# Patient Record
Sex: FEMALE | Race: WHITE | NOT HISPANIC OR LATINO | Employment: FULL TIME | URBAN - METROPOLITAN AREA
[De-identification: names, ages, dates, MRNs, and addresses within clinical notes are randomized per-mention and may not be internally consistent; named-entity substitution may affect disease eponyms.]

---

## 2017-02-15 ENCOUNTER — TELEPHONE (OUTPATIENT)
Dept: FAMILY MEDICINE | Facility: CLINIC | Age: 37
End: 2017-02-15

## 2017-02-15 NOTE — TELEPHONE ENCOUNTER
Spoke with pt, she has had increasing sinus/ear pressure/pain over the last week. Her nasal drainage is green to brown, has had lots of dripping down the throat, dry non productive cough, denies temp. Denies sore throat    Home care instruction given, try saline nasal drops, increase fluids. Use humidity    appt was made for pt to be seen    Pt had no other questions/concerns    Maia Suarez RN

## 2017-02-15 NOTE — TELEPHONE ENCOUNTER
Reason for call:  Patient reporting a symptom    Symptom or request: Lindsay called to say that the last week, she has had sinus pressure, with ear pain and headaches. She is wondering when she should be coming in for this or is it a viral.    Duration (how long have symptoms been present): for the last week    Have you been treated for this before?     Additional comments:     Phone Number patient can be reached at:  [phone]    Best Time:  anytime    Can we leave a detailed message on this number:      Call taken on 2/15/2017 at 10:54 AM by Deepika Kline

## 2017-02-21 ENCOUNTER — OFFICE VISIT (OUTPATIENT)
Dept: FAMILY MEDICINE | Facility: CLINIC | Age: 37
End: 2017-02-21
Payer: COMMERCIAL

## 2017-02-21 VITALS
DIASTOLIC BLOOD PRESSURE: 70 MMHG | BODY MASS INDEX: 26.2 KG/M2 | OXYGEN SATURATION: 98 % | TEMPERATURE: 97.4 F | HEIGHT: 66 IN | HEART RATE: 109 BPM | SYSTOLIC BLOOD PRESSURE: 98 MMHG | WEIGHT: 163 LBS

## 2017-02-21 DIAGNOSIS — J01.90 ACUTE SINUSITIS WITH SYMPTOMS > 10 DAYS: Primary | ICD-10-CM

## 2017-02-21 DIAGNOSIS — B07.8 COMMON WART: ICD-10-CM

## 2017-02-21 DIAGNOSIS — B37.31 YEAST INFECTION OF THE VAGINA: ICD-10-CM

## 2017-02-21 DIAGNOSIS — R06.02 SOB (SHORTNESS OF BREATH): ICD-10-CM

## 2017-02-21 PROCEDURE — 17110 DESTRUCTION B9 LES UP TO 14: CPT | Performed by: PHYSICIAN ASSISTANT

## 2017-02-21 PROCEDURE — 94640 AIRWAY INHALATION TREATMENT: CPT | Performed by: PHYSICIAN ASSISTANT

## 2017-02-21 PROCEDURE — 99214 OFFICE O/P EST MOD 30 MIN: CPT | Mod: 25 | Performed by: PHYSICIAN ASSISTANT

## 2017-02-21 RX ORDER — PREDNISONE 20 MG/1
TABLET ORAL
Qty: 20 TABLET | Refills: 0 | Status: SHIPPED | OUTPATIENT
Start: 2017-02-21 | End: 2017-05-12

## 2017-02-21 RX ORDER — ALBUTEROL SULFATE 90 UG/1
2 AEROSOL, METERED RESPIRATORY (INHALATION) EVERY 6 HOURS PRN
Qty: 1 INHALER | Refills: 0 | Status: SHIPPED | OUTPATIENT
Start: 2017-02-21 | End: 2018-07-26

## 2017-02-21 RX ORDER — FLUCONAZOLE 150 MG/1
150 TABLET ORAL ONCE
Qty: 1 TABLET | Refills: 0 | Status: SHIPPED | OUTPATIENT
Start: 2017-02-21 | End: 2017-03-10

## 2017-02-21 NOTE — MR AVS SNAPSHOT
After Visit Summary   2/21/2017    Lindsay Payne    MRN: [id number]           Patient Information     Date Of Birth          1980        Visit Information        Provider Department      2/21/2017 8:40 AM Kimmy Baltazar PA-C [facility]        Today's Diagnoses     Acute sinusitis with symptoms > 10 days    -  1    SOB (shortness of breath)        Yeast infection of the vagina          Care Instructions    Stop nose spray  Start mediations as directed  Use inhaler as needed  Return to clinic for any new or worsening symptoms or go to ER Urgent care in off hours      Acute Bacterial Rhinosinusitis (ABRS)  Acute bacterial rhinosinusitis (ABRS) is an infection of your nasal cavity and sinuses. It s caused by bacteria. Acute means that you ve had symptoms for less than 12 weeks.  Understanding your sinuses  The nasal cavity is the large air-filled space behind your nose. The sinuses are a group of spaces formed by the bones of your face. They connect with your nasal cavity. ABRS causes the tissue lining these spaces to become inflamed. Mucus may not drain normally. This leads to facial pain and other symptoms.  What causes ABRS?  ABRS most often follows an upper respiratory infection caused by a virus. Bacteria then infect the lining of your nasal cavity and sinuses. But you can also get ABRS if you have:    Nasal allergies    Long-term nasal swelling and congestion not caused by allergies    Blockage in the nose  Symptoms of ABRS  The symptoms of ABRS may be different for each person, and can include:    Nasal congestion    Runny nose    Fluid draining from the nose down the throat (postnasal drip)    Headache    Cough    Pain in the sinuses    Thick, colored fluid from the nose (mucus)    Fever  Diagnosing ABRS  ABRS may be diagnosed if you ve had an upper respiratory infection like a cold and cough for longer than 10 to 14 days. Your health care provider will ask  about your symptoms and your medical history. The provider will check your vital signs, including your temperature. You ll have a physical exam. The health care provider will check your ears, nose, and throat. You likely won t need any tests. If ABRS comes back, you may have a culture or other tests.  Treatment for ABRS  Treatment may include:    Antibiotic medicine. This is for symptoms that last for at least 10 to 14 days.    Nasal corticosteroid medicine. Drops or spray used in the nose can lessen swelling and congestion.    Over-the-counter pain medicine. This is to lessen sinus pain and pressure.    Nasal decongestant medicine. Spray or drops may help to lessen congestion. Do not use them for more than a few days.    Salt wash (saline irrigation). This can help to loosen mucus.  Possible complications of ABRS  ABRS may come back or become long-term (chronic).  In rare cases, ABRS may cause complications such as:     Inflamed tissue around the brain and spinal cord (meningitis)    Inflamed tissue around the eyes (orbital cellulitis)    Inflamed bones around the sinuses (osteitis)  These problems may need to be treated in a hospital with intravenous (IV) antibiotic medicine or surgery.  When to call the health care provider  Call your health care provider if you have any of the following:    Symptoms that don t get better, or get worse    Symptoms that don t get better after 3 to 5 days on antibiotics    Trouble seeing    Swelling around your eyes    Confusion or trouble staying awake        4394-4342 [employer]. [address] [place] [address], [place] [address]. All rights reserved. This information is not intended as a substitute for professional medical care. Always follow your healthcare professional's instructions.              Follow-ups after your visit        Who to contact     If you have questions or need follow up information about today's clinic visit or your schedule please contact [facility] directly at [phone].  Normal or non-critical lab and imaging results will be communicated to you by [employer], letter or phone within 4 business days after the clinic has received the results. If you do not hear from us within 7 days, please contact the clinic through [facility] or phone. If you have a critical or abnormal lab result, we will notify you by phone as soon as possible.  Submit refill requests through [employer] or call your pharmacy and they will forward the refill request to us. Please allow 3 business days for your refill to be completed.          Additional Information About Your Visit        Mango Information     [employer] gives you secure access to your electronic health record. If you see a primary care provider, you can also send messages to your care team and make appointments. If you have questions, please call your primary care clinic.  If you do not have a primary care provider, please call [phone] and they will assist you.        Care EveryWhere ID     This is your Care EveryWhere ID. This could be used by other organizations to access your [facility] medical records  [id number]        Your Vitals Were     Pulse Temperature Height Pulse Oximetry BMI (Body Mass Index)       109 97.4  F (36.3  C) (Oral) 5' 6\" (1.676 m) 98% 26.31 kg/m2        Blood Pressure from Last 3 Encounters:   02/21/17 98/70   05/03/16 100/75   03/18/16 120/70    Weight from Last 3 Encounters:   02/21/17 163 lb (73.9 kg)   05/03/16 168 lb (76.2 kg)   03/18/16 171 lb (77.6 kg)              We Performed the Following     ALBUTEROL UNIT DOSE, 1 MG     INHALATION/NEBULIZER TREATMENT, INITIAL          Today's Medication Changes          These changes are accurate as of: 2/21/17  9:19 AM.  If you have any questions, ask your nurse or doctor.               Start taking these medicines.        Dose/Directions    albuterol 108 (90 BASE) MCG/ACT Inhaler   Commonly known as:  PROAIR HFA/PROVENTIL HFA/VENTOLIN " HFA   Used for:  SOB (shortness of breath)   Started by:  Kimmy Baltazar PA-C        Dose:  2 puff   Inhale 2 puffs into the lungs every 6 hours as needed for shortness of breath / dyspnea or wheezing   Quantity:  1 Inhaler   Refills:  0       amoxicillin-clavulanate 875-125 MG per tablet   Commonly known as:  AUGMENTIN   Used for:  Acute sinusitis with symptoms > 10 days   Started by:  Kimmy Baltazar PA-C        Dose:  1 tablet   Take 1 tablet by mouth 2 times daily for 10 days   Quantity:  20 tablet   Refills:  0       fluconazole 150 MG tablet   Commonly known as:  DIFLUCAN   Used for:  Yeast infection of the vagina   Started by:  Kimmy Baltazar PA-C        Dose:  150 mg   Take 1 tablet (150 mg) by mouth once for 1 dose   Quantity:  1 tablet   Refills:  0       predniSONE 20 MG tablet   Commonly known as:  DELTASONE   Used for:  Acute sinusitis with symptoms > 10 days   Started by:  Kimmy Baltazar PA-C        Take 3 tabs (60 mg) by mouth daily x 3 days, 2 tabs (40 mg) daily x 3 days, 1 tab (20 mg) daily x 3 days, then 1/2 tab (10 mg) x 3 days.   Quantity:  20 tablet   Refills:  0            Where to get your medicines      These medications were sent to [facility] [address]  [address], [facility] [address]     Phone:  [phone]     albuterol 108 (90 BASE) MCG/ACT Inhaler    amoxicillin-clavulanate 875-125 MG per tablet    fluconazole 150 MG tablet    predniSONE 20 MG tablet                Primary Care Provider Office Phone # Fax #    González Ko [phone]       [facility] [address]  [facility] [address]        Thank you!     Thank you for choosing [facility]  for your care. Our goal is always to provide you with excellent care. Hearing back from our patients is one way we can continue to improve our services. Please take a few  minutes to complete the written survey that you may receive in the mail after your visit with us. Thank you!             Your Updated Medication List - Protect others around you: Learn how to safely use, store and throw away your medicines at www.disposemymeds.org.          This list is accurate as of: 2/21/17  9:19 AM.  Always use your most recent med list.                   Brand Name Dispense Instructions for use    albuterol 108 (90 BASE) MCG/ACT Inhaler    PROAIR HFA/PROVENTIL HFA/VENTOLIN HFA    1 Inhaler    Inhale 2 puffs into the lungs every 6 hours as needed for shortness of breath / dyspnea or wheezing       amoxicillin-clavulanate 875-125 MG per tablet    AUGMENTIN    20 tablet    Take 1 tablet by mouth 2 times daily for 10 days       calcium carbonate 500 MG chewable tablet    TUMS     Take 1 chew tab by mouth daily       FENUGREEK PO      Reported on 2/21/2017       fluconazole 150 MG tablet    DIFLUCAN    1 tablet    Take 1 tablet (150 mg) by mouth once for 1 dose       MIRALAX PO      Reported on 2/21/2017       norethindrone 0.35 MG per tablet    MICRONOR    112 tablet    Take 1 tablet (0.35 mg) by mouth daily       penicillin V potassium 500 MG tablet    VEETID    20 tablet    Take 1 tablet (500 mg) by mouth 2 times daily       predniSONE 20 MG tablet    DELTASONE    20 tablet    Take 3 tabs (60 mg) by mouth daily x 3 days, 2 tabs (40 mg) daily x 3 days, 1 tab (20 mg) daily x 3 days, then 1/2 tab (10 mg) x 3 days.       senna-docusate 8.6-50 MG per tablet    SENOKOT-S;PERICOLACE    60 tablet    Take 1-2 tablets by mouth daily as needed for constipation       UNABLE TO FIND      Reported on 2/21/2017

## 2017-02-21 NOTE — PATIENT INSTRUCTIONS
Stop nose spray  Start mediations as directed  Use inhaler as needed  Return to clinic for any new or worsening symptoms or go to ER Urgent care in off hours      Acute Bacterial Rhinosinusitis (ABRS)  Acute bacterial rhinosinusitis (ABRS) is an infection of your nasal cavity and sinuses. It s caused by bacteria. Acute means that you ve had symptoms for less than 12 weeks.  Understanding your sinuses  The nasal cavity is the large air-filled space behind your nose. The sinuses are a group of spaces formed by the bones of your face. They connect with your nasal cavity. ABRS causes the tissue lining these spaces to become inflamed. Mucus may not drain normally. This leads to facial pain and other symptoms.  What causes ABRS?  ABRS most often follows an upper respiratory infection caused by a virus. Bacteria then infect the lining of your nasal cavity and sinuses. But you can also get ABRS if you have:    Nasal allergies    Long-term nasal swelling and congestion not caused by allergies    Blockage in the nose  Symptoms of ABRS  The symptoms of ABRS may be different for each person, and can include:    Nasal congestion    Runny nose    Fluid draining from the nose down the throat (postnasal drip)    Headache    Cough    Pain in the sinuses    Thick, colored fluid from the nose (mucus)    Fever  Diagnosing ABRS  ABRS may be diagnosed if you ve had an upper respiratory infection like a cold and cough for longer than 10 to 14 days. Your health care provider will ask about your symptoms and your medical history. The provider will check your vital signs, including your temperature. You ll have a physical exam. The health care provider will check your ears, nose, and throat. You likely won t need any tests. If ABRS comes back, you may have a culture or other tests.  Treatment for ABRS  Treatment may include:    Antibiotic medicine. This is for symptoms that last for at least 10 to 14 days.    Nasal corticosteroid medicine.  Drops or spray used in the nose can lessen swelling and congestion.    Over-the-counter pain medicine. This is to lessen sinus pain and pressure.    Nasal decongestant medicine. Spray or drops may help to lessen congestion. Do not use them for more than a few days.    Salt wash (saline irrigation). This can help to loosen mucus.  Possible complications of ABRS  ABRS may come back or become long-term (chronic).  In rare cases, ABRS may cause complications such as:     Inflamed tissue around the brain and spinal cord (meningitis)    Inflamed tissue around the eyes (orbital cellulitis)    Inflamed bones around the sinuses (osteitis)  These problems may need to be treated in a hospital with intravenous (IV) antibiotic medicine or surgery.  When to call the health care provider  Call your health care provider if you have any of the following:    Symptoms that don t get better, or get worse    Symptoms that don t get better after 3 to 5 days on antibiotics    Trouble seeing    Swelling around your eyes    Confusion or trouble staying awake        9886-6060 [employer]. [address] [place] [address], [place] [address]. All rights reserved. This information is not intended as a substitute for professional medical care. Always follow your healthcare professional's instructions.

## 2017-02-21 NOTE — NURSING NOTE
"Chief Complaint   Patient presents with     URI       Initial BP 98/70  Pulse 109  Temp 97.4  F (36.3  C) (Oral)  Ht 5' 6\" (1.676 m)  Wt 163 lb (73.9 kg)  SpO2 98%  BMI 26.31 kg/m2 Estimated body mass index is 26.31 kg/(m^2) as calculated from the following:    Height as of this encounter: 5' 6\" (1.676 m).    Weight as of this encounter: 163 lb (73.9 kg).  Medication Reconciliation: complete   Leia Grimes MA      "

## 2017-02-21 NOTE — PROGRESS NOTES
SUBJECTIVE:                                                    Lindsay Payne is a 36 year old female who presents to clinic today for the following health issues:    Acute Illness   Acute illness concerns: Sinus infection  Onset: 1.5 weeks ago    Fever: no    Chills/Sweats: YES- chills    Headache (location?): YES    Sinus Pressure:YES    Conjunctivitis:  no    Ear Pain: YES- on and off    Rhinorrhea: YES    Congestion: YES    Sore Throat: no      Cough: YES    Wheeze: no     Decreased Appetite: no    Nausea: no    Vomiting: no    Diarrhea:  no    Dysuria/Freq.: no    Fatigue/Achiness: YES- both    Sick/Strep Exposure: no      Therapies Tried and outcome: Sudafed, advil, nasal sprays- all with no relief    Also report having a hard time taking a deep breath. This happens when she gets sick. Denies wheezing  Cough is productive    Lately has a wart on her left thumb. Present for 2 months. Has tried over the counter freezing. Didn't work    Problem list and histories reviewed & adjusted, as indicated.  Additional history: as documented    ROS:    CV: NEGATIVE for chest pain, palpitations or peripheral edema  GI: NEGATIVE for nausea, abdominal pain, heartburn, or change in bowel habits    Patient Active Problem List   Diagnosis     CARDIOVASCULAR SCREENING; LDL GOAL LESS THAN 160     Need for Tdap vaccination     Paraguard intrauterine device     Past Surgical History   Procedure Laterality Date     Appendectomy  1994       Social History   Substance Use Topics     Smoking status: Never Smoker     Smokeless tobacco: Never Used     Alcohol use No     Family History   Problem Relation Age of Onset     CANCER Mother      malignant melanoma     Arthritis Father      Bekhterev's disease-spinal suffication           Problem list, Medication list, Allergies, and Medical/Social/Surgical histories reviewed in [facility] and updated as appropriate.    OBJECTIVE:                                                    BP 98/70  Pulse  "109  Temp 97.4  F (36.3  C) (Oral)  Ht 5' 6\" (1.676 m)  Wt 163 lb (73.9 kg)  SpO2 98%  BMI 26.31 kg/m2 Body mass index is 26.31 kg/(m^2).   GENERAL APPEARANCE:alert and no distress, appears ill  EYES: Eyes grossly normal to inspection, PERRL and conjunctivae and sclerae normal  HENT: Ear canals and TM's normal, nose and mouth without ulcers or lesion, nose with purulent discharge, Moderate edematous mucus membranes. Mild sinus tenderness bilateral maxillary . Oropharynx without exudates or edema. With mild erythema. oral mucous membranes moist  NECK: normal, supple and small, benign anterior cervical nodes bilaterally  RESP: lungs clear to auscultation - no rales, rhonchi or wheezes  CV: regular rates and rhythm, normal S1 S2, no S3 or S4 and no murmur, click or rub  SKIN: common wart of left thumb  MS: no edema. Full ROM       ASSESSMENT/PLAN:                                                        ICD-10-CM    1. Acute sinusitis with symptoms > 10 days J01.90 amoxicillin-clavulanate (AUGMENTIN) 875-125 MG per tablet     predniSONE (DELTASONE) 20 MG tablet   2. SOB (shortness of breath) R06.02 INHALATION/NEBULIZER TREATMENT, INITIAL     ALBUTEROL UNIT DOSE, 1 MG     albuterol (PROAIR HFA/PROVENTIL HFA/VENTOLIN HFA) 108 (90 BASE) MCG/ACT Inhaler   3. Yeast infection of the vagina B37.3 fluconazole (DIFLUCAN) 150 MG tablet   4. Common wart B07.8 DESTRUCT BENIGN LESION, UP TO 14     Albuterol nebulizer helped symptoms. She'll use inhaler at home as needed.     After discussing the risks, benefits and alternatives to cryotherapy the patient elected to proceed with this procedure.  The warts were pared with a flexible blade to reveal thrombosed capillaries.  Each individual wart was then frozen with liquid nitrogen x 3.  The patient tolerated this procedure well and there were no immediate adverse effects.  Aftercare was reviewed with the patient in detail. Return at 2 week intervals until resolved    Patient " Instructions     Stop nose spray  Start mediations as directed  Use inhaler as needed  Return to clinic for any new or worsening symptoms or go to ER Urgent care in off hours      Acute Bacterial Rhinosinusitis (ABRS)  Acute bacterial rhinosinusitis (ABRS) is an infection of your nasal cavity and sinuses. It s caused by bacteria. Acute means that you ve had symptoms for less than 12 weeks.  Understanding your sinuses  The nasal cavity is the large air-filled space behind your nose. The sinuses are a group of spaces formed by the bones of your face. They connect with your nasal cavity. ABRS causes the tissue lining these spaces to become inflamed. Mucus may not drain normally. This leads to facial pain and other symptoms.  What causes ABRS?  ABRS most often follows an upper respiratory infection caused by a virus. Bacteria then infect the lining of your nasal cavity and sinuses. But you can also get ABRS if you have:    Nasal allergies    Long-term nasal swelling and congestion not caused by allergies    Blockage in the nose  Symptoms of ABRS  The symptoms of ABRS may be different for each person, and can include:    Nasal congestion    Runny nose    Fluid draining from the nose down the throat (postnasal drip)    Headache    Cough    Pain in the sinuses    Thick, colored fluid from the nose (mucus)    Fever  Diagnosing ABRS  ABRS may be diagnosed if you ve had an upper respiratory infection like a cold and cough for longer than 10 to 14 days. Your health care provider will ask about your symptoms and your medical history. The provider will check your vital signs, including your temperature. You ll have a physical exam. The health care provider will check your ears, nose, and throat. You likely won t need any tests. If ABRS comes back, you may have a culture or other tests.  Treatment for ABRS  Treatment may include:    Antibiotic medicine. This is for symptoms that last for at least 10 to 14 days.    Nasal  "corticosteroid medicine. Drops or spray used in the nose can lessen swelling and congestion.    Over-the-counter pain medicine. This is to lessen sinus pain and pressure.    Nasal decongestant medicine. Spray or drops may help to lessen congestion. Do not use them for more than a few days.    Salt wash (saline irrigation). This can help to loosen mucus.  Possible complications of ABRS  ABRS may come back or become long-term (chronic).  In rare cases, ABRS may cause complications such as:     Inflamed tissue around the brain and spinal cord (meningitis)    Inflamed tissue around the eyes (orbital cellulitis)    Inflamed bones around the sinuses (osteitis)  These problems may need to be treated in a hospital with intravenous (IV) antibiotic medicine or surgery.  When to call the health care provider  Call your health care provider if you have any of the following:    Symptoms that don t get better, or get worse    Symptoms that don t get better after 3 to 5 days on antibiotics    Trouble seeing    Swelling around your eyes    Confusion or trouble staying awake        4036-4413 [employer]. [address] [place] [address]. All rights reserved. This information is not intended as a substitute for professional medical care. Always follow your healthcare professional's instructions.              Estimated body mass index is 26.31 kg/(m^2) as calculated from the following:    Height as of this encounter: 5' 6\" (1.676 m).    Weight as of this encounter: 163 lb (73.9 kg).   Weight management plan: See PCP    Kimmy Grimaldo [facility]  [facility]      The following nebulizer treatment was given:     MEDICATION: Albuterol Sulfate 2.5 mg  : [employer]  LOT #: [id number]  EXPIRATION DATE:  05/18  NDC # 2031-8352-52     "

## 2017-03-01 NOTE — PROGRESS NOTES
"  SUBJECTIVE:                                                    Lindsay Payne is a 36 year old female who presents to clinic today for the following health issues:    WART(S)     Onset: Months    Description:   Location: left thumb  Number of warts: 1  Painful: no    Accompanying Signs & Symptoms:  Signs of infection: no   History:   History of trauma: no  Prior warts: no         Therapies Tried and outcome: Freezing them at home        Problem list and histories reviewed & adjusted, as indicated.  Additional history: as documented    ROS:  C: NEGATIVE for fever, chills, change in weight  E/M: NEGATIVE for ear, mouth and throat problems  R: NEGATIVE for significant cough or SOB  CV: NEGATIVE for chest pain, palpitations or peripheral edema    Patient Active Problem List   Diagnosis     CARDIOVASCULAR SCREENING; LDL GOAL LESS THAN 160     Need for Tdap vaccination     Paraguard intrauterine device     Past Surgical History   Procedure Laterality Date     Appendectomy  1994       Social History   Substance Use Topics     Smoking status: Never Smoker     Smokeless tobacco: Never Used     Alcohol use No     Family History   Problem Relation Age of Onset     CANCER Mother      malignant melanoma     Arthritis Father      Bekhterev's disease-spinal suffication           Problem list, Medication list, Allergies, and Medical/Social/Surgical histories reviewed in [facility] and updated as appropriate.    OBJECTIVE:                                                    /71  Pulse 91  Temp 97.9  F (36.6  C) (Oral)  Ht 5' 6\" (1.676 m)  Wt 158 lb 14.4 oz (72.1 kg)  SpO2 99%  BMI 25.65 kg/m2 Body mass index is 25.65 kg/(m^2).   GENERAL: Alert and oriented x 3. No acute distress. WD WN   HEAD: Normocephalic.Atramautic   EYES: PERRLA. EOMs are full.   NOSE: patent   EXTREMITIES: Warm, well perfused with good ROM   SKIN: Warm and dry. Typical wart of left thumb   NEUROLOGIC: CN 2-12 grossly intact       ASSESSMENT/PLAN:              " "                                          ICD-10-CM    1. Common wart B07.8 DESTRUCT BENIGN LESION, UP TO 14       After discussing the risks, benefits and alternatives to cryotherapy the patient elected to proceed with this procedure. The warts were pared with a flexible blade to reveal thrombosed capillaries. Each individual wart was then frozen with liquid nitrogen x 3. The patient tolerated this procedure well and there were no immediate adverse effects. Aftercare was reviewed with the patient in detail. Return at 2 week intervals until resolved        Estimated body mass index is 25.65 kg/(m^2) as calculated from the following:    Height as of this encounter: 5' 6\" (1.676 m).    Weight as of this encounter: 158 lb 14.4 oz (72.1 kg).       Kimmy Grimaldo [facility] [place]  [facility]      "

## 2017-03-02 ENCOUNTER — MYC MEDICAL ADVICE (OUTPATIENT)
Dept: FAMILY MEDICINE | Facility: CLINIC | Age: 37
End: 2017-03-02

## 2017-03-02 NOTE — TELEPHONE ENCOUNTER
Most sinusitis is viral and does not improve with abx, if symptoms worse we could se her tomorrow for recheck other keep doing what maximilian greer is doing and recheck Monday

## 2017-03-02 NOTE — TELEPHONE ENCOUNTER
Please see [employer] message below and advise.     Routing to PCP. [place] is out of office until 3/6. Thanks.    Bina Morgan RN  [facility]

## 2017-03-03 NOTE — TELEPHONE ENCOUNTER
[employer] message returned to patient. See [email or web address] communication dated 3/2/17. Closing encounter.     Janet Arndt RN  [facility]

## 2017-03-06 ENCOUNTER — OFFICE VISIT (OUTPATIENT)
Dept: FAMILY MEDICINE | Facility: CLINIC | Age: 37
End: 2017-03-06
Payer: COMMERCIAL

## 2017-03-06 VITALS
TEMPERATURE: 97.9 F | SYSTOLIC BLOOD PRESSURE: 104 MMHG | HEART RATE: 91 BPM | DIASTOLIC BLOOD PRESSURE: 71 MMHG | OXYGEN SATURATION: 99 % | BODY MASS INDEX: 25.54 KG/M2 | WEIGHT: 158.9 LBS | HEIGHT: 66 IN

## 2017-03-06 DIAGNOSIS — B07.8 COMMON WART: Primary | ICD-10-CM

## 2017-03-06 PROCEDURE — 99207 ZZC CDG-PROCEDURE CHARGE ONLY: CPT | Performed by: PHYSICIAN ASSISTANT

## 2017-03-06 PROCEDURE — 17110 DESTRUCTION B9 LES UP TO 14: CPT | Performed by: PHYSICIAN ASSISTANT

## 2017-03-06 NOTE — MR AVS SNAPSHOT
"              After Visit Summary   3/6/2017    Lindsay Payne    MRN: [id number]           Patient Information     Date Of Birth          1980        Visit Information        Provider Department      3/6/2017 3:20 PM Kimmy Baltazar PA-C [facility]        Today's Diagnoses     Common wart    -  1       Follow-ups after your visit        Who to contact     If you have questions or need follow up information about today's clinic visit or your schedule please contact [facility] directly at [phone].  Normal or non-critical lab and imaging results will be communicated to you by [employer], letter or phone within 4 business days after the clinic has received the results. If you do not hear from us within 7 days, please contact the clinic through Pleit or phone. If you have a critical or abnormal lab result, we will notify you by phone as soon as possible.  Submit refill requests through Nolio or call your pharmacy and they will forward the refill request to us. Please allow 3 business days for your refill to be completed.          Additional Information About Your Visit        MyChart Information     Nolio gives you secure access to your electronic health record. If you see a primary care provider, you can also send messages to your care team and make appointments. If you have questions, please call your primary care clinic.  If you do not have a primary care provider, please call [phone] and they will assist you.        Care EveryWhere ID     This is your Care EveryWhere ID. This could be used by other organizations to access your [place] medical records  [id number]        Your Vitals Were     Pulse Temperature Height Pulse Oximetry BMI (Body Mass Index)       91 97.9  F (36.6  C) (Oral) 5' 6\" (1.676 m) 99% 25.65 kg/m2        Blood Pressure from Last 3 Encounters:   03/06/17 104/71   02/21/17 98/70   05/03/16 100/75    Weight from Last 3 Encounters: "   03/06/17 158 lb 14.4 oz (72.1 kg)   02/21/17 163 lb (73.9 kg)   05/03/16 168 lb (76.2 kg)              We Performed the Following     DESTRUCT BENIGN LESION, UP TO 14        Primary Care Provider Office Phone # Fax #    González Ko [phone]       [facility] [address] [facility] [address]  [facility] [address]        Thank you!     Thank you for choosing [facility]  for your care. Our goal is always to provide you with excellent care. Hearing back from our patients is one way we can continue to improve our services. Please take a few minutes to complete the written survey that you may receive in the mail after your visit with us. Thank you!             Your Updated Medication List - Protect others around you: Learn how to safely use, store and throw away your medicines at www.disposemymeds.org.          This list is accurate as of: 3/6/17  3:56 PM.  Always use your most recent med list.                   Brand Name Dispense Instructions for use    albuterol 108 (90 BASE) MCG/ACT Inhaler    PROAIR HFA/PROVENTIL HFA/VENTOLIN HFA    1 Inhaler    Inhale 2 puffs into the lungs every 6 hours as needed for shortness of breath / dyspnea or wheezing       calcium carbonate 500 MG chewable tablet    TUMS     Take 1 chew tab by mouth daily       FENUGREEK PO      Reported on 2/21/2017       MIRALAX PO      Reported on 2/21/2017       norethindrone 0.35 MG per tablet    MICRONOR    112 tablet    Take 1 tablet (0.35 mg) by mouth daily       penicillin V potassium 500 MG tablet    VEETID    20 tablet    Take 1 tablet (500 mg) by mouth 2 times daily       predniSONE 20 MG tablet    DELTASONE    20 tablet    Take 3 tabs (60 mg) by mouth daily x 3 days, 2 tabs (40 mg) daily x 3 days, 1 tab (20 mg) daily x 3 days, then 1/2 tab (10 mg) x 3 days.       senna-docusate 8.6-50 MG per tablet    SENOKOT-S;PERICOLACE    60 tablet    Take 1-2 tablets by mouth daily as needed for constipation        UNABLE TO FIND      Reported on 2/21/2017

## 2017-03-06 NOTE — NURSING NOTE
"Chief Complaint   Patient presents with     Derm Problem       Initial /71  Pulse 91  Temp 97.9  F (36.6  C) (Oral)  Ht 5' 6\" (1.676 m)  Wt 158 lb 14.4 oz (72.1 kg)  SpO2 99%  BMI 25.65 kg/m2 Estimated body mass index is 25.65 kg/(m^2) as calculated from the following:    Height as of this encounter: 5' 6\" (1.676 m).    Weight as of this encounter: 158 lb 14.4 oz (72.1 kg).  Medication Reconciliation: complete     Leia Grimes MA      "

## 2017-03-09 ENCOUNTER — MYC MEDICAL ADVICE (OUTPATIENT)
Dept: FAMILY MEDICINE | Facility: CLINIC | Age: 37
End: 2017-03-09

## 2017-03-09 DIAGNOSIS — B37.31 YEAST INFECTION OF THE VAGINA: ICD-10-CM

## 2017-03-09 NOTE — TELEPHONE ENCOUNTER
[employer] message sent to pt. With additional questions. Await response.    Bina Morgan RN  [facility]

## 2017-03-10 RX ORDER — FLUCONAZOLE 150 MG/1
150 TABLET ORAL ONCE
Qty: 1 TABLET | Refills: 0 | Status: SHIPPED | OUTPATIENT
Start: 2017-03-10 | End: 2017-03-10

## 2017-03-14 NOTE — PROGRESS NOTES
"  SUBJECTIVE:                                                    Lindsay Payne is a 36 year old female who presents to clinic today for the following health issues:    WART(S)     Onset: Months    Description:   Location: Left thumb  Number of warts: 1  Painful: no    Accompanying Signs & Symptoms:  Signs of infection: no   History:   History of trauma: no  Prior warts: no         Therapies Tried and outcome: Freezing it last time made it a little better, it is still there.         Problem list and histories reviewed & adjusted, as indicated.  Additional history: as documented    ROS:  C: NEGATIVE for fever, chills, change in weight  E/M: NEGATIVE for ear, mouth and throat problems  R: NEGATIVE for significant cough or SOB  CV: NEGATIVE for chest pain, palpitations or peripheral edema    Patient Active Problem List   Diagnosis     CARDIOVASCULAR SCREENING; LDL GOAL LESS THAN 160     Need for Tdap vaccination     Paraguard intrauterine device     Past Surgical History   Procedure Laterality Date     Appendectomy  1994       Social History   Substance Use Topics     Smoking status: Never Smoker     Smokeless tobacco: Never Used     Alcohol use No     Family History   Problem Relation Age of Onset     CANCER Mother      malignant melanoma     Arthritis Father      Bekhterev's disease-spinal suffication           Problem list, Medication list, Allergies, and Medical/Social/Surgical histories reviewed in [facility] and updated as appropriate.    OBJECTIVE:                                                    /71  Pulse 91  Temp 97.9  F (36.6  C) (Oral)  Ht 5' 6\" (1.676 m)  Wt 158 lb 14.4 oz (72.1 kg)  SpO2 99%  BMI 25.65 kg/m2 Body mass index is 25.65 kg/(m^2).   GENERAL: Alert and oriented x 3. No acute distress. WD WN   HEAD: Normocephalic.Atramautic   EYES: PERRLA. EOMs are full.   NOSE: patent   EXTREMITIES: Warm, well perfused with good ROM   SKIN: Warm and dry. Typical wart of left thumb, improved  NEUROLOGIC: CN " "2-12 grossly intact       ASSESSMENT/PLAN:                                                        ICD-10-CM    1. Common wart B07.8 DESTRUCT BENIGN LESION, UP TO 14       After discussing the risks, benefits and alternatives to cryotherapy the patient elected to proceed with this procedure. The warts were pared with a flexible blade to reveal thrombosed capillaries. Each individual wart was then frozen with liquid nitrogen x 3. The patient tolerated this procedure well and there were no immediate adverse effects. Aftercare was reviewed with the patient in detail. Return at 2 week intervals until resolved        Estimated body mass index is 25.65 kg/(m^2) as calculated from the following:    Height as of this encounter: 5' 6\" (1.676 m).    Weight as of this encounter: 158 lb 14.4 oz (72.1 kg).       Kimmy Grimaldo [facility]  [facility] [place]      "

## 2017-03-16 ENCOUNTER — OFFICE VISIT (OUTPATIENT)
Dept: FAMILY MEDICINE | Facility: CLINIC | Age: 37
End: 2017-03-16
Payer: COMMERCIAL

## 2017-03-16 VITALS
TEMPERATURE: 99.4 F | HEIGHT: 66 IN | OXYGEN SATURATION: 98 % | HEART RATE: 76 BPM | BODY MASS INDEX: 25.67 KG/M2 | WEIGHT: 159.7 LBS | DIASTOLIC BLOOD PRESSURE: 70 MMHG | SYSTOLIC BLOOD PRESSURE: 102 MMHG

## 2017-03-16 DIAGNOSIS — B07.8 COMMON WART: Primary | ICD-10-CM

## 2017-03-16 PROCEDURE — 99024 POSTOP FOLLOW-UP VISIT: CPT | Performed by: PHYSICIAN ASSISTANT

## 2017-03-16 NOTE — MR AVS SNAPSHOT
"              After Visit Summary   3/16/2017    Lindsay Payne    MRN: [id number]           Patient Information     Date Of Birth          1980        Visit Information        Provider Department      3/16/2017 9:20 AM Kimmy Baltazar PA-C [facility]        Today's Diagnoses     Common wart    -  1       Follow-ups after your visit        Who to contact     If you have questions or need follow up information about today's clinic visit or your schedule please contact [facility] directly at [phone].  Normal or non-critical lab and imaging results will be communicated to you by [employer], letter or phone within 4 business days after the clinic has received the results. If you do not hear from us within 7 days, please contact the clinic through Confidext or phone. If you have a critical or abnormal lab result, we will notify you by phone as soon as possible.  Submit refill requests through Language Systems or call your pharmacy and they will forward the refill request to us. Please allow 3 business days for your refill to be completed.          Additional Information About Your Visit        MyChart Information     Language Systems gives you secure access to your electronic health record. If you see a primary care provider, you can also send messages to your care team and make appointments. If you have questions, please call your primary care clinic.  If you do not have a primary care provider, please call [phone] and they will assist you.        Care EveryWhere ID     This is your Care EveryWhere ID. This could be used by other organizations to access your [place] medical records  [id number]        Your Vitals Were     Pulse Temperature Height Pulse Oximetry BMI (Body Mass Index)       76 99.4  F (37.4  C) (Oral) 5' 6\" (1.676 m) 98% 25.78 kg/m2        Blood Pressure from Last 3 Encounters:   03/16/17 102/70   03/06/17 104/71   02/21/17 98/70    Weight from Last 3 Encounters: "   03/16/17 159 lb 11.2 oz (72.4 kg)   03/06/17 158 lb 14.4 oz (72.1 kg)   02/21/17 163 lb (73.9 kg)              We Performed the Following     DESTRUCT BENIGN LESION, UP TO 14        Primary Care Provider Office Phone # Fax #    González Ko [phone]       [facility] [address] [facility] [address]  [facility] [address]        Thank you!     Thank you for choosing [facility] [place]  for your care. Our goal is always to provide you with excellent care. Hearing back from our patients is one way we can continue to improve our services. Please take a few minutes to complete the written survey that you may receive in the mail after your visit with us. Thank you!             Your Updated Medication List - Protect others around you: Learn how to safely use, store and throw away your medicines at www.disposemymeds.org.          This list is accurate as of: 3/16/17  9:41 AM.  Always use your most recent med list.                   Brand Name Dispense Instructions for use    albuterol 108 (90 BASE) MCG/ACT Inhaler    PROAIR HFA/PROVENTIL HFA/VENTOLIN HFA    1 Inhaler    Inhale 2 puffs into the lungs every 6 hours as needed for shortness of breath / dyspnea or wheezing       calcium carbonate 500 MG chewable tablet    TUMS     Take 1 chew tab by mouth daily       FENUGREEK PO      Reported on 2/21/2017       MIRALAX PO      Reported on 2/21/2017       norethindrone 0.35 MG per tablet    MICRONOR    112 tablet    Take 1 tablet (0.35 mg) by mouth daily       penicillin V potassium 500 MG tablet    VEETID    20 tablet    Take 1 tablet (500 mg) by mouth 2 times daily       predniSONE 20 MG tablet    DELTASONE    20 tablet    Take 3 tabs (60 mg) by mouth daily x 3 days, 2 tabs (40 mg) daily x 3 days, 1 tab (20 mg) daily x 3 days, then 1/2 tab (10 mg) x 3 days.       senna-docusate 8.6-50 MG per tablet    SENOKOT-S;PERICOLACE    60 tablet    Take 1-2 tablets by mouth daily as needed for  constipation       UNABLE TO FIND      Reported on 2/21/2017

## 2017-03-16 NOTE — NURSING NOTE
"Chief Complaint   Patient presents with     Wart       Initial /70  Pulse 76  Temp 99.4  F (37.4  C) (Oral)  Ht 5' 6\" (1.676 m)  Wt 159 lb 11.2 oz (72.4 kg)  SpO2 98%  BMI 25.78 kg/m2 Estimated body mass index is 25.78 kg/(m^2) as calculated from the following:    Height as of this encounter: 5' 6\" (1.676 m).    Weight as of this encounter: 159 lb 11.2 oz (72.4 kg).  Medication Reconciliation: complete     Leia Grimes MA      "

## 2017-04-18 ENCOUNTER — OFFICE VISIT (OUTPATIENT)
Dept: FAMILY MEDICINE | Facility: CLINIC | Age: 37
End: 2017-04-18
Payer: COMMERCIAL

## 2017-04-18 VITALS
SYSTOLIC BLOOD PRESSURE: 98 MMHG | BODY MASS INDEX: 25.91 KG/M2 | OXYGEN SATURATION: 100 % | HEART RATE: 77 BPM | TEMPERATURE: 98.5 F | WEIGHT: 160.5 LBS | DIASTOLIC BLOOD PRESSURE: 64 MMHG

## 2017-04-18 DIAGNOSIS — B07.8 COMMON WART: Primary | ICD-10-CM

## 2017-04-18 PROCEDURE — 99207 ZZC DROP WITH A PROCEDURE: CPT | Performed by: PHYSICIAN ASSISTANT

## 2017-04-18 PROCEDURE — 17110 DESTRUCTION B9 LES UP TO 14: CPT | Performed by: PHYSICIAN ASSISTANT

## 2017-04-18 RX ORDER — IMIQUIMOD 12.5 MG/.25G
CREAM TOPICAL
Qty: 12 PACKET | Refills: 3 | Status: SHIPPED | OUTPATIENT
Start: 2017-04-18 | End: 2018-04-24

## 2017-04-18 NOTE — PROGRESS NOTES
SUBJECTIVE:                                                    Lindsay Payne is a 36 year old female who presents to clinic today for the following health issues:    WART(S)     Onset: 3 months    Description:   Location: Left thumb  Number of warts: 1  Painful: no    Accompanying Signs & Symptoms:  Signs of infection: no   History:   History of trauma: no  Prior warts: no         Therapies Tried and outcome: liquid nitrogen, it is growing         Problem list and histories reviewed & adjusted, as indicated.  Additional history: as documented    ROS:  C: NEGATIVE for fever, chills, change in weight  E/M: NEGATIVE for ear, mouth and throat problems  R: NEGATIVE for significant cough or SOB  CV: NEGATIVE for chest pain, palpitations or peripheral edema    Patient Active Problem List   Diagnosis     CARDIOVASCULAR SCREENING; LDL GOAL LESS THAN 160     Need for Tdap vaccination     Paraguard intrauterine device     Past Surgical History:   Procedure Laterality Date     APPENDECTOMY  1994       Social History   Substance Use Topics     Smoking status: Never Smoker     Smokeless tobacco: Never Used     Alcohol use No     Family History   Problem Relation Age of Onset     CANCER Mother      malignant melanoma     Arthritis Father      Bekhterev's disease-spinal suffication           Problem list, Medication list, Allergies, and Medical/Social/Surgical histories reviewed in [facility] and updated as appropriate.    OBJECTIVE:                                                    BP 98/64  Pulse 77  Temp 98.5  F (36.9  C) (Oral)  Wt 160 lb 8 oz (72.8 kg)  SpO2 100%  BMI 25.91 kg/m2 Body mass index is 25.91 kg/(m^2).   GENERAL: Alert and oriented x 3. No acute distress. WD WN   HEAD: Normocephalic.Atramautic   EYES: PERRLA. EOMs are full.   NOSE: patent   EXTREMITIES: Warm, well perfused with good ROM   SKIN: Warm and dry. Typical wart of left thumb   NEUROLOGIC: CN 2-12 grossly intact         ASSESSMENT/PLAN:                  "                                       ICD-10-CM    1. Common wart B07.8 imiquimod (ALDARA) 5 % cream     DESTRUCT BENIGN LESION, UP TO 14       After informed consent was obtained, cryotherapy was applied to lesion for 3 cycles of 10- 15 seconds each. Patient tolerated without complications. Explained that lesion would blister over and to monitor for signs of infection. Otherwise return at 2 week intervals until resolved.     We'll add aldara since this wart is quite stubborn to get rid of.    Patient Instructions   Use aldara cream as directed  Continue follow up every 2 weeks for liquid nitrogen  Return to clinic for any new or worsening symptoms or go to ER Urgent care in off hours          Estimated body mass index is 25.91 kg/(m^2) as calculated from the following:    Height as of 3/16/17: 5' 6\" (1.676 m).    Weight as of this encounter: 160 lb 8 oz (72.8 kg).       Kimmy Trentkristine  [facility]      "

## 2017-04-18 NOTE — PATIENT INSTRUCTIONS
Use aldara cream as directed  Continue follow up every 2 weeks for liquid nitrogen  Return to clinic for any new or worsening symptoms or go to ER Urgent care in off hours

## 2017-04-18 NOTE — MR AVS SNAPSHOT
After Visit Summary   4/18/2017    Lindsay Payne    MRN: [id number]           Patient Information     Date Of Birth          1980        Visit Information        Provider Department      4/18/2017 9:20 AM Kimmy Baltazar PA-C [facility]        Today's Diagnoses     Common wart    -  1      Care Instructions    Use aldara cream as directed  Continue follow up every 2 weeks for liquid nitrogen  Return to clinic for any new or worsening symptoms or go to ER Urgent care in off hours          Follow-ups after your visit        Who to contact     If you have questions or need follow up information about today's clinic visit or your schedule please contact [facility] directly at [phone].  Normal or non-critical lab and imaging results will be communicated to you by SHERPA assistanthart, letter or phone within 4 business days after the clinic has received the results. If you do not hear from us within 7 days, please contact the clinic through SHERPA assistanthart or phone. If you have a critical or abnormal lab result, we will notify you by phone as soon as possible.  Submit refill requests through CmyCasa or call your pharmacy and they will forward the refill request to us. Please allow 3 business days for your refill to be completed.          Additional Information About Your Visit        MyChart Information     CmyCasa gives you secure access to your electronic health record. If you see a primary care provider, you can also send messages to your care team and make appointments. If you have questions, please call your primary care clinic.  If you do not have a primary care provider, please call [phone] and they will assist you.        Care EveryWhere ID     This is your Care EveryWhere ID. This could be used by other organizations to access your [place] medical records  [id number]        Your Vitals Were     Pulse Temperature Pulse Oximetry BMI (Body Mass Index)           77 98.5  F (36.9  C) (Oral) 100% 25.91 kg/m2         Blood Pressure from Last 3 Encounters:   04/18/17 98/64   03/16/17 102/70   03/06/17 104/71    Weight from Last 3 Encounters:   04/18/17 160 lb 8 oz (72.8 kg)   03/16/17 159 lb 11.2 oz (72.4 kg)   03/06/17 158 lb 14.4 oz (72.1 kg)              Today, you had the following     No orders found for display         Today's Medication Changes          These changes are accurate as of: 4/18/17  9:39 AM.  If you have any questions, ask your nurse or doctor.               Start taking these medicines.        Dose/Directions    imiquimod 5 % cream   Commonly known as:  ALDARA   Used for:  Common wart   Started by:  Kimmy Baltazar PA-C        Apply a small sized amount to warts or molluscum three times weekly at bedtime.   Wash off after 8 hours.   May use for up to 16 weeks.   Quantity:  12 packet   Refills:  3            Where to get your medicines      These medications were sent to [id number] - [place], [place] - [address], [place] [address]     Phone:  [phone]     imiquimod 5 % cream                Primary Care Provider Office Phone # Fax #    González Shorty Ko [phone]       [facility] [address]  [facility] [address]        Thank you!     Thank you for choosing [facility]  for your care. Our goal is always to provide you with excellent care. Hearing back from our patients is one way we can continue to improve our services. Please take a few minutes to complete the written survey that you may receive in the mail after your visit with us. Thank you!             Your Updated Medication List - Protect others around you: Learn how to safely use, store and throw away your medicines at www.disposemymeds.org.          This list is accurate as of: 4/18/17  9:39 AM.  Always use your most recent med list.                    Brand Name Dispense Instructions for use    albuterol 108 (90 BASE) MCG/ACT Inhaler    PROAIR HFA/PROVENTIL HFA/VENTOLIN HFA    1 Inhaler    Inhale 2 puffs into the lungs every 6 hours as needed for shortness of breath / dyspnea or wheezing       calcium carbonate 500 MG chewable tablet    TUMS     Take 1 chew tab by mouth daily       FENUGREEK PO      Reported on 2/21/2017       imiquimod 5 % cream    ALDARA    12 packet    Apply a small sized amount to warts or molluscum three times weekly at bedtime.   Wash off after 8 hours.   May use for up to 16 weeks.       MIRALAX PO      Reported on 2/21/2017       norethindrone 0.35 MG per tablet    MICRONOR    112 tablet    Take 1 tablet (0.35 mg) by mouth daily       penicillin V potassium 500 MG tablet    VEETID    20 tablet    Take 1 tablet (500 mg) by mouth 2 times daily       predniSONE 20 MG tablet    DELTASONE    20 tablet    Take 3 tabs (60 mg) by mouth daily x 3 days, 2 tabs (40 mg) daily x 3 days, 1 tab (20 mg) daily x 3 days, then 1/2 tab (10 mg) x 3 days.       senna-docusate 8.6-50 MG per tablet    SENOKOT-S;PERICOLACE    60 tablet    Take 1-2 tablets by mouth daily as needed for constipation       UNABLE TO FIND      Reported on 2/21/2017

## 2017-04-18 NOTE — NURSING NOTE
"Chief Complaint   Patient presents with     Derm Problem       Initial BP 98/64  Pulse 77  Temp 98.5  F (36.9  C) (Oral)  Wt 160 lb 8 oz (72.8 kg)  SpO2 100%  BMI 25.91 kg/m2 Estimated body mass index is 25.91 kg/(m^2) as calculated from the following:    Height as of 3/16/17: 5' 6\" (1.676 m).    Weight as of this encounter: 160 lb 8 oz (72.8 kg).  Medication Reconciliation: complete     Leia Grimes MA      "

## 2017-05-12 ENCOUNTER — OFFICE VISIT (OUTPATIENT)
Dept: FAMILY MEDICINE | Facility: CLINIC | Age: 37
End: 2017-05-12
Payer: COMMERCIAL

## 2017-05-12 VITALS
HEIGHT: 66 IN | SYSTOLIC BLOOD PRESSURE: 100 MMHG | BODY MASS INDEX: 25.67 KG/M2 | OXYGEN SATURATION: 97 % | WEIGHT: 159.7 LBS | HEART RATE: 89 BPM | DIASTOLIC BLOOD PRESSURE: 72 MMHG | TEMPERATURE: 97 F

## 2017-05-12 DIAGNOSIS — Z23 NEED FOR MMRV (MEASLES-MUMPS-RUBELLA-VARICELLA) VACCINE: ICD-10-CM

## 2017-05-12 DIAGNOSIS — K29.00 OTHER ACUTE GASTRITIS: Primary | ICD-10-CM

## 2017-05-12 LAB
ERYTHROCYTE [DISTWIDTH] IN BLOOD BY AUTOMATED COUNT: 14 % (ref 10–15)
HCT VFR BLD AUTO: 39 % (ref 35–47)
HGB BLD-MCNC: 12.5 G/DL (ref 11.7–15.7)
MCH RBC QN AUTO: 27.5 PG (ref 26.5–33)
MCHC RBC AUTO-ENTMCNC: 32.1 G/DL (ref 31.5–36.5)
MCV RBC AUTO: 86 FL (ref 78–100)
PLATELET # BLD AUTO: 273 10E9/L (ref 150–450)
RBC # BLD AUTO: 4.55 10E12/L (ref 3.8–5.2)
WBC # BLD AUTO: 5.9 10E9/L (ref 4–11)

## 2017-05-12 PROCEDURE — 90716 VAR VACCINE LIVE SUBQ: CPT | Performed by: FAMILY MEDICINE

## 2017-05-12 PROCEDURE — 36415 COLL VENOUS BLD VENIPUNCTURE: CPT | Performed by: FAMILY MEDICINE

## 2017-05-12 PROCEDURE — 90707 MMR VACCINE SC: CPT | Performed by: FAMILY MEDICINE

## 2017-05-12 PROCEDURE — 80053 COMPREHEN METABOLIC PANEL: CPT | Performed by: FAMILY MEDICINE

## 2017-05-12 PROCEDURE — 85027 COMPLETE CBC AUTOMATED: CPT | Performed by: FAMILY MEDICINE

## 2017-05-12 PROCEDURE — 90471 IMMUNIZATION ADMIN: CPT | Performed by: FAMILY MEDICINE

## 2017-05-12 PROCEDURE — 90472 IMMUNIZATION ADMIN EACH ADD: CPT | Performed by: FAMILY MEDICINE

## 2017-05-12 PROCEDURE — 99214 OFFICE O/P EST MOD 30 MIN: CPT | Mod: 25 | Performed by: FAMILY MEDICINE

## 2017-05-12 NOTE — PROGRESS NOTES
"Chief Complaint   Patient presents with     Abdominal Pain       Initial /72  Pulse 89  Temp 97  F (36.1  C) (Oral)  Ht 5' 6\" (1.676 m)  Wt 159 lb 11.2 oz (72.4 kg)  SpO2 97%  BMI 25.78 kg/m2 Estimated body mass index is 25.78 kg/(m^2) as calculated from the following:    Height as of this encounter: 5' 6\" (1.676 m).    Weight as of this encounter: 159 lb 11.2 oz (72.4 kg).  Medication Reconciliation: complete     Tangela Hewitt MA      "

## 2017-05-12 NOTE — PROGRESS NOTES
"  SUBJECTIVE:                                                    Lindsay Payne is a 36 year old female who presents to clinic today for the following health issues:      ABDOMINAL PAIN     Onset: 2-3 weeks    Description:   Character: Dull ache and Gnawing  Location: epigastric region  Radiation: None    Intensity: moderate    Progression of Symptoms:  worsening    Accompanying Signs & Symptoms:  Fever/Chills?: no   Gas/Bloating: no   Nausea: no   Vomitting: no   Diarrhea?: no   Constipation:no   Dysuria or Hematuria: no    History:   Trauma: no   Previous similar pain: no    Previous tests done:     Precipitating factors: none  Does the pain change with:     Food: YES- acids foots and sometimes just water triggers the pain      BM: no     Urination: no     Therapies Tried and outcome: none          has had this before  Cut back on coffee    Problem list and histories reviewed & adjusted, as indicated.  Additional history: as documented    Labs reviewed in EPIC    Reviewed and updated as needed this visit by clinical staff       Reviewed and updated as needed this visit by Provider         ROS:  Constitutional, HEENT, cardiovascular, pulmonary, gi and gu systems are negative, except as otherwise noted.    OBJECTIVE:                                                    /72  Pulse 89  Temp 97  F (36.1  C) (Oral)  Ht 5' 6\" (1.676 m)  Wt 159 lb 11.2 oz (72.4 kg)  SpO2 97%  BMI 25.78 kg/m2  Body mass index is 25.78 kg/(m^2).  GENERAL: healthy, alert and no distress  HENT: ear canals and TM's normal, nose and mouth without ulcers or lesions  NECK: no adenopathy, no asymmetry, masses, or scars and thyroid normal to palpation  RESP: lungs clear to auscultation - no rales, rhonchi or wheezes  CV: regular rate and rhythm, normal S1 S2, no S3 or S4, no murmur, click or rub, no peripheral edema and peripheral pulses strong  ABDOMEN: tenderness LUQ without rebund or quarding no organomegaly or masses and bowel sounds " normal  SKIN: no suspicious lesions or rashes  LYMPH: no cervical, supraclavicular, axillary, or inguinal adenopathy         ASSESSMENT/PLAN:                                                            1. Other acute gastritis  try  - omeprazole (PRILOSEC) 20 MG CR capsule; Take 1 capsule (20 mg) by mouth daily  Dispense: 30 capsule; Refill: 1  - Comprehensive metabolic panel (BMP + Alb, Alk Phos, ALT, AST, Total. Bili, TP)  - CBC with platelets  Follow up in 1 month.  If not resolving or recurs do h pylori stool tests  2. Need for MMRV (measles-mumps-rubella-varicella) vaccine  Need secong varicella and should update MMR  - MMR VIRUS IMMUNIZATION, SUBCUT  - CHICKEN POX VACCINE,LIVE,SUBCUT    See Patient Instructions    González Ko MD  [facility]

## 2017-05-12 NOTE — MR AVS SNAPSHOT
"              After Visit Summary   5/12/2017    Lindsay Payne    MRN: [id number]           Patient Information     Date Of Birth          1980        Visit Information        Provider Department      5/12/2017 10:30 AM González Ko MD [facility]        Today's Diagnoses     Other acute gastritis    -  1    Need for MMRV (measles-mumps-rubella-varicella) vaccine           Follow-ups after your visit        Who to contact     If you have questions or need follow up information about today's clinic visit or your schedule please contact [facility] directly at [phone].  Normal or non-critical lab and imaging results will be communicated to you by MyChart, letter or phone within 4 business days after the clinic has received the results. If you do not hear from us within 7 days, please contact the clinic through SweetPerkhart or phone. If you have a critical or abnormal lab result, we will notify you by phone as soon as possible.  Submit refill requests through Agribots or call your pharmacy and they will forward the refill request to us. Please allow 3 business days for your refill to be completed.          Additional Information About Your Visit        MyChart Information     Agribots gives you secure access to your electronic health record. If you see a primary care provider, you can also send messages to your care team and make appointments. If you have questions, please call your primary care clinic.  If you do not have a primary care provider, please call [phone] and they will assist you.        Care EveryWhere ID     This is your Care EveryWhere ID. This could be used by other organizations to access your [place] medical records  [id number]        Your Vitals Were     Pulse Temperature Height Pulse Oximetry BMI (Body Mass Index)       89 97  F (36.1  C) (Oral) 5' 6\" (1.676 m) 97% 25.78 kg/m2        Blood Pressure from Last 3 Encounters:   05/12/17 100/72 "   04/18/17 98/64   03/16/17 102/70    Weight from Last 3 Encounters:   05/12/17 159 lb 11.2 oz (72.4 kg)   04/18/17 160 lb 8 oz (72.8 kg)   03/16/17 159 lb 11.2 oz (72.4 kg)              We Performed the Following     CBC with platelets     CHICKEN POX VACCINE,LIVE,SUBCUT     Comprehensive metabolic panel (BMP + Alb, Alk Phos, ALT, AST, Total. Bili, TP)     MMR VIRUS IMMUNIZATION, SUBCUT          Today's Medication Changes          These changes are accurate as of: 5/12/17 11:41 AM.  If you have any questions, ask your nurse or doctor.               Start taking these medicines.        Dose/Directions    omeprazole 20 MG CR capsule   Commonly known as:  priLOSEC   Used for:  Other acute gastritis   Started by:  González Ko MD        Dose:  20 mg   Take 1 capsule (20 mg) by mouth daily   Quantity:  30 capsule   Refills:  1            Where to get your medicines      These medications were sent to [id number] - [place], [place] - [address], [place] [address]     Phone:  [phone]     omeprazole 20 MG CR capsule                Primary Care Provider Office Phone # Fax #    González Ko [phone]       [facility] [address] [facility] [address]  [facility] [address]        Thank you!     Thank you for choosing [facility]  for your care. Our goal is always to provide you with excellent care. Hearing back from our patients is one way we can continue to improve our services. Please take a few minutes to complete the written survey that you may receive in the mail after your visit with us. Thank you!             Your Updated Medication List - Protect others around you: Learn how to safely use, store and throw away your medicines at www.disposemymeds.org.          This list is accurate as of: 5/12/17 11:41 AM.  Always use your most recent med list.                   Brand Name Dispense  Instructions for use    albuterol 108 (90 BASE) MCG/ACT Inhaler    PROAIR HFA/PROVENTIL HFA/VENTOLIN HFA    1 Inhaler    Inhale 2 puffs into the lungs every 6 hours as needed for shortness of breath / dyspnea or wheezing       imiquimod 5 % cream    ALDARA    12 packet    Apply a small sized amount to warts or molluscum three times weekly at bedtime.   Wash off after 8 hours.   May use for up to 16 weeks.       omeprazole 20 MG CR capsule    priLOSEC    30 capsule    Take 1 capsule (20 mg) by mouth daily

## 2017-05-13 LAB
ALBUMIN SERPL-MCNC: 4.3 G/DL (ref 3.4–5)
ALP SERPL-CCNC: 87 U/L (ref 40–150)
ALT SERPL W P-5'-P-CCNC: 22 U/L (ref 0–50)
ANION GAP SERPL CALCULATED.3IONS-SCNC: 9 MMOL/L (ref 3–14)
AST SERPL W P-5'-P-CCNC: 14 U/L (ref 0–45)
BILIRUB SERPL-MCNC: 0.4 MG/DL (ref 0.2–1.3)
BUN SERPL-MCNC: 12 MG/DL (ref 7–30)
CALCIUM SERPL-MCNC: 9.3 MG/DL (ref 8.5–10.1)
CHLORIDE SERPL-SCNC: 105 MMOL/L (ref 94–109)
CO2 SERPL-SCNC: 25 MMOL/L (ref 20–32)
CREAT SERPL-MCNC: 0.66 MG/DL (ref 0.52–1.04)
GFR SERPL CREATININE-BSD FRML MDRD: NORMAL ML/MIN/1.7M2
GLUCOSE SERPL-MCNC: 87 MG/DL (ref 70–99)
POTASSIUM SERPL-SCNC: 4.2 MMOL/L (ref 3.4–5.3)
PROT SERPL-MCNC: 8 G/DL (ref 6.8–8.8)
SODIUM SERPL-SCNC: 139 MMOL/L (ref 133–144)

## 2017-06-08 PROCEDURE — 87338 HPYLORI STOOL AG IA: CPT | Performed by: FAMILY MEDICINE

## 2017-06-08 PROCEDURE — 87209 SMEAR COMPLEX STAIN: CPT | Performed by: FAMILY MEDICINE

## 2017-06-08 PROCEDURE — 87177 OVA AND PARASITES SMEARS: CPT | Performed by: FAMILY MEDICINE

## 2017-06-09 DIAGNOSIS — K21.9 GASTROESOPHAGEAL REFLUX DISEASE, ESOPHAGITIS PRESENCE NOT SPECIFIED: ICD-10-CM

## 2017-06-09 DIAGNOSIS — K29.00 OTHER ACUTE GASTRITIS: ICD-10-CM

## 2017-06-12 LAB
H PYLORI AG STL QL IA: NORMAL
MICRO REPORT STATUS: NORMAL
MICRO REPORT STATUS: NORMAL
O+P STL MICRO: NORMAL
SPECIMEN SOURCE: NORMAL
SPECIMEN SOURCE: NORMAL

## 2017-07-05 ENCOUNTER — OFFICE VISIT (OUTPATIENT)
Dept: OPHTHALMOLOGY | Facility: CLINIC | Age: 37
End: 2017-07-05

## 2017-07-05 DIAGNOSIS — H15.101 EPISCLERITIS OF RIGHT EYE: Primary | ICD-10-CM

## 2017-07-05 DIAGNOSIS — H10.13 ALLERGIC CONJUNCTIVITIS, BILATERAL: ICD-10-CM

## 2017-07-05 RX ORDER — OLOPATADINE HYDROCHLORIDE 2 MG/ML
1 SOLUTION/ DROPS OPHTHALMIC DAILY
Qty: 1 BOTTLE | Refills: 3 | Status: SHIPPED | OUTPATIENT
Start: 2017-07-05 | End: 2018-07-26

## 2017-07-05 RX ORDER — INDOMETHACIN 50 MG/1
50 CAPSULE ORAL
Qty: 42 CAPSULE | Refills: 0 | Status: SHIPPED | OUTPATIENT
Start: 2017-07-05 | End: 2018-12-05

## 2017-07-05 ASSESSMENT — TONOMETRY
OS_IOP_MMHG: 15
IOP_METHOD: ICARE
OD_IOP_MMHG: 13

## 2017-07-05 ASSESSMENT — CUP TO DISC RATIO
OD_RATIO: 0.35
OS_RATIO: 0.5

## 2017-07-05 ASSESSMENT — SLIT LAMP EXAM - LIDS
COMMENTS: NORMAL
COMMENTS: NORMAL

## 2017-07-05 ASSESSMENT — VISUAL ACUITY
OD_SC: 20/20
METHOD: SNELLEN - LINEAR
OS_SC: 20/25

## 2017-07-05 ASSESSMENT — CONF VISUAL FIELD: OS_NORMAL: 1

## 2017-07-05 ASSESSMENT — EXTERNAL EXAM - RIGHT EYE: OD_EXAM: NORMAL

## 2017-07-05 ASSESSMENT — EXTERNAL EXAM - LEFT EYE: OS_EXAM: NORMAL

## 2017-07-05 NOTE — MR AVS SNAPSHOT
After Visit Summary   7/5/2017    Lindsay Payne    MRN: [id number]           Patient Information     Date Of Birth          1980        Visit Information        Provider Department      7/5/2017 1:00 PM Chacorta Kern, [facility]QUE [facility]        Today's Diagnoses     Episcleritis of right eye    -  1    Allergic conjunctivitis, bilateral           Follow-ups after your visit        Follow-up notes from your care team     Return in about 3 weeks (around 7/26/2017) for Follow Up.      Your next 10 appointments already scheduled     Jul 06, 2017 10:30 AM CDT   Office Visit with Leslie Botello CNM   [facility]    [address] [place] [id number] [phone]           Bring a current list of meds and any records pertaining to this visit.  For Physicals, please bring immunization records and any forms needing to be filled out.  Please arrive 10 minutes early to complete paperwork.              Who to contact     Please call your clinic at [phone] to:    Ask questions about your health    Make or cancel appointments    Discuss your medicines    Learn about your test results    Speak to your doctor   If you have compliments or concerns about an experience at your clinic, or if you wish to file a complaint, please contact [facility] Patient Relations at [phone] or email us at [email or web address]         Additional Information About Your Visit        MyChart Information     Liquid Roboticst gives you secure access to your electronic health record. If you see a primary care provider, you can also send messages to your care team and make appointments. If you have questions, please call your primary care clinic.  If you do not have a primary care provider, please call [phone] and they will assist you.      SIVI is an electronic gateway that provides easy, online access to  your medical records. With CrystalCommerce, you can request a clinic appointment, read your test results, renew a prescription or communicate with your care team.     To access your existing account, please contact your [facility] or call [phone] for assistance.        Care EveryWhere ID     This is your Care EveryWhere ID. This could be used by other organizations to access your [place] medical records  [id number]         Blood Pressure from Last 3 Encounters:   05/12/17 100/72   04/18/17 98/64   03/16/17 102/70    Weight from Last 3 Encounters:   05/12/17 72.4 kg (159 lb 11.2 oz)   04/18/17 72.8 kg (160 lb 8 oz)   03/16/17 72.4 kg (159 lb 11.2 oz)              Today, you had the following     No orders found for display         Today's Medication Changes          These changes are accurate as of: 7/5/17  1:57 PM.  If you have any questions, ask your nurse or doctor.               Start taking these medicines.        Dose/Directions    indomethacin 50 MG capsule   Commonly known as:  INDOCIN   Used for:  Episcleritis of right eye   Started by:  Chacorta Kern, OD        Dose:  50 mg   Take 1 capsule (50 mg) by mouth 3 times daily (with meals) for 14 days   Quantity:  42 capsule   Refills:  0       olopatadine HCl 0.2 % Soln   Commonly known as:  PATADAY   Used for:  Allergic conjunctivitis, bilateral   Started by:  Chacorta Kern, OD        Dose:  1 drop   Place 1 drop into both eyes daily   Quantity:  1 Bottle   Refills:  3            Where to get your medicines      These medications were sent to [facility] [address] [facility]  [address] [facility] [address]     Phone:  [phone]     indomethacin 50 MG capsule    olopatadine HCl 0.2 % Soln                Primary Care Provider Office Phone # Fax #    González Ko [phone]       [facility] [address] [facility] [address]  [facility] [address]         Equal Access to Services     [facility] HospitalMARYAM : Hadii raymond dominguez sahil Sonalini, waaxda luqadaha, qaybta kaalmakatlin neal, gladys novak. So [facility] [phone].    ATENCIÓN: Si habla yaz, tiene a erickson disposición servicios gratuitos de asistencia lingüística. Kayliname al [phone].    We comply with applicable federal civil rights laws and [place] laws. We do not discriminate on the basis of race, color, national origin, age, disability sex, sexual orientation or gender identity.            Thank you!     Thank you for choosing [facility]  for your care. Our goal is always to provide you with excellent care. Hearing back from our patients is one way we can continue to improve our services. Please take a few minutes to complete the written survey that you may receive in the mail after your visit with us. Thank you!             Your Updated Medication List - Protect others around you: Learn how to safely use, store and throw away your medicines at www.disposemymeds.org.          This list is accurate as of: 7/5/17  1:57 PM.  Always use your most recent med list.                   Brand Name Dispense Instructions for use Diagnosis    albuterol 108 (90 BASE) MCG/ACT Inhaler    PROAIR HFA/PROVENTIL HFA/VENTOLIN HFA    1 Inhaler    Inhale 2 puffs into the lungs every 6 hours as needed for shortness of breath / dyspnea or wheezing    SOB (shortness of breath)       imiquimod 5 % cream    ALDARA    12 packet    Apply a small sized amount to warts or molluscum three times weekly at bedtime.   Wash off after 8 hours.   May use for up to 16 weeks.    Common wart       indomethacin 50 MG capsule    INDOCIN    42 capsule    Take 1 capsule (50 mg) by mouth 3 times daily (with meals) for 14 days    Episcleritis of right eye       olopatadine HCl 0.2 % Soln    PATADAY    1 Bottle    Place 1 drop into both eyes daily    Allergic conjunctivitis, bilateral       omeprazole 20 MG CR capsule     priLOSEC    30 capsule    Take 1 capsule (20 mg) by mouth daily    Other acute gastritis

## 2017-07-05 NOTE — PROGRESS NOTES
Assessment/Plan  (H15.101) Episcleritis of right eye  (primary encounter diagnosis)  Comment: First occurrence, unknown etiology  Plan: indomethacin (INDOCIN) 50 MG capsule   Educated patient on condition and clinical findings. Prescribed indomethacin 50mg po tid x 2 weeks. Return to clinic in 3 weeks for follow-up, or sooner if symptoms fail to improve.    (H10.13) Allergic conjunctivitis, bilateral  Comment: Symptomatic OU  Plan: olopatadine HCl (PATADAY) 0.2 % SOLN   Prescribed 0.2% olopatadine qd OU. Monitor at follow-up.    Dilation and refraction to be performed at follow-up.      Complete documentation of historical and exam elements from today's encounter can  be found in the full encounter summary report (not reduplicated in this progress  note). I personally obtained the chief complaint(s) and history of present illness. I  confirmed and edited as necessary the review of systems, past medical/surgical  history, family history, social history, and examination findings as documented by  others; and I examined the patient myself. I personally reviewed the relevant tests,  images, and reports as documented above. I formulated and edited as necessary the  assessment and plan and discussed the findings and management plan with the  patient and family.    Chacorta Kern OD, FAAO

## 2017-07-06 ENCOUNTER — OFFICE VISIT (OUTPATIENT)
Dept: MIDWIFE SERVICES | Facility: CLINIC | Age: 37
End: 2017-07-06
Payer: COMMERCIAL

## 2017-07-06 VITALS
SYSTOLIC BLOOD PRESSURE: 94 MMHG | TEMPERATURE: 98.1 F | HEART RATE: 68 BPM | WEIGHT: 162 LBS | DIASTOLIC BLOOD PRESSURE: 69 MMHG | BODY MASS INDEX: 25.43 KG/M2 | HEIGHT: 67 IN

## 2017-07-06 DIAGNOSIS — Z30.432 ENCOUNTER FOR IUD REMOVAL: Primary | ICD-10-CM

## 2017-07-06 DIAGNOSIS — Z11.51 SCREENING FOR HUMAN PAPILLOMAVIRUS: ICD-10-CM

## 2017-07-06 PROCEDURE — 87624 HPV HI-RISK TYP POOLED RSLT: CPT | Performed by: ADVANCED PRACTICE MIDWIFE

## 2017-07-06 PROCEDURE — 58300 INSERT INTRAUTERINE DEVICE: CPT | Mod: 52 | Performed by: ADVANCED PRACTICE MIDWIFE

## 2017-07-06 PROCEDURE — 58301 REMOVE INTRAUTERINE DEVICE: CPT | Performed by: ADVANCED PRACTICE MIDWIFE

## 2017-07-06 PROCEDURE — G0145 SCR C/V CYTO,THINLAYER,RESCR: HCPCS | Performed by: ADVANCED PRACTICE MIDWIFE

## 2017-07-06 NOTE — MR AVS SNAPSHOT
"              After Visit Summary   7/6/2017    Lindsay Payne    MRN: [id number]           Patient Information     Date Of Birth          1980        Visit Information        Provider Department      7/6/2017 10:30 AM Leslie Botello CNM [facility]        Today's Diagnoses     Encounter for IUD removal    -  1    Screening for human papillomavirus           Follow-ups after your visit        Who to contact     If you have questions or need follow up information about today's clinic visit or your schedule please contact [facility] directly at [phone].  Normal or non-critical lab and imaging results will be communicated to you by [employer], letter or phone within 4 business days after the clinic has received the results. If you do not hear from us within 7 days, please contact the clinic through [employer] or phone. If you have a critical or abnormal lab result, we will notify you by phone as soon as possible.  Submit refill requests through [employer] or call your pharmacy and they will forward the refill request to us. Please allow 3 business days for your refill to be completed.          Additional Information About Your Visit        MyChart Information     [employer] gives you secure access to your electronic health record. If you see a primary care provider, you can also send messages to your care team and make appointments. If you have questions, please call your primary care clinic.  If you do not have a primary care provider, please call [phone] and they will assist you.        Care EveryWhere ID     This is your Care EveryWhere ID. This could be used by other organizations to access your [facility] medical records  [id number]        Your Vitals Were     Pulse Temperature Height Last Period BMI (Body Mass Index)       68 98.1  F (36.7  C) (Oral) 5' 6.5\" (1.689 m) 06/22/2017 25.76 kg/m2        Blood Pressure from Last 3 Encounters:   07/06/17 94/69   05/12/17 100/72 "   04/18/17 98/64    Weight from Last 3 Encounters:   07/06/17 162 lb (73.5 kg)   05/12/17 159 lb 11.2 oz (72.4 kg)   04/18/17 160 lb 8 oz (72.8 kg)              We Performed the Following     Pap imaged thin layer screen with HPV - recommended age 30 - 65 years (select HPV order below)     REMOVE INTRAUTERINE DEVICE        Primary Care Provider Office Phone # Fax #    González Shorty Ko [phone]       [facility] [address] [facility] [address]  [facility] [address]        Equal Access to Services     [facility] : Hadii raymond dominguez hadasho Sonalini, waaxda luqadaha, qaybta kaalmada leanderyakatlin, gladys arnold . So [facility] [phone].    ATENCIÓN: Si habla español, tiene a erickson disposición servicios gratuitos de asistencia lingüística. [facility] [phone].    We comply with applicable federal civil rights laws and [place] laws. We do not discriminate on the basis of race, color, national origin, age, disability sex, sexual orientation or gender identity.            Thank you!     Thank you for choosing [facility]  [place] your [place]. Our goal is always to provide you with excellent care. Hearing back from our patients is one way we can continue to improve our services. Please take a few minutes to complete the written survey that you may receive in the mail after your visit with us. Thank you!             Your Updated Medication List - Protect others around you: Learn how to safely use, store and throw away your medicines at www.disposemymeds.org.          This list is accurate as of: 7/6/17 11:22 AM.  Always use your most recent med list.                   Brand Name Dispense Instructions for use Diagnosis    albuterol 108 (90 BASE) MCG/ACT Inhaler    PROAIR HFA/PROVENTIL HFA/VENTOLIN HFA    1 Inhaler    Inhale 2 puffs into the lungs every 6 hours as needed for shortness of breath / dyspnea or wheezing    SOB (shortness of breath)       imiquimod 5 % cream     ALDARA    12 packet    Apply a small sized amount to warts or molluscum three times weekly at bedtime.   Wash off after 8 hours.   May use for up to 16 weeks.    Common wart       indomethacin 50 MG capsule    INDOCIN    42 capsule    Take 1 capsule (50 mg) by mouth 3 times daily (with meals) for 14 days    Episcleritis of right eye       olopatadine HCl 0.2 % Soln    PATADAY    1 Bottle    Place 1 drop into both eyes daily    Allergic conjunctivitis, bilateral       omeprazole 20 MG CR capsule    priLOSEC    30 capsule    Take 1 capsule (20 mg) by mouth daily    Other acute gastritis

## 2017-07-06 NOTE — NURSING NOTE
"Chief Complaint   Patient presents with     Procedure     iud removal amd replacement        Initial BP 94/69  Pulse 68  Temp 98.1  F (36.7  C) (Oral)  Ht 5' 6.5\" (1.689 m)  Wt 162 lb (73.5 kg)  LMP 2017  BMI 25.76 kg/m2 Estimated body mass index is 25.76 kg/(m^2) as calculated from the following:    Height as of this encounter: 5' 6.5\" (1.689 m).    Weight as of this encounter: 162 lb (73.5 kg).  BP completed using cuff size: regular        The following HM Due: pap smear      The following patient reported/Care Every where data was sent to:  P ABSTRACT QUALITY INITIATIVES [[id number]]       N/a        Negin Chi MA               "

## 2017-07-06 NOTE — PROGRESS NOTES
INDICATIONS:                                                      Is a pregnancy test required: No.  Was a consent obtained?  Yes    Lindsay Payne is a 36 year old female,, Patient's last menstrual period was 2017. who presents today for IUD removal. Her current IUD was placed 2 ago. She has had problems including heavy bleeding and discomfort with the IUD. She requests removal of the IUD because of problems stated above  She would like a Mirena IUD.  She is also due for a pap and would like that done today.      Today's PHQ-2 Score:   PHQ-2 (  [employer]) 2017   Q1: Little interest or pleasure in doing things 0   Q2: Feeling down, depressed or hopeless -   PHQ-2 Score -       PROCEDURE:                                                      A speculum exam was performed and the cervix was visualized. The IUD string was visualized. Using ring forceps, the string  was grasped and the IUD removed intact.    Attempted to place Mirena IUD - unable to pass internal cervical os    Pap collected     POST PROCEDURE:                                                      The patient tolerated the procedure well. Patient was discharged in stable condition.    Call if bleeding, pain or fever occur., Birth control counseling given. and Reinsertion of IUD scheduled.- she will come back when she has her period.  She will use condoms in the meantime.      She verbalizes understanding and agreement with plan.      Leslie Botello CNM

## 2017-07-10 LAB
COPATH REPORT: NORMAL
PAP: NORMAL

## 2017-07-11 LAB
FINAL DIAGNOSIS: NORMAL
HPV HR 12 DNA CVX QL NAA+PROBE: NEGATIVE
HPV16 DNA SPEC QL NAA+PROBE: NEGATIVE
HPV18 DNA SPEC QL NAA+PROBE: NEGATIVE
SPECIMEN DESCRIPTION: NORMAL

## 2017-08-02 ENCOUNTER — OFFICE VISIT (OUTPATIENT)
Dept: OPHTHALMOLOGY | Facility: CLINIC | Age: 37
End: 2017-08-02

## 2017-08-02 DIAGNOSIS — H52.222 REGULAR ASTIGMATISM OF LEFT EYE: ICD-10-CM

## 2017-08-02 DIAGNOSIS — H40.003 GLAUCOMA SUSPECT OF BOTH EYES: Primary | ICD-10-CM

## 2017-08-02 DIAGNOSIS — H15.101 EPISCLERITIS OF RIGHT EYE: ICD-10-CM

## 2017-08-02 DIAGNOSIS — H10.13 ALLERGIC CONJUNCTIVITIS OF BOTH EYES: ICD-10-CM

## 2017-08-02 ASSESSMENT — SLIT LAMP EXAM - LIDS
COMMENTS: COLLARETTES
COMMENTS: COLLARETTES

## 2017-08-02 ASSESSMENT — REFRACTION_MANIFEST
OS_CYLINDER: +0.50
OD_CYLINDER: +0.75
OD_SPHERE: -1.25
OD_AXIS: 164
OS_SPHERE: -1.00
OS_AXIS: 110

## 2017-08-02 ASSESSMENT — REFRACTION_WEARINGRX
OD_AXIS: 030
OD_CYLINDER: +0.25
OS_SPHERE: -0.50
OS_CYLINDER: +0.75
OS_AXIS: 100
OD_SPHERE: -0.25

## 2017-08-02 ASSESSMENT — REFRACTION
OS_CYLINDER: +0.50
OD_CYLINDER: SPHERE
OS_SPHERE: -0.25
OD_SPHERE: +0.50
OS_AXIS: 096

## 2017-08-02 ASSESSMENT — TONOMETRY
OD_IOP_MMHG: 15
IOP_METHOD: TONOPEN
OS_IOP_MMHG: 15

## 2017-08-02 ASSESSMENT — CONF VISUAL FIELD
OD_NORMAL: 1
OS_NORMAL: 1
METHOD: COUNTING FINGERS

## 2017-08-02 ASSESSMENT — VISUAL ACUITY
CORRECTION_TYPE: GLASSES
METHOD: SNELLEN - LINEAR
OD_CC: 20/20
OS_CC+: -3
OS_CC: 20/20

## 2017-08-02 ASSESSMENT — CUP TO DISC RATIO
OD_RATIO: 0.35
OS_RATIO: 0.5

## 2017-08-02 ASSESSMENT — EXTERNAL EXAM - LEFT EYE: OS_EXAM: NORMAL

## 2017-08-02 ASSESSMENT — EXTERNAL EXAM - RIGHT EYE: OD_EXAM: NORMAL

## 2017-08-02 NOTE — MR AVS SNAPSHOT
After Visit Summary   8/2/2017    Lindsay Payne    MRN: [id number]           Patient Information     Date Of Birth          1980        Visit Information        Provider Department      8/2/2017 2:00 PM Chacorta Kern, OD [facility]        Today's Diagnoses     Glaucoma suspect of both eyes    -  1    Episcleritis of right eye        Allergic conjunctivitis of both eyes        Regular astigmatism of left eye           Follow-ups after your visit        Follow-up notes from your care team     Return in about 1 year (around 8/2/2018) for Annual Visit.      Who to contact     Please call your clinic at [phone] to:    Ask questions about your health    Make or cancel appointments    Discuss your medicines    Learn about your test results    Speak to your doctor   If you have compliments or concerns about an experience at your clinic, or if you wish to file a complaint, please contact [facility] Patient Relations at [phone] or email us at [email or web address]         Additional Information About Your Visit        MyChart Information     [employer] gives you secure access to your electronic health record. If you see a primary care provider, you can also send messages to your care team and make appointments. If you have questions, please call your primary care clinic.  If you do not have a primary care provider, please call [phone] and they will assist you.      [employer] is an electronic gateway that provides easy, online access to your medical records. With [employer], you can request a clinic appointment, read your test results, renew a prescription or communicate with your care team.     To access your existing account, please contact your [facility] Clinic or call [phone] for assistance.        Care EveryWhere ID     This is your Care EveryWhere ID. This could be used by other organizations to access your  [place] medical records  [id number]        Your Vitals Were     Last Period                   06/22/2017            Blood Pressure from Last 3 Encounters:   07/06/17 94/69   05/12/17 100/72   04/18/17 98/64    Weight from Last 3 Encounters:   07/06/17 73.5 kg (162 lb)   05/12/17 72.4 kg (159 lb 11.2 oz)   04/18/17 72.8 kg (160 lb 8 oz)              We Performed the Following     OCT Optic Nerve RNFL Spectralis OU (both eyes)     REFRACTION [[id number]]        Primary Care Provider Office Phone # Fax #    González Shorty Ko [phone]       [facility] [address] [facility] [address]  [facility] [address]        Equal Access to Services     [employer] M[employer] AH: Hadii raymond dominguez hadasho Soomaali, waaxda luqadaha, qaybta kaalmada adeegyada, waxay galo haymichael novak. So [facility] [phone].    ATENCIÓN: Si habla español, tiene a erickson disposición servicios gratuitos de asistencia lingüística. Llame al [phone].    We comply with applicable federal civil rights laws and [place] laws. We do not discriminate on the basis of race, color, national origin, age, disability sex, sexual orientation or gender identity.            Thank you!     Thank you for choosing [facility]  for your care. Our goal is always to provide you with excellent care. Hearing back from our patients is one way we can continue to improve our services. Please take a few minutes to complete the written survey that you may receive in the mail after your visit with us. Thank you!             Your Updated Medication List - Protect others around you: Learn how to safely use, store and throw away your medicines at www.disposemymeds.org.          This list is accurate as of: 8/2/17  3:30 PM.  Always use your most recent med list.                   Brand Name Dispense Instructions for use Diagnosis    albuterol 108 (90 BASE) MCG/ACT Inhaler    PROAIR HFA/PROVENTIL HFA/VENTOLIN HFA    1 Inhaler    Inhale 2 puffs into the  lungs every 6 hours as needed for shortness of breath / dyspnea or wheezing    SOB (shortness of breath)       imiquimod 5 % cream    ALDARA    12 packet    Apply a small sized amount to warts or molluscum three times weekly at bedtime.   Wash off after 8 hours.   May use for up to 16 weeks.    Common wart       indomethacin 50 MG capsule    INDOCIN    42 capsule    Take 1 capsule (50 mg) by mouth 3 times daily (with meals) for 14 days    Episcleritis of right eye       olopatadine HCl 0.2 % Soln    PATADAY    1 Bottle    Place 1 drop into both eyes daily    Allergic conjunctivitis, bilateral       omeprazole 20 MG CR capsule    priLOSEC    30 capsule    Take 1 capsule (20 mg) by mouth daily    Other acute gastritis

## 2017-08-02 NOTE — PROGRESS NOTES
Assessment/Plan:    (H15.101) Episcleritis of right eye  Comment: Resolved  Plan: Discontinue oral indomethacin.    (H10.13) Allergic conjunctivitis of both eyes  Comment: Improved with treatment  Plan: Continue 0.2% olopatadine qd OU.    (H52.222) Regular astigmatism of left eye  Comment: Hyperopia OD, Mixed astigmatism OS; unreliable refraction, fluctuating vision; excellent uncorrected vision.  Plan: Did not prescribe glasses at this time. Educated patient that glasses would likely not improve vision.    (H40.003) Glaucoma suspect of both eyes  (primary encounter diagnosis)  Comment: Suspect due to optic nerve asymmetry. Normal OCT.  Plan: OCT Optic Nerve RNFL Spectralis OU (both eyes)  Monitor annually.    Janet Cornell, Optometry Extern    Teaching Statement:    Complete documentation of historical and exam elements from today's encounter can  be found in the full encounter summary report (not reduplicated in this progress  note). I personally obtained the chief complaint(s) and history of present illness. I  confirmed and edited as necessary the review of systems, past medical/surgical  history, family history, social history, and examination findings as documented by  others; and I examined the patient myself. I personally reviewed the relevant tests,  images, and reports as documented above. I formulated and edited as necessary the  assessment and plan and discussed the findings and management plan with the  patient and family.    Chacorta Kern, OD, FAAO

## 2017-08-02 NOTE — NURSING NOTE
Chief Complaints and History of Present Illnesses   Patient presents with     Follow Up For     Episcleritis of right eye     COMPREHENSIVE EYE EXAM     HPI    Affected eye(s):  Both   Symptoms:     No blurred vision      Frequency:  Constant       Do you have eye pain now?:  No      Comments:  Pt states no problems with vision, eyes feel good. No longer using drops. Pt wears gls, last exam 1 year ago.    Nasreen Villalba COT 2:04 PM August 2, 2017

## 2017-09-05 ENCOUNTER — OFFICE VISIT (OUTPATIENT)
Dept: OBGYN | Facility: CLINIC | Age: 37
End: 2017-09-05
Payer: COMMERCIAL

## 2017-09-05 VITALS
TEMPERATURE: 97.7 F | DIASTOLIC BLOOD PRESSURE: 69 MMHG | SYSTOLIC BLOOD PRESSURE: 106 MMHG | WEIGHT: 163 LBS | HEART RATE: 70 BPM | BODY MASS INDEX: 25.91 KG/M2

## 2017-09-05 DIAGNOSIS — Z30.430 ENCOUNTER FOR IUD INSERTION: Primary | ICD-10-CM

## 2017-09-05 LAB — BETA HCG QUAL IFA URINE: NEGATIVE

## 2017-09-05 PROCEDURE — 84703 CHORIONIC GONADOTROPIN ASSAY: CPT | Performed by: OBSTETRICS & GYNECOLOGY

## 2017-09-05 PROCEDURE — 58300 INSERT INTRAUTERINE DEVICE: CPT | Performed by: OBSTETRICS & GYNECOLOGY

## 2017-09-05 NOTE — PATIENT INSTRUCTIONS

## 2017-09-05 NOTE — MR AVS SNAPSHOT
After Visit Summary   9/5/2017    Lindsay Payne    MRN: [id number]           Patient Information     Date Of Birth          1980        Visit Information        Provider Department      9/5/2017 3:15 PM Deepika Marion MD [facility]        Today's Diagnoses     Encounter for IUD insertion    -  1      Care Instructions    What Mirena Users May Expect    What to watch for right after Mirena is placed  Some women may experience uterine cramps, bleeding, and/or dizziness during and right after Mirena is placed. To help minimize the cramps, you may taken ibuprofen 600 mg with food prior to your appointment. These symptoms should improve over the next 24 hours.  Mild cramping may be present for a few days after your placement  As a follow up, you should check your strings on 4 weeks or visit your clinic once in the first 4 to 12 weeks after Mirena is placed to make sure it is in the right position. After that, Mirena can be checked once a year as part of your routine exam.    Please use a back-up method (abstinence or condoms) for 5 days after placement.    Your periods may change  For the first 3 to 6 months, your monthly period may become irregular. You may also have frequent spotting or light bleeding. A few women have heavy bleeding during this time. After your body adjusts, the number of bleeding days is likely to decrease (but may remain irregular), and you may even find that your periods stop altogether for as long as Mirena is in place. Around the end of the third month of use, you may see up to a 75% reduction in the amount of menstrual bleeding. By one year, about 1 out of 5 users may hay have no period at all. At the end of two years, 70% have little or no bleeding. Your periods will return rapidly once Mirena is removed.     Mirena Strings  You may check your own Mirena strings by inserting a finger into the vagina and feeling the strings as they exit the cervix.  The  strings will initially feel firm, like fishing line, but will soften over a few weeks.  After the strings have softened, you or your partner should not be able to feel the strings during intercourse.  If you can feel the IUD, see your healthcare provider to have the position confirmed.  You may use tampons with Mirena in place.    Mirena does not protect against HIV or STDs.  Mirena does not prevent the formation of ovarian cysts.  Mirena does not typically reduce acne or cause weight gain or mood changes.    Please call [facility] at [phone] if you have questions or concerns.    For more information:  [email or web address]            Follow-ups after your visit        Who to contact     If you have questions or need follow up information about today's clinic visit or your schedule please contact [facility] directly at [phone].  Normal or non-critical lab and imaging results will be communicated to you by MyChart, letter or phone within 4 business days after the clinic has received the results. If you do not hear from us within 7 days, please contact the clinic through [employer] or phone. If you have a critical or abnormal lab result, we will notify you by phone as soon as possible.  Submit refill requests through [employer] or call your pharmacy and they will forward the refill request to us. Please allow 3 business days for your refill to be completed.          Additional Information About Your Visit        [employer] Information     [employer] gives you secure access to your electronic health record. If you see a primary care provider, you can also send messages to your care team and make appointments. If you have questions, please call your primary care clinic.  If you do not have a primary care provider, please call [phone] and they will assist you.        Care EveryWhere ID     This is your Care EveryWhere ID. This could be used by other organizations to access your  [place] medical records  [id number]        Your Vitals Were     Pulse Temperature Last Period BMI (Body Mass Index)          70 97.7  F (36.5  C) (Oral) 08/15/2017 25.91 kg/m2         Blood Pressure from Last 3 Encounters:   09/05/17 106/69   07/06/17 94/69   05/12/17 100/72    Weight from Last 3 Encounters:   09/05/17 163 lb (73.9 kg)   07/06/17 162 lb (73.5 kg)   05/12/17 159 lb 11.2 oz (72.4 kg)              We Performed the Following     Beta HCG qual IFA urine        Primary Care Provider Office Phone # Fax #    González Shorty Ko [phone]       [address] [address]        Equal Access to Services     [facility]: Hadii raymond dominguez hadasho Soomaali, waaxda luqadaha, qaybta kaalmada adeegyada, gladys arnold . So [facility] [phone].    ATENCIÓN: Si habla español, tiene a erickson disposición servicios gratuitos de asistencia lingüística. Micheal al [phone].    We comply with applicable federal civil rights laws and [place] laws. We do not discriminate on the basis of race, color, national origin, age, disability sex, sexual orientation or gender identity.            Thank you!     Thank you for [place] [facility]  [place] your [place]. Our goal is always to provide you with excellent care. Hearing back from our patients is one way we can continue to improve our services. Please take a few minutes to complete the written survey that you may receive in the mail after your visit with us. Thank you!             Your Updated Medication List - Protect others around you: Learn how to safely use, store and throw away your medicines at www.disposemymeds.org.          This list is accurate as of: 9/5/17  3:25 PM.  Always use your most recent med list.                   Brand Name Dispense Instructions for use Diagnosis    albuterol 108 (90 BASE) MCG/ACT Inhaler    PROAIR HFA/PROVENTIL HFA/VENTOLIN HFA    1 Inhaler    Inhale 2 puffs into  the lungs every 6 hours as needed for shortness of breath / dyspnea or wheezing    SOB (shortness of breath)       imiquimod 5 % cream    ALDARA    12 packet    Apply a small sized amount to warts or molluscum three times weekly at bedtime.   Wash off after 8 hours.   May use for up to 16 weeks.    Common wart       indomethacin 50 MG capsule    INDOCIN    42 capsule    Take 1 capsule (50 mg) by mouth 3 times daily (with meals) for 14 days    Episcleritis of right eye       olopatadine HCl 0.2 % Soln    PATADAY    1 Bottle    Place 1 drop into both eyes daily    Allergic conjunctivitis, bilateral       omeprazole 20 MG CR capsule    priLOSEC    30 capsule    Take 1 capsule (20 mg) by mouth daily    Other acute gastritis

## 2017-09-05 NOTE — NURSING NOTE
"Chief Complaint   Patient presents with     IUD       Initial /69  Pulse 70  Temp 97.7  F (36.5  C) (Oral)  Wt 163 lb (73.9 kg)  LMP 08/15/2017  BMI 25.91 kg/m2 Estimated body mass index is 25.91 kg/(m^2) as calculated from the following:    Height as of 17: 5' 6.5\" (1.689 m).    Weight as of this encounter: 163 lb (73.9 kg).  BP completed using cuff size: regular        The following HM Due: NONE      The following patient reported/Care Every where data was sent to:  P ABSTRACT QUALITY INITIATIVES [[id number]]      lot # [id number]  EXP#  NDC# 62647-761-62  N/a    Negin Chi MA               "

## 2017-09-05 NOTE — PROGRESS NOTES
Lindsay is here for Mirena IUD insertion.    Before insertion we discussed risks of insertion, and she indicated understanding.   All of the patients questions were answered to her apparent satisfaction; and she indicated desire to proceed.    After informed consent, the patient was placed in dorsal lithotomy position. Bimanual exam revealed size and position of uterine corpus.  Strict sterile technique observed. Sterile speculum was placed and cervix and upper vagina prepped with Betadine solution.      Anterior lip of cervix grasped with single toothed tenaculum. Canal finder used with ease, normal anteflexed uterus and canal.  Uterus sounded to 7.0 cm.  Mirena IUD sterilly placed on field, loaded, and guide set to 7.0 cm.  IUD inserted into uterine fundus using 's 2 step technique, w/o trouble.  Patient tolerated procedure well; minimal to no bleeding encountered. Minimal to no pain.  IUD guide removed and string trimmed.    Standard verbal and written precautions given.  Patient discharged from clinic in satisfactory condition.

## 2017-12-18 ENCOUNTER — MYC REFILL (OUTPATIENT)
Dept: FAMILY MEDICINE | Facility: CLINIC | Age: 37
End: 2017-12-18

## 2017-12-18 DIAGNOSIS — K21.9 GASTROESOPHAGEAL REFLUX DISEASE, ESOPHAGITIS PRESENCE NOT SPECIFIED: Primary | ICD-10-CM

## 2017-12-18 NOTE — TELEPHONE ENCOUNTER
Message from An[employer]hart:  Original authorizing provider: MD Mary Fariasalen WILLShana Payne would like a refill of the following medications:  omeprazole (PRILOSEC) 20 MG CR capsule [González Ko MD]    Preferred pharmacy: [facility] - [place], [place] - [address] [place] [address]    Comment:  Hi, I'm almost out of my antacid med and having a very bad heart burn lately. Could you please refill? You could send it to [facility]. Thank you Caro

## 2017-12-19 PROBLEM — K21.9 GASTROESOPHAGEAL REFLUX DISEASE: Status: ACTIVE | Noted: 2017-12-19

## 2017-12-19 PROBLEM — K21.9 GASTROESOPHAGEAL REFLUX DISEASE: Status: RESOLVED | Noted: 2017-12-19 | Resolved: 2017-12-19

## 2017-12-19 NOTE — TELEPHONE ENCOUNTER
Last office visit 5/12/2017    omeprazole (PRILOSEC) 20 MG CR capsule      Last Written Prescription Date:  5/12/2017  Last Fill Quantity: 30,   # refills: 1  Last Office Visit: 5/12/2017  Future Office visit:       Routing refill request to provider for review/approval because:  Acute diagnosis - provider review please - do you want patient to continue?  She is reporting heartburn symptoms - see mychart -there is a break in the medication use Other acute gastritis [K29.00]  - Primary    Thank you,  Ken Dave RN

## 2018-01-04 ENCOUNTER — MYC REFILL (OUTPATIENT)
Dept: FAMILY MEDICINE | Facility: CLINIC | Age: 38
End: 2018-01-04

## 2018-01-04 DIAGNOSIS — K21.9 GASTROESOPHAGEAL REFLUX DISEASE, ESOPHAGITIS PRESENCE NOT SPECIFIED: ICD-10-CM

## 2018-01-04 DIAGNOSIS — B07.8 COMMON WART: ICD-10-CM

## 2018-01-04 NOTE — TELEPHONE ENCOUNTER
Message from testhubt:  Original authorizing provider: Kimmy Baltazar PA-C    Lindsay Payne would like a refill of the following medications:  imiquimod (ALDARA) 5 % cream [Kimmy Baltazar PA-C]    Preferred pharmacy: [address]    Comment:      Medication renewals requested in this message routed to other providers:  omeprazole (PRILOSEC) 20 MG CR capsule [González Ko MD]

## 2018-01-04 NOTE — TELEPHONE ENCOUNTER
Message from [employer]:  Original authorizing provider: MD Lindsay Farias SUMANShana Payne would like a refill of the following medications:  omeprazole (PRILOSEC) 20 MG CR capsule [González Ko MD]    Preferred pharmacy: [address]    Comment:      Medication renewals requested in this message routed to other providers:  imiquimod (ALDARA) 5 % cream [Kimmy Baltazar PA-C]

## 2018-01-05 RX ORDER — IMIQUIMOD 12.5 MG/.25G
CREAM TOPICAL
Qty: 12 PACKET | Refills: 3 | Status: CANCELLED | OUTPATIENT
Start: 2018-01-05

## 2018-01-05 RX ORDER — SALICYLIC ACID 275 MG/ML
SOLUTION TOPICAL
Qty: 10 ML | Refills: 1 | Status: SHIPPED | OUTPATIENT
Start: 2018-01-05 | End: 2019-06-19

## 2018-01-05 NOTE — TELEPHONE ENCOUNTER
Please advise I sent over salicylic acid instead since the other medication hasn't worked. Prescription is basket

## 2018-01-05 NOTE — TELEPHONE ENCOUNTER
Prescription approved per [facility] Refill Protocol.    Maia Suarez RN   [facility]

## 2018-01-05 NOTE — TELEPHONE ENCOUNTER
Juanita    Spoke with pt. It is for the same wart on her thumb, it comes and goes    When/if approved or not send a [employer] message to the pt it was sent to pharm    Maia Suarez RN   [facility]

## 2018-01-05 NOTE — TELEPHONE ENCOUNTER
Routing refill request to provider for review/approval because:  Last script said can be used up to 16 weeks    Maia Suarez RN   [facility]

## 2018-03-27 ENCOUNTER — OFFICE VISIT (OUTPATIENT)
Dept: DERMATOLOGY | Facility: CLINIC | Age: 38
End: 2018-03-27
Payer: COMMERCIAL

## 2018-03-27 DIAGNOSIS — L71.9 ACNE ROSACEA: Primary | ICD-10-CM

## 2018-03-27 DIAGNOSIS — Z86.018 HISTORY OF DYSPLASTIC NEVUS: ICD-10-CM

## 2018-03-27 DIAGNOSIS — D22.9 MULTIPLE NEVI: ICD-10-CM

## 2018-03-27 ASSESSMENT — PAIN SCALES - GENERAL: PAINLEVEL: NO PAIN (0)

## 2018-03-27 NOTE — LETTER
"3/27/2018       RE: Lindsay Payne  [address] [place] [address]     Dear Colleague,    Thank you for referring your patient, Lindsay Payne, to the [facility] DERMATOLOGY at [facility] Please see a copy of my visit note below.    [facility] Dermatology Note      Dermatology Problem List:  1.history of DN, now recurred  2. Rosacea: metrogel, recommend laser for telangiectasia    CC:   Chief Complaint   Patient presents with     Skin Check     Skin check,  Lindsay states \" I have lots of moles that I can not see.\" She would alos like to discuss treatment for red marks on her face.         Encounter Date: Mar 27, 2018    History of Present Illness:  Ms. Lindsay Payne is a 37 year old female who presents to dermatology for a full skin check and to discuss facial redness. She was last seen for a check in 8/15. In 4/2014, a nevus was biopsied on her R breast, which was a moderately dysplastic. She has noted brown color has recurred in that mole. She has not noted any other painful, bleeding or changing lesions. Of note she has a family history of melanoma in a first degree relative.    In terms of facial redness, she notes it has been worsening over the last two years. She has not had any treatments for it, either medical or cosmetic, but is interested in learning about options. She is otherwise well and has no other concerns today.      Past Medical History:   Patient Active Problem List   Diagnosis     CARDIOVASCULAR SCREENING; LDL GOAL LESS THAN 160     Paraguard intrauterine device     Gastroesophageal reflux disease, esophagitis presence not specified     Past Medical History:   Diagnosis Date     NO ACTIVE PROBLEMS      Past Surgical History:   Procedure Laterality Date     APPENDECTOMY  1994       Social History:  The patient denies use of tanning beds. Originally from the [place]    Family History:  + fam history of melanoma in a first degree " relative.    Medications:  Current Outpatient Prescriptions   Medication Sig Dispense Refill     metroNIDAZOLE (METROGEL) 0.75 % topical gel Apply topically 2 times daily 45 g 3     AMOXICILLIN PO        omeprazole (PRILOSEC) 20 MG CR capsule Take 1 capsule (20 mg) by mouth daily 90 capsule 1     olopatadine HCl (PATADAY) 0.2 % SOLN Place 1 drop into both eyes daily 1 Bottle 3     Salicylic Acid 27.5 % LIQD Soak wart in warm water for 5 minutes. Dry area thoroughly. Apply to entire wart surface, allow to dry, and then apply a second time. Avoid contact with surrounding skin. Continue therapy once or twice daily. Resolution may be expected after 4 to 6 weeks; some warts may take longer to remove. (Patient not taking: Reported on 3/27/2018) 10 mL 1     indomethacin (INDOCIN) 50 MG capsule Take 1 capsule (50 mg) by mouth 3 times daily (with meals) for 14 days 42 capsule 0     imiquimod (ALDARA) 5 % cream Apply a small sized amount to warts or molluscum three times weekly at bedtime.   Wash off after 8 hours.   May use for up to 16 weeks. (Patient not taking: Reported on 3/27/2018) 12 packet 3     albuterol (PROAIR HFA/PROVENTIL HFA/VENTOLIN HFA) 108 (90 BASE) MCG/ACT Inhaler Inhale 2 puffs into the lungs every 6 hours as needed for shortness of breath / dyspnea or wheezing (Patient not taking: Reported on 3/27/2018) 1 Inhaler 0     Allergies   Allergen Reactions     Pnv [Prenatal Vit N-Nz-Gveasozxw-Fa] Itching and Rash         Review of Systems:  -As per HPI  -Constitutional: The patient denies fatigue, fevers, chills, unintended weight loss, and night sweats.  -HEENT: Patient denies nonhealing oral sores.  -Skin: As above in HPI. No additional skin concerns.    Physical exam:  Vitals: There were no vitals taken for this visit.  GEN: This is a well developed, well-nourished female in no acute distress, in a pleasant mood.    SKIN: Full skin, which includes the head/face, both arms, chest, back, abdomen,both legs,  genitalia and/or groin buttocks, digits and/or nails, was examined.  -Multiple regular brown pigmented macules and papules are identified on the body.  There is a 4 mm biopsy site on the R breast with pigment recurrent at one lateral edge.   There are no erythematous papules but mild inflammation and scattered telangectasias on the bilateral cheeks and nose.   Scattered brown macules on sun exposed areas.  -No other lesions of concern on areas examined.     Impression/Plan:    1. Rosacea    Will send metrogel daily.    Discussed patient's redness is secondary to telangiectasia. Recommended laser for telangiectasia - discussed treatment with Dr. Burt    2. Multiple clinically benign nevi on the body and Solar lentigines in sun exposed areas    ABCDs of melanoma were discussed and self skin checks were advised.    3. Previously biopsied dysplastic nevus. Path report stated the lesion was consistent with a dysplastic nevus and to monitor for recurrence. This lesion has now recurred.     Previously biopsied     Favor excision with 2mm margin and discussed with patient.    Pt to schedule in surgery clinic          CC González Ferrari on close of this encounter.  Follow-up for excision of dysplastic nevus and yearly for TBSE, sooner prn       Staff Involved:  Staff Only    Lynn Aranda MD, PhD    Dermatology

## 2018-03-27 NOTE — PROGRESS NOTES
"[facility] Dermatology Note      Dermatology Problem List:  1.history of DN, now recurred  2. Rosacea: metrogel, recommend laser for telangiectasia    CC:   Chief Complaint   Patient presents with     Skin Check     Skin check,  Lindsay states \" I have lots of moles that I can not see.\" She would alos like to discuss treatment for red marks on her face.         Encounter Date: Mar 27, 2018    History of Present Illness:  Ms. Lindsay Payne is a 37 year old female who presents to dermatology for a full skin check and to discuss facial redness. She was last seen for a check in 8/15. In 4/2014, a nevus was biopsied on her R breast, which was a moderately dysplastic. She has noted brown color has recurred in that mole. She has not noted any other painful, bleeding or changing lesions. Of note she has a family history of melanoma in a first degree relative.    In terms of facial redness, she notes it has been worsening over the last two years. She has not had any treatments for it, either medical or cosmetic, but is interested in learning about options. She is otherwise well and has no other concerns today.      Past Medical History:   Patient Active Problem List   Diagnosis     CARDIOVASCULAR SCREENING; LDL GOAL LESS THAN 160     Paraguard intrauterine device     Gastroesophageal reflux disease, esophagitis presence not specified     Past Medical History:   Diagnosis Date     NO ACTIVE PROBLEMS      Past Surgical History:   Procedure Laterality Date     APPENDECTOMY  1994       Social History:  The patient denies use of tanning beds. Originally [facility] the [facility]    Family History:  + fam history of melanoma in a first degree relative.    Medications:  Current Outpatient Prescriptions   Medication Sig Dispense Refill     metroNIDAZOLE (METROGEL) 0.75 % topical gel Apply topically 2 times daily 45 g 3     AMOXICILLIN PO        omeprazole (PRILOSEC) 20 MG CR capsule Take 1 capsule (20 mg) by mouth " daily 90 capsule 1     olopatadine HCl (PATADAY) 0.2 % SOLN Place 1 drop into both eyes daily 1 Bottle 3     Salicylic Acid 27.5 % LIQD Soak wart in warm water for 5 minutes. Dry area thoroughly. Apply to entire wart surface, allow to dry, and then apply a second time. Avoid contact with surrounding skin. Continue therapy once or twice daily. Resolution may be expected after 4 to 6 weeks; some warts may take longer to remove. (Patient not taking: Reported on 3/27/2018) 10 mL 1     indomethacin (INDOCIN) 50 MG capsule Take 1 capsule (50 mg) by mouth 3 times daily (with meals) for 14 days 42 capsule 0     imiquimod (ALDARA) 5 % cream Apply a small sized amount to warts or molluscum three times weekly at bedtime.   Wash off after 8 hours.   May use for up to 16 weeks. (Patient not taking: Reported on 3/27/2018) 12 packet 3     albuterol (PROAIR HFA/PROVENTIL HFA/VENTOLIN HFA) 108 (90 BASE) MCG/ACT Inhaler Inhale 2 puffs into the lungs every 6 hours as needed for shortness of breath / dyspnea or wheezing (Patient not taking: Reported on 3/27/2018) 1 Inhaler 0     Allergies   Allergen Reactions     Pnv [Prenatal Vit Q-Gk-Xbgdsosvc-Fa] Itching and Rash         Review of Systems:  -As per HPI  -Constitutional: The patient denies fatigue, fevers, chills, unintended weight loss, and night sweats.  -HEENT: Patient denies nonhealing oral sores.  -Skin: As above in HPI. No additional skin concerns.    Physical exam:  Vitals: There were no vitals taken for this visit.  GEN: This is a well developed, well-nourished female in no acute distress, in a pleasant mood.    SKIN: Full skin, which includes the head/face, both arms, chest, back, abdomen,both legs, genitalia and/or groin buttocks, digits and/or nails, was examined.  -Multiple regular brown pigmented macules and papules are identified on the body.  There is a 4 mm biopsy site on the R breast with pigment recurrent at one lateral edge.   There are no erythematous papules but  mild inflammation and scattered telangectasias on the bilateral cheeks and nose.   Scattered brown macules on sun exposed areas.  -No other lesions of concern on areas examined.     Impression/Plan:    1. Rosacea    Will send metrogel daily.    Discussed patient's redness is secondary to telangiectasia. Recommended laser for telangiectasia - discussed treatment with Dr. Burt    2. Multiple clinically benign nevi on the body and Solar lentigines in sun exposed areas    ABCDs of melanoma were discussed and self skin checks were advised.    3. Previously biopsied dysplastic nevus. Path report stated the lesion was consistent with a dysplastic nevus and to monitor for recurrence. This lesion has now recurred.     Previously biopsied     Favor excision with 2mm margin and discussed with patient.    Pt to schedule in surgery clinic          CC González Ferrari on close of this encounter.  Follow-up for excision of dysplastic nevus and yearly for TBSE, sooner prn       Staff Involved:  Staff Only    Lynn Aranda MD, PhD    Dermatology

## 2018-03-27 NOTE — NURSING NOTE
"Dermatology Rooming Note    Lindsay Payne's goals for this visit include:   Chief Complaint   Patient presents with     Skin Check     Skin check,  Lindsay states \" I have lots of moles that I can not see.\" She would alos like to discuss treatment for red marks on her face.     Deann Virk,CHRIS  "

## 2018-03-27 NOTE — MR AVS SNAPSHOT
After Visit Summary   3/27/2018    Lindsay Payne    MRN: [id number]           Patient Information     Date Of Birth          1980        Visit Information        Provider Department      3/27/2018 2:45 PM Lynn Aranda MD [facility] Dermatology         Follow-ups after your visit        Your next 10 appointments already scheduled     May 09, 2018  8:45 AM CDT   (Arrive by 8:30 AM)   NEW COSMETIC with Dinorah Burt MD   [facility] Dermatology ([facility] [place])    [address] [place] [id number] [phone]              Who to contact     Please call your clinic at [phone] to:    Ask questions about your health    Make or cancel appointments    Discuss your medicines    Learn about your test results    Speak to your doctor            Additional Information About Your Visit        MyChart Information     [employer] gives you secure access to your electronic health record. If you see a primary care provider, you can also send messages to your care team and make appointments. If you have questions, please call your primary care clinic.  If you do not have a primary care provider, please call [phone] and they will assist you.      [employer] is an electronic gateway that provides easy, online access to your medical records. With [employer]flux, you can request a clinic appointment, read your test results, renew a prescription or communicate with your care team.     To access your existing account, please contact your [facility] or call [phone] for assistance.        Care EveryWhere ID     This is your Care EveryWhere ID. This could be used by other organizations to access your [place] [facility] medical records  [id number]         Blood Pressure from Last 3 Encounters:   09/05/17 106/69   07/06/17 94/69   05/12/17 100/72    Weight from Last 3 Encounters:   09/05/17 73.9 kg (163 lb)   07/06/17 73.5 kg (162 lb)    05/12/17 72.4 kg (159 lb 11.2 oz)              Today, you had the following     No orders found for display       Primary Care Provider Office Phone # Fax #    González Ko [phone]       [address] [facility] [address]  [facility] [address]        Equal Access to Services     [facility]: Hadii aad ku hadasho Soomaali, waaxda luqadaha, qaybta kaalmada adeegyada, waxay idiin hayaan adeeg kharash la'aan . So [facility] [phone].    ATENCIÓN: Si habla español, tiene a erickson disposición servicios gratuitos de asistencia lingüística. Kayliname al [phone].    We comply with applicable federal civil rights laws and [place] laws. We do not discriminate on the basis of race, color, national origin, age, disability, sex, sexual orientation, or gender identity.            Thank you!     Thank you for choosing [facility] DERMATOLOGY  for your care. Our goal is always to provide you with excellent care. Hearing back from our patients is one way we can continue to improve our services. Please take a few minutes to complete the written survey that you may receive in the mail after your visit with us. Thank you!             Your Updated Medication List - Protect others around you: Learn how to safely use, store and throw away your medicines at www.disposemymeds.org.          This list is accurate as of 3/27/18  3:38 PM.  Always use your most recent med list.                   Brand Name Dispense Instructions for use Diagnosis    albuterol 108 (90 BASE) MCG/ACT Inhaler    PROAIR HFA/PROVENTIL HFA/VENTOLIN HFA    1 Inhaler    Inhale 2 puffs into the lungs every 6 hours as needed for shortness of breath / dyspnea or wheezing    SOB (shortness of breath)       AMOXICILLIN PO           imiquimod 5 % cream    ALDARA    12 packet    Apply a small sized amount to warts or molluscum three times weekly at bedtime.   Wash off after 8 hours.   May use for up to 16 weeks.    Common wart       indomethacin 50 MG capsule     INDOCIN    42 capsule    Take 1 capsule (50 mg) by mouth 3 times daily (with meals) for 14 days    Episcleritis of right eye       olopatadine HCl 0.2 % Soln    PATADAY    1 Bottle    Place 1 drop into both eyes daily    Allergic conjunctivitis, bilateral       omeprazole 20 MG CR capsule    priLOSEC    90 capsule    Take 1 capsule (20 mg) by mouth daily    Gastroesophageal reflux disease, esophagitis presence not specified       Salicylic Acid 27.5 % Liqd     10 mL    Soak wart in warm water for 5 minutes. Dry area thoroughly. Apply to entire wart surface, allow to dry, and then apply a second time. Avoid contact with surrounding skin. Continue therapy once or twice daily. Resolution may be expected after 4 to 6 weeks; some warts may take longer to remove.    Common wart

## 2018-04-10 RX ORDER — METRONIDAZOLE 7.5 MG/G
GEL TOPICAL 2 TIMES DAILY
Qty: 45 G | Refills: 3 | Status: SHIPPED | OUTPATIENT
Start: 2018-04-10 | End: 2019-06-19

## 2018-04-24 ENCOUNTER — OFFICE VISIT (OUTPATIENT)
Dept: DERMATOLOGY | Facility: CLINIC | Age: 38
End: 2018-04-24
Payer: COMMERCIAL

## 2018-04-24 DIAGNOSIS — B07.8 COMMON WART: ICD-10-CM

## 2018-04-24 DIAGNOSIS — D23.5 DYSPLASTIC NEVUS OF TRUNK: Primary | ICD-10-CM

## 2018-04-24 RX ORDER — IMIQUIMOD 12.5 MG/.25G
CREAM TOPICAL
Qty: 12 PACKET | Refills: 3 | Status: SHIPPED | OUTPATIENT
Start: 2018-04-24 | End: 2018-12-05

## 2018-04-24 ASSESSMENT — PAIN SCALES - GENERAL: PAINLEVEL: NO PAIN (0)

## 2018-04-24 NOTE — PATIENT INSTRUCTIONS

## 2018-04-24 NOTE — PROGRESS NOTES
DERMATOLOGY EXCISION PROCEDURE NOTE      NAME OF PROCEDURE: Excision intermediate layered linear closure  Staff surgeon: Lynn Aranda MD, PhD  Resident: n/a  Scrub Nurse: Deann    PRE-OPERATIVE DIAGNOSIS:  Skin, breast, right:       - Nevus pigmentosus, compound, with moderate dysplasia - (see  Description). Recurrent.  POST-OPERATIVE DIAGNOSIS: Same   LOCATION: R lateral breast  FINAL EXCISION SIZE(DEFECT SIZE): 2.2x1.3 cm  MARGIN: 2 cm  FINAL REPAIR LENGTH: 4.0 cm   ANESTHESIA: 1% lidocaine with 1:100,000 epinephrine           INDICATIONS: This patient presented with a 1.7x0.8 cm recurrent DN on the R lateral breast. Excision was indicated. We discussed the principles of treatment and most likely complications including scarring, bleeding, infection, incomplete excision, wound dehiscence, pain, nerve damage, and recurrence. Informed consent was obtained and the patient underwent the procedure as follows:    PROCEDURE: The patient was taken to the operative suite. Time-out was performed.  The treatment area was anesthetized with 1% lidocaine with epinephrine. The area was prepped with Chlorhexidine and rinsed with sterile saline and draped with sterile towels. The lesion was delineated and excised down to subcutaneous fat in a elliptical manner. Hemostasis was obtained by electrocoagulation.     REPAIR: An intermediate layered linear closure was selected as the procedure which would maximally preserve both function and cosmesis.    After the excision of the tumor, the area was carefully  undermined. Hemostasis was obtained with pressure and electrocoagulation.  Closure was oriented so that the wound was in the patient's natural skin tension lines. The subcutaneous and dermal layers were then closed with 4.0 vicryl sutures. The epidermis was then carefully approximated along the length of the wound using 5.0 prolene running subcuticular sutures.     Estimated blood loss was less than 10 ml for all  surgical sites. A sterile pressure dressing was applied and wound care instructions, with a written handout, were given. The patient was discharged from the [facility] alert and ambulatory.    Follow-up in 10-13 days for suture removal.       Anatomic Pathology Results: pending    Clinical Follow-Up: yearly TBSE    Staff Involved:  Staff Only    Lynn Aranda MD, PhD    Dermatology

## 2018-04-24 NOTE — LETTER
4/24/2018       RE: Lindsay Payne  [address] [place] [address]     Dear Colleague,    Thank you for referring your patient, Lindsay Payne, to the [facility] DERMATOLOGY at [facility]. Please see a copy of my visit note below.      DERMATOLOGY EXCISION PROCEDURE NOTE      NAME OF PROCEDURE: Excision intermediate layered linear closure  Staff surgeon: Lynn Aranda MD, PhD  Resident: n/a  Scrub Nurse: Deann    PRE-OPERATIVE DIAGNOSIS:  Skin, breast, right:       - Nevus pigmentosus, compound, with moderate dysplasia - (see  Description). Recurrent.  POST-OPERATIVE DIAGNOSIS: Same   LOCATION: R lateral breast  FINAL EXCISION SIZE(DEFECT SIZE): 2.2x1.3 cm  MARGIN: 2 cm  FINAL REPAIR LENGTH: 4.0 cm   ANESTHESIA: 1% lidocaine with 1:100,000 epinephrine           INDICATIONS: This patient presented with a 1.7x0.8 cm recurrent DN on the R lateral breast. Excision was indicated. We discussed the principles of treatment and most likely complications including scarring, bleeding, infection, incomplete excision, wound dehiscence, pain, nerve damage, and recurrence. Informed consent was obtained and the patient underwent the procedure as follows:    PROCEDURE: The patient was taken to the operative suite. Time-out was performed.  The treatment area was anesthetized with 1% lidocaine with epinephrine. The area was prepped with Chlorhexidine and rinsed with sterile saline and draped with sterile towels. The lesion was delineated and excised down to subcutaneous fat in a elliptical manner. Hemostasis was obtained by electrocoagulation.     REPAIR: An intermediate layered linear closure was selected as the procedure which would maximally preserve both function and cosmesis.    After the excision of the tumor, the area was carefully  undermined. Hemostasis was obtained with pressure and electrocoagulation.  Closure was oriented so that the wound was in the patient's  natural skin tension lines. The subcutaneous and dermal layers were then closed with 4.0 vicryl sutures. The epidermis was then carefully approximated along the length of the wound using 5.0 prolene running subcuticular sutures.     Estimated blood loss was less than 10 ml for all surgical sites. A sterile pressure dressing was applied and wound care instructions, with a written handout, were given. The patient was discharged from the [facility] alert and ambulatory.    Follow-up in 10-13 days for suture removal.       Anatomic Pathology Results: pending    Clinical Follow-Up: yearly TBSE    Staff Involved:  Staff Only    Lynn Aranda MD, PhD    Dermatology

## 2018-04-24 NOTE — MR AVS SNAPSHOT
After Visit Summary   4/24/2018    Lindsay Payne    MRN: [id number]           Patient Information     Date Of Birth          1980        Visit Information        Provider Department      4/24/2018 9:20 AM Lynn Aranda MD [facility] Dermatology        Today's Diagnoses     Dysplastic nevus of trunk    -  1    Common wart          Care Instructions    Excision Wound Care Instructions  I will experience scar, altered skin color, bleeding, swelling, pain, crusting and redness. I may experience altered sensation. Risks are excessive bleeding, infection, muscle weakness, thick (hypertrophic or keloidal) scar, and recurrence,. A second procedure may be recommended to obtain the best cosmetic or functional result.  Possible complications of any surgical procedure are bleeding, infection, scarring, alteration in skin color and sensation, muscle weakness in the area, wound dehiscence or seperation, or recurrence of the lesion or disease. On occasion, after healing, a secondary procedure or revision may be recommended in order to obtain the best cosmetic or functional result.   After your surgery, a pressure bandage will be placed over the area that has sutures. This will help prevent bleeding. Please follow these instructions until you come back to clinic for suture removal on , as they will help you to prevent complications as your wound heals.  For the First 48 hours After Surgery:  1. Leave the pressure bandage on and keep it dry. If it should come loose, you may retape it, but do not take it off.  2. Relax and take it easy. Do not do any vigorous exercise, heavy lifting, or bending forward. This could cause the wound to bleed.  3. Post-operative pain is usually mild. You may take plain or extra strength Tylenol every 4 hours as needed (do not take more than 4,000mg in one day). Do not take any medicine that contains aspirin, ibuprofen or motrin unless you have been recommended these by  a doctor.  Avoid alcohol and vitamin E as these may increase your tendency to bleed.  4. You may put an ice pack around the bandaged area for 20 minutes every 2-3 hours. This may help reduce swelling, bruising, and pain. Make sure the ice pack is waterproof so that the pressure bandage does not get wet.   5. You may see a small amount of drainage or blood on your pressure bandage. This is normal. However, if drainage or bleeding continues or saturates the bandage, you will need to apply firm pressure over the bandage with a washcloth for 15 minutes. If bleeding continues after applying pressure for 15 minutes then go to the nearest emergency room.  48 Hours After Surgery  Carefully remove the bandage and start daily wound care and dressing changes. You may also now shower and get the wound wet. Wash wound with a mild soap and water.  Use caution when washing the wound. Be gentle and do not let the forceful shower stream hit the wound directly.  PAT dry.  Daily Wound Care:  1. Wash wound with a mild soap and water.  Use caution when washing the wound, be gentle and do not let the forceful shower stream hit the wound directly.  2. PAT DRY.  3. Apply Vaseline (from a new container or tube) over the suture line with a Q-tip. It is very important to keep the wound continuously moist, as wounds heal best in a moist environment.  4.  Keep the site covered until sutures are removed, you can cover it with a Telfa (non-stick) dressing and tape or a band-aid.    5. If you are unable to keep wound covered, you must apply Vaseline every 2 - 3 hours (while awake) to ensure it is being kept moist for optimal healing. A dressing overnight is recommended to keep the area moist.   Call Us If:  1. You have pain that is not controlled with Tylenol.  2. You have signs or symptoms of an infection, such as: fever over 100 degrees F, redness, warmth, or foul-smelling or yellow/creamy drainage from the wound.  Who should I call with  questions?    [facility]: [phone]     [facility]: [phone]    For urgent needs outside of business hours call the [facility] at [phone] and ask for the dermatology resident on call              Follow-ups after your visit        Your next 10 appointments already scheduled     May 07, 2018  8:45 AM CDT   Nurse Visit with  Dermatology Nurse   [facility] ([facility])    [address] [address] [phone]            May 09, 2018  8:45 AM CDT   (Arrive by 8:30 AM)   NEW COSMETIC with Dinorah Burt MD   [facility] ([facility])    [address] [address] [phone]              Who to contact     Please call your clinic at [phone] to:    Ask questions about your health    Make or cancel appointments    Discuss your medicines    Learn about your test results    Speak to your doctor            Additional Information About Your Visit        [employer] Information     [employer] gives you secure access to your electronic health record. If you see a primary care provider, you can also send messages to your care team and make appointments. If you have questions, please call your primary care clinic.  If you do not have a primary care provider, please call [phone] and they will assist you.      [employer] is an electronic gateway that provides easy, online access to your medical records. With [employer], you can request a clinic appointment, read your test results, renew a prescription or communicate with your care team.     To access your existing account, please contact your [facility] or call [phone] for assistance.        Care EveryWhere ID     This is your Care EveryWhere ID. This could be used by other organizations to access your [facility]  records  [id number]         Blood Pressure from Last 3 Encounters:   09/05/17 106/69   07/06/17 94/69   05/12/17 100/72    Weight from Last 3 Encounters:   09/05/17 73.9 kg (163 lb)   07/06/17 73.5 kg (162 lb)   05/12/17 72.4 kg (159 lb 11.2 oz)              We Performed the Following     Dermatological path order and indications     EXC BENIGN SKIN LESION TRUNK/ARM/LEG 2.1-3.0 CM     REPAIR INTERMED, WOUND TRUNK/ARM/LEG 2.6-7.5 CM          Where to get your medicines      These medications were sent to [place], [place] - [address]  [address], [facility] [address]     Phone:  [phone]     imiquimod 5 % cream          Primary Care Provider Office Phone # Fax #    Gonz[facility]lez Shorty Ko [phone]       [address] [facility] [address]  [facility] [address]        Equal Access to Services     [facility] AH: Hadii aad ku hadasho Soomaali, waaxda luqadaha, qaybta kaalmada adeegyada, waxay idiin hayluis enriquen leander arnold . So [facility] [phone].    ATENCIÓN: Si habla español, tiene a erickson disposición servicios gratuitos de asistencia lingüística. Llame al [phone].    We comply with applicable federal civil rights laws and [place] laws. We do not discriminate on the basis of race, color, national origin, age, disability, sex, sexual orientation, or gender identity.            Thank you!     Thank you for choosing [facility] DERMATOLOGY  for your care. Our goal is always to provide you with excellent care. Hearing back from our patients is one way we can continue to improve our services. Please take a few minutes to complete the written survey that you may receive in the mail after your visit with us. Thank you!             Your Updated Medication List - Protect others around you: Learn how to safely use, store and throw away your medicines at www.disposemymeds.org.          This list is accurate as of 4/24/18 10:10 AM.  Always use your most recent med list.                    Brand Name Dispense Instructions for use Diagnosis    albuterol 108 (90 Base) MCG/ACT Inhaler    PROAIR HFA/PROVENTIL HFA/VENTOLIN HFA    1 Inhaler    Inhale 2 puffs into the lungs every 6 hours as needed for shortness of breath / dyspnea or wheezing    SOB (shortness of breath)       AMOXICILLIN PO           imiquimod 5 % cream    ALDARA    12 packet    Apply a small sized amount to warts or molluscum three times weekly at bedtime.   Wash off after 8 hours.   May use for up to 16 weeks.    Common wart       indomethacin 50 MG capsule    INDOCIN    42 capsule    Take 1 capsule (50 mg) by mouth 3 times daily (with meals) for 14 days    Episcleritis of right eye       metroNIDAZOLE 0.75 % topical gel    METROGEL    45 g    Apply topically 2 times daily    Acne rosacea       olopatadine HCl 0.2 % Soln    PATADAY    1 Bottle    Place 1 drop into both eyes daily    Allergic conjunctivitis, bilateral       omeprazole 20 MG CR capsule    priLOSEC    90 capsule    Take 1 capsule (20 mg) by mouth daily    Gastroesophageal reflux disease, esophagitis presence not specified       Salicylic Acid 27.5 % Liqd     10 mL    Soak wart in warm water for 5 minutes. Dry area thoroughly. Apply to entire wart surface, allow to dry, and then apply a second time. Avoid contact with surrounding skin. Continue therapy once or twice daily. Resolution may be expected after 4 to 6 weeks; some warts may take longer to remove.    Common wart

## 2018-04-25 ASSESSMENT — PAIN SCALES - GENERAL: PAINLEVEL: NO PAIN (0)

## 2018-04-25 NOTE — NURSING NOTE
Lidocaine-epinephrine 1-1:922684 % injection   9mL once for one use, starting 4/25/2018 ending 4/25/2018, Injected by Deann Virk LPN

## 2018-04-30 LAB — COPATH REPORT: NORMAL

## 2018-05-07 ENCOUNTER — PRE VISIT (OUTPATIENT)
Dept: DERMATOLOGY | Facility: CLINIC | Age: 38
End: 2018-05-07

## 2018-05-07 NOTE — TELEPHONE ENCOUNTER
Dermatology Pre-visit Call:    Reason for visit : Rosacia on the face.     Any personal history of skin cancers: No. Family hx yes    Was the patient referred: Yes    If the patient was referred, are records obtained: Yes. (If no, then obtain records).    Has the patient seen a dermatologist in the past: Yes. (If yes, obtain records)    Patient Reminders Given:  --Please, make sure you bring an updated list of your medications.   --Plan on being in our facility for approximately one hour, this includes the registration process, office visit, education and check-out process.  If you are having a procedure, more time may be required.     --If you are having a procedure, please, present 15 minutes early.  --Location reviewed.   --If you need to cancel or reschedule, call XXXX  --We look forward to seeing you in Dermatology Clinic.       Dermatology Laser Intake Checklist:  History of psoriasis:No  History of recent tan, indoor or outdoor tanning/vacation or other sun exposure: No  History of vitiligo:No  Family history of vitiligo:No  Recent other cosmetic procedure(microderm abrasion/peel/hair removal/facial etc):No  History of HSV:No   Did the patient start valtrex: No  Tattoo in the area to be treated:No  Is patient using hydroquinone:No  Retinoids and other acne medications stopped for 2 weeks:No  Has the patient had accutane in the last 6-12 months:No  Pregnant or breastfeeding: No  History of skin cancer in area planned for treatment: No  History of treatment with gold:No  Changes in medical history: No  Photos obtained: No  Does the patient smoke:No  Is the patient on ibuprofen/aspirin/plavix/coumadin/other blood thinner: No  If patient is taking narcotic or diazepam(valium)-does patient have : No  There were no vitals taken for this visit.

## 2018-05-09 ENCOUNTER — OFFICE VISIT (OUTPATIENT)
Dept: DERMATOLOGY | Facility: CLINIC | Age: 38
End: 2018-05-09
Payer: COMMERCIAL

## 2018-05-09 DIAGNOSIS — I78.1 SPIDER ANGIOMA: ICD-10-CM

## 2018-05-09 DIAGNOSIS — L71.9 ROSACEA: Primary | ICD-10-CM

## 2018-05-09 ASSESSMENT — PAIN SCALES - GENERAL
PAINLEVEL: MILD PAIN (2)
PAINLEVEL: NO PAIN (0)

## 2018-05-09 NOTE — PATIENT INSTRUCTIONS
Pulse Dye Laser    I will experience redness, swelling, pain, and heat sensation. I may experience bruising, itching, or acne. Risks are blistering, oozing, permanent scarring, hair loss,  temporary or permanent skin lightening or darkening, infection, and eye injury. I understand my outcome could be no improvement, slight improvement. Multiple treatments may be required.    After treatment, Do Not:    Rub, scratch, or put weight on the site for 2 weeks    Wear tight fitting clothing or jewelry over the site    Brown. Keep the site out of sunlight. Use sunscreen of 30 SPF or greater when in the sun. Use sunscreen 30 minutes before going out and reapply if sweating. Tanning decreases the success of the treatment     How do I care for the treated site?    Use ice packs for 10 minutes after the procedure for swelling     If the site is on your face, use ice again 1 hour after treatment    If a scab or crust forms, gently cleanse the site with hydrogen peroxide. Then put on Vaseline  ointment 3 times a day    Do not use makeup on any open wound    What should I expect?    Blue-gray color that may take 2 to 3 weeks to go away    Redness may also last a week or longer    Results may take up to 3 or 4 months after treatment    More procedures may be needed    Who should I call with questions?    [facility]: [phone]     Health system: [phone]    For urgent needs outside of business hours call the [facility] at [phone] and ask for the dermatology resident on call

## 2018-05-09 NOTE — PROCEDURES
Laser- VBeam(Pulsed Dye Laser) Procedure Note: Cosmetic    Procedure Date: May 9, 2018    Attending Staff Surgeon: Dr. Dinorah Burt     Resident Surgeon: none    Assistant: Kinjal Rich LPN     Operating Room Data:     Surgery/Procedure Date:    SAME     Pre-operative Diagnosis:   Rosacea and spider angioma  Location: cheeks and nose     Operation/Procedure    Vbeam pulsed dye laser treatment#: 1       Post-operative Diagnosis:  SAME    Laser Settings:  Nose   Energy: 8.5 J/cm2  Spot size:7mm  Pulse width:  1.5 mS (0.45 thru 40 mS)  Dynamic cooling spray settin mS  Dynamic cooling device delay:  20 mS    Laser Settings:  Energy: 7 J/cm2  Spot size:7mm  Pulse width:  6 mS (0.45 thru 40 mS)  Dynamic cooling spray settin mS  Dynamic cooling device delay:  20 mS    Anesthesia:  None    Description of Operation/Procedure:   The nature and purpose of the procedure, associated risks, possible consequences, complications and alternative methods of treatment were explained in detail, this includes but is not limited to hyperpigmentation, hypopigmentation, scarring, bruising, hair loss pain/discomfort, eye injury, and blister.   We reviewed that the outcome could be any of the following: no improvement, slight improvement or change in skin color & texture, the skin might be permanently lighter or darker, and though uncommon, superficial scarring may occur.  Multiple treatments may be recommended.   A photo and operative consent were obtained. Time-out was performed.  The patient was positioned to optimally expose the area treated.  Protective eyewear was worn by the patient and goggles on all personnel in the treatment room.  The patient confirmed the site to be treated. The laser energy output was verified by meter reading.      The clinically evident lesion(s) was/were treated with Shara Vbeam pulsed dye laser (595 nm) beam as above.  A total of 259 pulses were used.  The patient tolerated the procedure well  and no complications were noted. Post operative instructions were provided. The total laser operation and preparation time was 10 minutes.      The patient will follow-up in 4-6 weeks .    The patient will pay the cosmetic fee today.       Staff Involved:  Scribe/Staff      Scribe Disclosure:   I, Shanda Palacios, am serving as a scribe to document services personally performed by Dr. Dinorah Burt, based on data collection and the provider's statements to me.       Provider Disclosure:   The documentation recorded by the scribe accurately reflects the services I personally performed and the decisions made by me.    Dinorah Burt MD    Department of Dermatology  [facility]: Phone: [phone], Fax:[phone]  [facility]: Phone: [phone], Fax: [phone]

## 2018-05-09 NOTE — LETTER
5/9/2018       RE: Lindsay Payne  [address] [place] [address]     Dear Colleague,    Thank you for referring your patient, Lindsay Payne, to the [facility] DERMATOLOGY at [facility]. Please see a copy of my visit note below.    [facility] Dermatology Note      Dermatology Problem List:  1. Family history of melanoma   2. History of DN, right breast, 4/2014  -right breast, Residual melanocytic nevus s/p re excision 4/24/2018   3. Rosacea: metrogel, recommend laser for telangiectasia  4. History of nevus excision left breast in [facility], non-dysplastic per patient    CC:   Chief Complaint   Patient presents with     Derm Problem     Lindsay would like to discuss PDL for rosacea.         Encounter Date: May 9, 2018    History of Present Illness:  Ms. Lindsay Payne is a 37 year old female who presents to dermatology for PDL consult. The patient was last seen for excision of a residual melanocytic nevus. The patient reports she is concerned about the red spots on the face and by to the spider angioma on the left nasal bridge. The patient reports that she uses SPF 30 Neutrogena.      Past Medical History:   Patient Active Problem List   Diagnosis     CARDIOVASCULAR SCREENING; LDL GOAL LESS THAN 160     Paraguard intrauterine device     Gastroesophageal reflux disease, esophagitis presence not specified     Past Medical History:   Diagnosis Date     NO ACTIVE PROBLEMS      Past Surgical History:   Procedure Laterality Date     APPENDECTOMY  1994       Social History:  The patient denies use of tanning beds. Originally from the [facility]    Family History:  + fam history of melanoma in a first degree relative.    Medications:  Current Outpatient Prescriptions   Medication Sig Dispense Refill     albuterol (PROAIR HFA/PROVENTIL HFA/VENTOLIN HFA) 108 (90 BASE) MCG/ACT Inhaler Inhale 2 puffs into the lungs every 6 hours as needed for shortness of breath /  dyspnea or wheezing 1 Inhaler 0     AMOXICILLIN PO        imiquimod (ALDARA) 5 % cream Apply a small sized amount to warts or molluscum three times weekly at bedtime.   Wash off after 8 hours.   May use for up to 16 weeks. 12 packet 3     indomethacin (INDOCIN) 50 MG capsule Take 1 capsule (50 mg) by mouth 3 times daily (with meals) for 14 days 42 capsule 0     metroNIDAZOLE (METROGEL) 0.75 % topical gel Apply topically 2 times daily 45 g 3     olopatadine HCl (PATADAY) 0.2 % SOLN Place 1 drop into both eyes daily (Patient not taking: Reported on 4/24/2018) 1 Bottle 3     omeprazole (PRILOSEC) 20 MG CR capsule Take 1 capsule (20 mg) by mouth daily 90 capsule 1     Salicylic Acid 27.5 % LIQD Soak wart in warm water for 5 minutes. Dry area thoroughly. Apply to entire wart surface, allow to dry, and then apply a second time. Avoid contact with surrounding skin. Continue therapy once or twice daily. Resolution may be expected after 4 to 6 weeks; some warts may take longer to remove. (Patient not taking: Reported on 3/27/2018) 10 mL 1     Allergies   Allergen Reactions     Pnv [Prenatal Vit W-Ey-[id number]-Fa] Itching and Rash     Review of Systems:  -As per HPI  -Constitutional: The patient is feeling generally well.   -Skin: As above in HPI. No additional skin concerns.    Physical exam:  Vitals: There were no vitals taken for this visit.  GEN: This is a well developed, well-nourished female in no acute distress, in a pleasant mood.    SKIN: Focused examination of the face was performed.  -Telangiectasias on the cheeks and nose.   -Spider left nasal bridge   -No other lesions of concern on areas examined.     Impression/Plan:  1. Rosacea, cheeks and nose.   Discussed PDL treatment. Discussed need for multiple treatments, risks and cost. Discussed that this considered a cosmetic procedure and is generally an out of pocket expense. Discussed risk of bruising (common) and blistering/scarring (rare). Discussed the  importance of daily sun protection.   Discussed mirvaso as a daily treatment.  Discussed rhofade as daily treatment.   Start SPF 50 sunscreen   Ocular rosacea: Symptoms of this condition include: bloodshot and watery eyes, eyes that feel dry, irritated, or gritty, burning, stinging in the eyes, blurred vision, light sensitivity.   See procedure note: PDL   Discussed that she cannot be tan before her next visit.   Declines topciasl    2. Spider angioma    See procedure note: PDL     Follow-up 4-6 weeks, earlier for new or changing lesions.       Staff Involved:  Staff/Scribe    Scribe Disclosure:   I, Shanda Palacios, am serving as a scribe to document services personally performed by Dr. Dinorah Burt, based on data collection and the provider's statements to me.       Provider Disclosure:   The documentation recorded by the scribe accurately reflects the services I personally performed and the decisions made by me.    Dinorah Burt MD    Department of Dermatology  [facility]: Phone: [phone], Fax:[phone]  [facility]: Phone: [phone], Fax: [phone]

## 2018-05-09 NOTE — PROGRESS NOTES
[facility] Dermatology Note      Dermatology Problem List:  1. Family history of melanoma   2. History of DN, right breast, 4/2014  -right breast, Residual melanocytic nevus s/p re excision 4/24/2018   3. Rosacea: metrogel, recommend laser for telangiectasia  4. History of nevus excision left breast in [facility], non-dysplastic per patient    CC:   Chief Complaint   Patient presents with     Derm Problem     Lindsay would like to discuss PDL for rosacea.         Encounter Date: May 9, 2018    History of Present Illness:  Ms. Lindsay Payne is a 37 year old female who presents to dermatology for PDL consult. The patient was last seen for excision of a residual melanocytic nevus. The patient reports she is concerned about the red spots on the face and by to the spider angioma on the left nasal bridge. The patient reports that she uses SPF 30 Neutrogena.      Past Medical History:   Patient Active Problem List   Diagnosis     CARDIOVASCULAR SCREENING; LDL GOAL LESS THAN 160     Paraguard intrauterine device     Gastroesophageal reflux disease, esophagitis presence not specified     Past Medical History:   Diagnosis Date     NO ACTIVE PROBLEMS      Past Surgical History:   Procedure Laterality Date     APPENDECTOMY  1994       Social History:  The patient denies use of tanning beds. Originally from the [facility]    Family History:  + fam history of melanoma in a first degree relative.    Medications:  Current Outpatient Prescriptions   Medication Sig Dispense Refill     albuterol (PROAIR HFA/PROVENTIL HFA/VENTOLIN HFA) 108 (90 BASE) MCG/ACT Inhaler Inhale 2 puffs into the lungs every 6 hours as needed for shortness of breath / dyspnea or wheezing 1 Inhaler 0     AMOXICILLIN PO        imiquimod (ALDARA) 5 % cream Apply a small sized amount to warts or molluscum three times weekly at bedtime.   Wash off after 8 hours.   May use for up to 16 weeks. 12 packet 3     indomethacin (INDOCIN) 50 MG capsule Take  1 capsule (50 mg) by mouth 3 times daily (with meals) for 14 days 42 capsule 0     metroNIDAZOLE (METROGEL) 0.75 % topical gel Apply topically 2 times daily 45 g 3     olopatadine HCl (PATADAY) 0.2 % SOLN Place 1 drop into both eyes daily (Patient not taking: Reported on 4/24/2018) 1 Bottle 3     omeprazole (PRILOSEC) 20 MG CR capsule Take 1 capsule (20 mg) by mouth daily 90 capsule 1     Salicylic Acid 27.5 % LIQD Soak wart in warm water for 5 minutes. Dry area thoroughly. Apply to entire wart surface, allow to dry, and then apply a second time. Avoid contact with surrounding skin. Continue therapy once or twice daily. Resolution may be expected after 4 to 6 weeks; some warts may take longer to remove. (Patient not taking: Reported on 3/27/2018) 10 mL 1     Allergies   Allergen Reactions     Pnv [Prenatal Vit R-Te-[id number]-Fa] Itching and Rash         Review of Systems:  -As per HPI  -Constitutional: The patient is feeling generally well.   -Skin: As above in HPI. No additional skin concerns.    Physical exam:  Vitals: There were no vitals taken for this visit.  GEN: This is a well developed, well-nourished female in no acute distress, in a pleasant mood.    SKIN: Focused examination of the face was performed.  -Telangiectasias on the cheeks and nose.   -Spider left nasal bridge   -No other lesions of concern on areas examined.     Impression/Plan:  1. Rosacea, cheeks and nose.   Discussed PDL treatment. Discussed need for multiple treatments, risks and cost. Discussed that this considered a cosmetic procedure and is generally an out of pocket expense. Discussed risk of bruising (common) and blistering/scarring (rare). Discussed the importance of daily sun protection.   Discussed mirvaso as a daily treatment.  Discussed rhofade as daily treatment.   Start SPF 50 sunscreen   Ocular rosacea: Symptoms of this condition include: bloodshot and watery eyes, eyes that feel dry, irritated, or gritty, burning, stinging in  the eyes, blurred vision, light sensitivity.   See procedure note: PDL   Discussed that she cannot be tan before her next visit.   Declines topciasl    2. Spider angioma    See procedure note: PDL       Follow-up 4-6 weeks, earlier for new or changing lesions.       Staff Involved:  Staff/Scribe    Scribe Disclosure:   I, Shanda Suzanne, am serving as a scribe to document services personally performed by Dr. Dinorah Burt, based on data collection and the provider's statements to me.       Provider Disclosure:   The documentation recorded by the scribe accurately reflects the services I personally performed and the decisions made by me.    Dinorah Burt MD    Department of Dermatology  [facility]: Phone: [phone], Fax:[phone]  [facility]: Phone: [phone], Fax: [phone]

## 2018-05-09 NOTE — NURSING NOTE
Dermatology Laser Intake Checklist:  History of psoriasis:No  History of recent tan, indoor or outdoor tanning/vacation or other sun exposure: No  History of vitiligo:No  Family history of vitiligo:No  Recent other cosmetic procedure(microderm abrasion/peel/hair removal/facial etc):No  History of HSV:Yes   Did the patient start valtrex: No  Tattoo in the area to be treated:No  Is patient using hydroquinone:No  Retinoids and other acne medications stopped for 2 weeks:No  Has the patient had accutane in the last 6-12 months:No  Pregnant or breastfeeding: No  History of skin cancer in area planned for treatment: No  History of treatment with gold:Does not know  Changes in medical history: No  Photos obtained: Yes  Does the patient smoke:No  Is the patient on ibuprofen/aspirin/plavix/coumadin/other blood thinner: No  If patient is taking narcotic or diazepam(valium)-does patient have : No  There were no vitals taken for this visit.

## 2018-05-09 NOTE — MR AVS SNAPSHOT
After Visit Summary   5/9/2018    Lindsay Payne    MRN: [id number]           Patient Information     Date Of Birth          1980        Visit Information        Provider Department      5/9/2018 8:45 AM Dinorah Burt MD [facility] Dermatology        Today's Diagnoses     Rosacea    -  1    Spider angioma          Care Instructions    Pulse Dye Laser    I will experience redness, swelling, pain, and heat sensation. I may experience bruising, itching, or acne. Risks are blistering, oozing, permanent scarring, hair loss,  temporary or permanent skin lightening or darkening, infection, and eye injury. I understand my outcome could be no improvement, slight improvement. Multiple treatments may be required.    After treatment, Do Not:    Rub, scratch, or put weight on the site for 2 weeks    Wear tight fitting clothing or jewelry over the site    Brown. Keep the site out of sunlight. Use sunscreen of 30 SPF or greater when in the sun. Use sunscreen 30 minutes before going out and reapply if sweating. Tanning decreases the success of the treatment     How do I care for the treated site?    Use ice packs for 10 minutes after the procedure for swelling     If the site is on your face, use ice again 1 hour after treatment    If a scab or crust forms, gently cleanse the site with hydrogen peroxide. Then put on Vaseline  ointment 3 times a day    Do not use makeup on any open wound    What should I expect?    Blue-gray color that may take 2 to 3 weeks to go away    Redness may also last a week or longer    Results may take up to 3 or 4 months after treatment    More procedures may be needed    Who should I call with questions?    [facility]: [phone]     [facility]: [phone]    For urgent needs outside of business hours call the [facility] at [phone] and ask for the dermatology resident on call                Follow-ups after  your visit        Follow-up notes from your care team     Return in about 6 weeks (around 6/20/2018).      Your next 10 appointments already scheduled     Jun 20, 2018 12:15 PM CDT   (Arrive by 12:00 PM)   NEW COSMETIC with Dinorah Burt MD   [facility] Dermat[facility] ([facility])    [address] [address] [phone]              Who to contact     Please call your clinic at [phone] to:    Ask questions about your health    Make or cancel appointments    Discuss your medicines    Learn about your test results    Speak to your doctor            Additional Information About Your Visit        [email or web address] Information     [employer] gives you secure access to your electronic health record. If you see a primary care provider, you can also send messages to your care team and make appointments. If you have questions, please call your primary care clinic.  If you do not have a primary care provider, please call [phone] and they will assist you.      [employer] is an electronic gateway that provides easy, online access to your medical records. With [employer], you can request a clinic appointment, read your test results, renew a prescription or communicate with your care team.     To access your existing account, please contact your [facility] or call [phone] for assistance.        Care EveryWhere ID     This is your Care EveryWhere ID. This could be used by other organizations to access your [place] medical records  [id number]         Blood Pressure from Last 3 Encounters:   09/05/17 106/69   07/06/17 94/69   05/12/17 100/72    Weight from Last 3 Encounters:   09/05/17 73.9 kg (163 lb)   07/06/17 73.5 kg (162 lb)   05/12/17 72.4 kg (159 lb 11.2 oz)              We Performed the Following     C PULSE DYE LASER, 3 AREAS        Primary Care Provider Office Phone # Fax #    González Ko [phone]        [address] [facility] [address]  [facility] [address]        Equal Access to Services     PEDRO MEAS : Hadii raymond ku sahil Soanumali, waaxda luqadaha, qaamirata kavishnuda leanderdeandrekatlin, gladys rosenthal vivianajacob vivarveronicamaximilian novak. So [facility] [phone].    ATENCIÓN: Si habla español, tiene a erickson disposición servicios gratuitos de asistencia lingüística. Llame al [phone].    We comply with applicable federal civil rights laws and [place] laws. We do not discriminate on the basis of race, color, national origin, age, disability, sex, sexual orientation, or gender identity.            Thank you!     Thank you for choosing [facility] DERMATOLOGY  for your care. Our goal is always to provide you with excellent care. Hearing back from our patients is one way we can continue to improve our services. Please take a few minutes to complete the written survey that you may receive in the mail after your visit with us. Thank you!             Your Updated Medication List - Protect others around you: Learn how to safely use, store and throw away your medicines at www.disposemymeds.org.          This list is accurate as of 5/9/18  9:33 AM.  Always use your most recent med list.                   Brand Name Dispense Instructions for use Diagnosis    albuterol 108 (90 Base) MCG/ACT Inhaler    PROAIR HFA/PROVENTIL HFA/VENTOLIN HFA    1 Inhaler    Inhale 2 puffs into the lungs every 6 hours as needed for shortness of breath / dyspnea or wheezing    SOB (shortness of breath)       AMOXICILLIN PO           imiquimod 5 % cream    ALDARA    12 packet    Apply a small sized amount to warts or molluscum three times weekly at bedtime.   Wash off after 8 hours.   May use for up to 16 weeks.    Common wart       indomethacin 50 MG capsule    INDOCIN    42 capsule    Take 1 capsule (50 mg) by mouth 3 times daily (with meals) for 14 days    Episcleritis of right eye       metroNIDAZOLE 0.75 % topical gel    METROGEL    45 g    Apply topically 2 times daily     Acne rosacea       olopatadine HCl 0.2 % Soln    PATADAY    1 Bottle    Place 1 drop into both eyes daily    Allergic conjunctivitis, bilateral       omeprazole 20 MG CR capsule    priLOSEC    90 capsule    Take 1 capsule (20 mg) by mouth daily    Gastroesophageal reflux disease, esophagitis presence not specified       Salicylic Acid 27.5 % Liqd     10 mL    Soak wart in warm water for 5 minutes. Dry area thoroughly. Apply to entire wart surface, allow to dry, and then apply a second time. Avoid contact with surrounding skin. Continue therapy once or twice daily. Resolution may be expected after 4 to 6 weeks; some warts may take longer to remove.    Common wart

## 2018-05-09 NOTE — NURSING NOTE
Dermatology Rooming Note    Lindsay Payne's goals for this visit include:   Chief Complaint   Patient presents with     Derm Problem     Lindsay would like to discuss PDL for rosacea.     Deann Virk LPN

## 2018-06-11 ENCOUNTER — DOCUMENTATION ONLY (OUTPATIENT)
Dept: DERMATOLOGY | Facility: CLINIC | Age: 38
End: 2018-06-11

## 2018-07-26 ENCOUNTER — OFFICE VISIT (OUTPATIENT)
Dept: FAMILY MEDICINE | Facility: CLINIC | Age: 38
End: 2018-07-26
Payer: COMMERCIAL

## 2018-07-26 VITALS
HEIGHT: 67 IN | DIASTOLIC BLOOD PRESSURE: 71 MMHG | OXYGEN SATURATION: 99 % | TEMPERATURE: 98.2 F | WEIGHT: 162.2 LBS | HEART RATE: 85 BPM | SYSTOLIC BLOOD PRESSURE: 117 MMHG | BODY MASS INDEX: 25.46 KG/M2

## 2018-07-26 DIAGNOSIS — Z00.00 ROUTINE GENERAL MEDICAL EXAMINATION AT A HEALTH CARE FACILITY: Primary | ICD-10-CM

## 2018-07-26 DIAGNOSIS — K59.01 SLOW TRANSIT CONSTIPATION: ICD-10-CM

## 2018-07-26 DIAGNOSIS — K13.0 ANGULAR CHEILITIS: ICD-10-CM

## 2018-07-26 LAB
ALBUMIN SERPL-MCNC: 4.2 G/DL (ref 3.4–5)
ALP SERPL-CCNC: 71 U/L (ref 40–150)
ALT SERPL W P-5'-P-CCNC: 24 U/L (ref 0–50)
ANION GAP SERPL CALCULATED.3IONS-SCNC: 4 MMOL/L (ref 3–14)
AST SERPL W P-5'-P-CCNC: 19 U/L (ref 0–45)
BILIRUB SERPL-MCNC: 0.5 MG/DL (ref 0.2–1.3)
BUN SERPL-MCNC: 17 MG/DL (ref 7–30)
CALCIUM SERPL-MCNC: 8.6 MG/DL (ref 8.5–10.1)
CHLORIDE SERPL-SCNC: 107 MMOL/L (ref 94–109)
CO2 SERPL-SCNC: 27 MMOL/L (ref 20–32)
CREAT SERPL-MCNC: 0.72 MG/DL (ref 0.52–1.04)
GFR SERPL CREATININE-BSD FRML MDRD: >90 ML/MIN/1.7M2
GLUCOSE SERPL-MCNC: 77 MG/DL (ref 70–99)
POTASSIUM SERPL-SCNC: 4.4 MMOL/L (ref 3.4–5.3)
PROT SERPL-MCNC: 7.7 G/DL (ref 6.8–8.8)
SODIUM SERPL-SCNC: 138 MMOL/L (ref 133–144)
TSH SERPL DL<=0.005 MIU/L-ACNC: 1.05 MU/L (ref 0.4–4)

## 2018-07-26 PROCEDURE — 80053 COMPREHEN METABOLIC PANEL: CPT | Performed by: FAMILY MEDICINE

## 2018-07-26 PROCEDURE — 84443 ASSAY THYROID STIM HORMONE: CPT | Performed by: FAMILY MEDICINE

## 2018-07-26 PROCEDURE — 99395 PREV VISIT EST AGE 18-39: CPT | Performed by: FAMILY MEDICINE

## 2018-07-26 PROCEDURE — 36415 COLL VENOUS BLD VENIPUNCTURE: CPT | Performed by: FAMILY MEDICINE

## 2018-07-26 RX ORDER — DIAPER,BRIEF,INFANT-TODD,DISP
EACH MISCELLANEOUS
Qty: 30 G | Refills: 0 | Status: SHIPPED | OUTPATIENT
Start: 2018-07-26 | End: 2018-12-05

## 2018-07-26 RX ORDER — POLYETHYLENE GLYCOL 3350 17 G/17G
1 POWDER, FOR SOLUTION ORAL DAILY
Qty: 510 G | Refills: 1 | Status: SHIPPED | OUTPATIENT
Start: 2018-07-26 | End: 2019-11-05

## 2018-07-26 RX ORDER — CLOTRIMAZOLE 1 %
CREAM (GRAM) TOPICAL 2 TIMES DAILY
Qty: 15 G | Refills: 1 | Status: SHIPPED | OUTPATIENT
Start: 2018-07-26 | End: 2018-12-05

## 2018-07-26 NOTE — MR AVS SNAPSHOT
After Visit Summary   7/26/2018    Lindsay Payne    MRN: [id number]           Patient Information     Date Of Birth          1980        Visit Information        Provider Department      7/26/2018 9:00 AM González Ko MD [facility]        Today's Diagnoses     Routine general medical examination at a health care facility    -  1    Slow transit constipation        Angular cheilitis          Care Instructions      Preventive Health Recommendations  Female Ages 26 - 39  Yearly exam:   See your health care provider every year in order to    Review health changes.     Discuss preventive care.      Review your medicines if you your doctor has prescribed any.    Until age 30: Get a Pap test every three years (more often if you have had an abnormal result).    After age 30: Talk to your doctor about whether you should have a Pap test every 3 years or have a Pap test with HPV screening every 5 years.   You do not need a Pap test if your uterus was removed (hysterectomy) and you have not had cancer.  You should be tested each year for STDs (sexually transmitted diseases), if you're at risk.   Talk to your provider about how often to have your cholesterol checked.  If you are at risk for diabetes, you should have a diabetes test (fasting glucose).  Shots: Get a flu shot each year. Get a tetanus shot every 10 years.   Nutrition:     Eat at least 5 servings of fruits and vegetables each day.    Eat whole-grain bread, whole-wheat pasta and brown rice instead of white grains and rice.    Get adequate Calcium and Vitamin D.     Lifestyle    Exercise at least 150 minutes a week (30 minutes a day, 5 days of the week). This will help you control your weight and prevent disease.    Limit alcohol to one drink per day.    No smoking.     Wear sunscreen to prevent skin cancer.    See your dentist every six months for an exam and cleaning.            Follow-ups after your visit        Who  "to contact     If you have questions or need follow up information about today's clinic visit or your schedule please contact [facility] directly at [phone].  Normal or non-critical lab and imaging results will be communicated to you by [employer], letter or phone within 4 business days after the clinic has received the results. If you do not hear from us within 7 days, please contact the clinic through [employer] or phone. If you have a critical or abnormal lab result, we will notify you by phone as soon as possible.  Submit refill requests through [employer] or call your pharmacy and they will forward the refill request to us. Please allow 3 business days for your refill to be completed.          Additional Information About Your Visit        [employer] Information     [employer] gives you secure access to your electronic health record. If you see a primary care provider, you can also send messages to your care team and make appointments. If you have questions, please call your primary care clinic.  If you do not have a primary care provider, please call [phone] and they will assist you.        Care EveryWhere ID     This is your Care EveryWhere ID. This could be used by other organizations to access your [place] medical records  [id number]        Your Vitals Were     Pulse Temperature Height Pulse Oximetry BMI (Body Mass Index)       85 98.2  F (36.8  C) (Oral) 5' 6.8\" (1.697 m) 99% 25.56 kg/m2        Blood Pressure from Last 3 Encounters:   07/26/18 117/71   09/05/17 106/69   07/06/17 94/69    Weight from Last 3 Encounters:   07/26/18 162 lb 3.2 oz (73.6 kg)   09/05/17 163 lb (73.9 kg)   07/06/17 162 lb (73.5 kg)              We Performed the Following     Comprehensive metabolic panel     TSH with free T4 reflex          Today's Medication Changes          These changes are accurate as of 7/26/18 10:36 AM.  If you have any questions, ask your nurse or doctor.               Start taking these " medicines.        Dose/Directions    clotrimazole 1 % cream   Commonly known as:  LOTRIMIN   Used for:  Angular cheilitis   Started by:  González Ko MD        Apply topically 2 times daily   Quantity:  15 g   Refills:  1       hydrocortisone 1 % ointment   Used for:  Angular cheilitis   Started by:  González Ko MD        Apply sparingly to affected area three times daily for 14 days.   Quantity:  30 g   Refills:  0       polyethylene glycol powder   Commonly known as:  MIRALAX   Used for:  Slow transit constipation   Started by:  González Ko MD        Dose:  1 capful   Take 17 g (1 capful) by mouth daily   Quantity:  510 g   Refills:  1         Stop taking these medicines if you haven't already. Please contact your care team if you have questions.     albuterol 108 (90 Base) MCG/ACT Inhaler   Commonly known as:  PROAIR HFA/PROVENTIL HFA/VENTOLIN HFA   Stopped by:  González Ko MD           AMOXICILLIN PO   Stopped by:  González Ko MD           olopatadine HCl 0.2 % Soln   Commonly known as:  PATADAY   Stopped by:  González Ko MD                Where to get your medicines      These medications were sent to [place], [place] - [address] [facility]  [address] [facility] [address]     Phone:  [phone]     polyethylene glycol powder         Some of these will need a paper prescription and others can be bought over the counter.  Ask your nurse if you have questions.     Bring a paper prescription for each of these medications     clotrimazole 1 % cream    hydrocortisone 1 % ointment                Primary Care Provider Office Phone # Fax #    González Ko [phone]       [address] [facility] [address]  [facility] [address]        Equal Access to Services     PEDRO MESA AH: Emily Dent, kim licona, gladys chaves  lasridhar novak. So [facility] [place] [facility] [phone].    ATENCIÓN: Si habla yaz, tiene a erickson disposición servicios gratuitos de asistencia lingüística. Micheal wahl [phone].    We comply with applicable federal civil rights laws and [place] laws. We do not discriminate on the basis of race, color, national origin, age, disability, sex, sexual orientation, or gender identity.            Thank you!     Thank you for choosing [facility]  for your care. Our goal is always to provide you with excellent care. Hearing back from our patients is one way we can continue to improve our services. Please take a few minutes to complete the written survey that you may receive in the mail after your visit with us. Thank you!             Your Updated Medication List - Protect others around you: Learn how to safely use, store and throw away your medicines at www.disposemymeds.org.          This list is accurate as of 7/26/18 10:36 AM.  Always use your most recent med list.                   Brand Name Dispense Instructions for use Diagnosis    clotrimazole 1 % cream    LOTRIMIN    15 g    Apply topically 2 times daily    Angular cheilitis       hydrocortisone 1 % ointment     30 g    Apply sparingly to affected area three times daily for 14 days.    Angular cheilitis       imiquimod 5 % cream    ALDARA    12 packet    Apply a small sized amount to warts or molluscum three times weekly at bedtime.   Wash off after 8 hours.   May use for up to 16 weeks.    Common wart       indomethacin 50 MG capsule    INDOCIN    42 capsule    Take 1 capsule (50 mg) by mouth 3 times daily (with meals) for 14 days    Episcleritis of right eye       metroNIDAZOLE 0.75 % topical gel    METROGEL    45 g    Apply topically 2 times daily    Acne rosacea       omeprazole 20 MG CR capsule    priLOSEC    90 capsule    Take 1 capsule (20 mg) by mouth daily    Gastroesophageal reflux disease, esophagitis presence not specified       polyethylene glycol powder     MIRALAX    510 g    Take 17 g (1 capful) by mouth daily    Slow transit constipation       Salicylic Acid 27.5 % Liqd     10 mL    Soak wart in warm water for 5 minutes. Dry area thoroughly. Apply to entire wart surface, allow to dry, and then apply a second time. Avoid contact with surrounding skin. Continue therapy once or twice daily. Resolution may be expected after 4 to 6 weeks; some warts may take longer to remove.    Common wart

## 2018-07-26 NOTE — PROGRESS NOTES
SUBJECTIVE:   CC: Lindsay Payne is an 37 year old woman who presents for preventive health visit.     Healthy Habits:    Do you get at least three servings of calcium containing foods daily (dairy, green leafy vegetables, etc.)? yes    Amount of exercise or daily activities, outside of work: 3-4 day(s) per week    Problems taking medications regularly No    Medication side effects: No    Have you had an eye exam in the past two years? yes    Do you see a dentist twice per year? yes    Do you have sleep apnea, excessive snoring or daytime drowsiness? Yes- daytime drowsiness       Encounter Diagnoses   Name Primary?     Routine general medical examination at a health care facility Yes     Slow transit constipation, ongoing isues, no blood in stool      Angular cheilitis, rash along lips, worse now         Today's PHQ-2 Score:   PHQ-2 ( 1999 [employer]) 7/26/2018 5/12/2017   Q1: Little interest or pleasure in doing things 0 0   Q2: Feeling down, depressed or hopeless 0 -   PHQ-2 Score 0 -       Abuse: Current or Past(Physical, Sexual or Emotional)- No  Do you feel safe in your environment - Yes    Social History   Substance Use Topics     Smoking status: Never Smoker     Smokeless tobacco: Never Used     Alcohol use Yes      Comment: 1-2 drinks per month     If you drink alcohol do you typically have >3 drinks per day or >7 drinks per week? No                     Reviewed orders with patient.  Reviewed health maintenance and updated orders accordingly - Yes  Labs reviewed in EPIC    Mammogram not appropriate for this patient based on age.    Pertinent mammograms are reviewed under the imaging tab.  History of abnormal Pap smear: NO - age 30-65 PAP every 5 years with negative HPV co-testing recommended  PAP / HPV Latest Ref Rng & Units 7/6/2017 8/14/2013 3/13/2012   PAP - NIL NIL NIL   HPV 16 DNA NEG Negative - -   HPV 18 DNA NEG Negative - -   OTHER HR HPV NEG Negative - -     Reviewed and updated as needed this visit  "by clinical staff  Tobacco  Allergies  Meds  Med Hx  Surg Hx  Fam Hx  Soc Hx        Reviewed and updated as needed this visit by Provider        Past Medical History:   Diagnosis Date     NO ACTIVE PROBLEMS         ROS:  CONSTITUTIONAL: NEGATIVE for fever, chills, change in weight  EYES: NEGATIVE for vision changes or irritation  ENT: NEGATIVE for ear, mouth and throat problems  RESP: NEGATIVE for significant cough or SOB  BREAST: NEGATIVE for masses, tenderness or discharge  CV: NEGATIVE for chest pain, palpitations or peripheral edema  GI: NEGATIVE for nausea, abdominal pain, heartburn, or change in bowel habits  : NEGATIVE for unusual urinary or vaginal symptoms. Periods are regular.  MUSCULOSKELETAL: NEGATIVE for significant arthralgias or myalgia  NEURO: NEGATIVE for weakness, dizziness or paresthesias  PSYCHIATRIC: NEGATIVE for changes in mood or affect    OBJECTIVE:   /71 (BP Location: Left arm, Patient Position: Sitting, Cuff Size: Adult Regular)  Pulse 85  Temp 98.2  F (36.8  C) (Oral)  Ht 5' 6.8\" (1.697 m)  Wt 162 lb 3.2 oz (73.6 kg)  SpO2 99%  BMI 25.56 kg/m2  EXAM:  GENERAL: healthy, alert and no distress  EYES: Eyes grossly normal to inspection, PERRL and conjunctivae and sclerae normal  HENT: ear canals and TM's normal, nose and mouth without ulcers or lesions  NECK: no adenopathy, no asymmetry, masses, or scars and thyroid normal to palpation  RESP: lungs clear to auscultation - no rales, rhonchi or wheezes  CV: regular rate and rhythm, normal S1 S2, no S3 or S4, no murmur, click or rub, no peripheral edema and peripheral pulses strong  ABDOMEN: soft, nontender, no hepatosplenomegaly, no masses and bowel sounds normal  MS: no gross musculoskeletal defects noted, no edema  SKIN: erythematoux slightly scaly patches along lips/ angle of mouth   NEURO: Normal strength and tone, mentation intact and speech normal  LYMPH: no cervical, supraclavicular, axillary, or inguinal " "adenopathy    Diagnostic Test Results:  none     ASSESSMENT/PLAN:   1. Routine general medical examination at a health care facility  In good health  - TSH with free T4 reflex  - Comprehensive metabolic panel    2. Slow transit constipation  Push fluids, fiber add   - polyethylene glycol (MIRALAX) powder; Take 17 g (1 capful) by mouth daily  Dispense: 510 g; Refill: 1    3. Angular cheilitis  Use sunscreen add  - clotrimazole (LOTRIMIN) 1 % cream; Apply topically 2 times daily  Dispense: 15 g; Refill: 1  - hydrocortisone 1 % ointment; Apply sparingly to affected area three times daily for 14 days.  Dispense: 30 g; Refill: 0    COUNSELING:   Reviewed preventive health counseling, as reflected in patient instructions       Regular exercise       Healthy diet/nutrition       Vision screening       Hearing screening       Contraception       Family planning       Safe sex practices/STD prevention    BP Readings from Last 1 Encounters:   07/26/18 117/71     Estimated body mass index is 25.56 kg/(m^2) as calculated from the following:    Height as of this encounter: 5' 6.8\" (1.697 m).    Weight as of this encounter: 162 lb 3.2 oz (73.6 kg).           reports that she has never smoked. She has never used smokeless tobacco.      Counseling Resources:  ATP IV Guidelines  Pooled Cohorts Equation Calculator  Breast Cancer Risk Calculator  FRAX Risk Assessment  ICSI Preventive Guidelines  Dietary Guidelines for Americans, 2010  USDA's MyPlate  ASA Prophylaxis  Lung CA Screening    González Ko MD  [facility]  "

## 2018-10-24 ENCOUNTER — TELEPHONE (OUTPATIENT)
Dept: DERMATOLOGY | Facility: CLINIC | Age: 38
End: 2018-10-24

## 2018-10-24 NOTE — TELEPHONE ENCOUNTER
[facility] Call Center    Phone Message    May a detailed message be left on voicemail: yes    Reason for Call: Other: Pt needs to schedule an appt with Dr. Burt. Please give her a call back.      Action Taken: Message routed to:  [facility] ([facility]): Dermatology

## 2018-11-01 ENCOUNTER — TELEPHONE (OUTPATIENT)
Dept: DERMATOLOGY | Facility: CLINIC | Age: 38
End: 2018-11-01

## 2018-11-01 NOTE — TELEPHONE ENCOUNTER
I called and spoke with Lindsay and informed her to contact her pharmacy to see if she has any refills remaining on her Metrogel. I also confirmed the directions on how to use the Metrogel.    HALEY Lopez

## 2018-11-01 NOTE — TELEPHONE ENCOUNTER
[facility] Call Center    Phone Message    May a detailed message be left on voicemail: yes    Reason for Call: Other: PT would like a new script for metroNIDAZOLE (METROGEL) 0.75 % topical gel but has questions about it first.  Please follow up with the PT at [phone]     Action Taken: Message routed to:  [facility] ([facility] derm

## 2018-11-21 ENCOUNTER — OFFICE VISIT (OUTPATIENT)
Dept: DERMATOLOGY | Facility: CLINIC | Age: 38
End: 2018-11-21
Payer: COMMERCIAL

## 2018-11-21 DIAGNOSIS — L71.0 PERIORAL DERMATITIS: ICD-10-CM

## 2018-11-21 DIAGNOSIS — L71.9 ROSACEA: Primary | ICD-10-CM

## 2018-11-21 ASSESSMENT — PAIN SCALES - GENERAL: PAINLEVEL: NO PAIN (0)

## 2018-11-21 NOTE — NURSING NOTE
Dermatology Rooming Note    Lindsay Payne's goals for this visit include:   Chief Complaint   Patient presents with     Laser Treatment     Lindsay is here today for PDL treatment on her cheeks and nose.      HALEY Lopez

## 2018-11-21 NOTE — PROGRESS NOTES
Laser- VBeam(Pulsed Dye Laser) Procedure Note: Cosmetic     Procedure Date: 2018      Attending Staff Surgeon: Dr. Dinorah Burt      Resident Surgeon: Dr. Diann Mac      Assistant: Kinjal Rich LPN      Operating Room Data:     Surgery/Procedure Date:    SAME      Pre-operative Diagnosis:   Rosacea and spider angioma  Location: cheeks and nose      Operation/Procedure    Vbeam pulsed dye laser treatment#: 2        Post-operative Diagnosis:  SAME     Laser Settings:  Nose   Energy: 8.5 J/cm2  Spot size:7mm  Pulse width:  1.5 mS (0.45 thru 40 mS)  Dynamic cooling spray settin mS  Dynamic cooling device delay:  20 mS     Laser Settings:  Energy: 7 J/cm2  Spot size:7mm (upper cheeks)  Pulse width:  6 mS (0.45 thru 40 mS)  Dynamic cooling spray settin mS  Dynamic cooling device delay:  20 mS       Energy: 7 J/cm2  Spot size: 10mm (lateral and lower cheeks)  Pulse width:  6 mS (0.45 thru 40 mS)  Dynamic cooling spray settin mS  Dynamic cooling device delay:  20 mS     Anesthesia:  None     Description of Operation/Procedure:   The nature and purpose of the procedure, associated risks, possible consequences, complications and alternative methods of treatment were explained in detail, this includes but is not limited to hyperpigmentation, hypopigmentation, scarring, bruising, hair loss pain/discomfort, eye injury, and blister.   We reviewed that the outcome could be any of the following: no improvement, slight improvement or change in skin color & texture, the skin might be permanently lighter or darker, and though uncommon, superficial scarring may occur.  Multiple treatments may be recommended.   A photo and operative consent were obtained. Time-out was performed.  The patient was positioned to optimally expose the area treated.  Protective eyewear was worn by the patient and goggles on all personnel in the treatment room.  The patient confirmed the site to be treated. The laser energy  output was verified by meter reading.       The clinically evident lesion(s) was/were treated with Shara Vbeam pulsed dye laser (595 nm) beam as above.  A total of  70 pulses were used.  The patient tolerated the procedure well and no complications were noted. Post operative instructions were provided. The total laser operation and preparation time was 10 minutes.       The patient will follow-up in 4-6 weeks .     The patient will pay the cosmetic fee today.      Patient evaluated and discussed with Dr. Dinorah Burt.    Diann Mac MD  Medicine-Dermatology PGY-5  [phone]     Staff Involved:  Resident/Staff    Staff Physician Comments:   I saw and evaluated the patient with the resident and I agree with the assessment and plan.  I was present for the key portions of the above major procedure and examination..    Dinorah Burt MD    Department of Dermatology  [facility]: Phone: [phone], Fax:[phone]  [facility]: Phone: [phone], Fax: [phone]

## 2018-11-21 NOTE — PROGRESS NOTES
Dermatology Laser Intake Checklist:  History of psoriasis:No  History of recent tan, indoor or outdoor tanning/vacation or other sun exposure: No  History of vitiligo:No  Family history of vitiligo:No  Recent other cosmetic procedure(microderm abrasion/peel/hair removal/facial etc):No  History of HSV:No   Did the patient start valtrex: No  For genital laser hair removal patient only: Is there a history of genital warts or condyloma:No  Tattoo in the area to be treated:No  Is patient using hydroquinone:No  Retinoids and other acne medications stopped for 2 weeks:No  Has the patient had accutane in the last 6-12 months:No  Pregnant or breastfeeding: No  History of skin cancer in area planned for treatment: No  History of treatment with gold:No  Changes in medical history: No  Photos obtained: Yes  Does the patient smoke:No  Is the patient on ibuprofen/aspirin/plavix/coumadin/other blood thinner: No  If patient is taking narcotic or diazepam(valium)-does patient have : No  There were no vitals taken for this visit.

## 2018-11-21 NOTE — PATIENT INSTRUCTIONS
Please give the metronidazole gel more time to work.  You can also use a good moisturizer on the skin - Cetaphil for face, Cerave for face, Eucerin for face are good options.    Continue the metronidazole gel daily for 6 months.  Aveeno is a good moisturizer. Would hold off on the salicylic acid for now.  Stop the No 7 products in case these are plugging you up.    Most important for anti-aging is sunscreen in the morning and a retinol at night (like Differin).  You can buy Differin over the counter or on [employer]. Use pea-sized amount on face at night but skip the chin area for the next 3 months. If too irritating, can use every other day or 2-3 times per week.  Some dermatologists feel that using an antioxidant in the morning (like vitamin C or E) can help but the level of evidence is lower and it is very expensive.    Pulse Dye Laser    I will experience redness, swelling, pain, and heat sensation. I may experience bruising, itching, or acne. Risks are blistering, oozing, permanent scarring, hair loss,  temporary or permanent skin lightening or darkening, infection, and eye injury. I understand my outcome could be no improvement, slight improvement. Multiple treatments may be required.    After treatment, Do Not:    Rub, scratch, or put weight on the site for 2 weeks    Wear tight fitting clothing or jewelry over the site    Brown. Keep the site out of sunlight. Use sunscreen of 30 SPF or greater when in the sun. Use sunscreen 30 minutes before going out and reapply if sweating. Tanning decreases the success of the treatment     How do I care for the treated site?    Use ice packs for 10 minutes after the procedure for swelling     If the site is on your face, use ice again 1 hour after treatment    If a scab or crust forms, gently cleanse the site with hydrogen peroxide. Then put on Vaseline  ointment 3 times a day    Do not use makeup on any open wound    What should I expect?    Blue-gray color that may take 2 to  3 weeks to go away    Redness may also last a week or longer    Results may take up to 3 or 4 months after treatment    More procedures may be needed    Who should I call with questions?    [facility]: [phone]     [facility]: [phone]    For urgent needs outside of business hours call the [facility] at [phone] and ask for the dermatology resident on call

## 2018-11-21 NOTE — LETTER
2018       RE: Lindsay Payne  [address] [place] [address]     Dear Colleague,    Thank you for referring your patient, Lindsay Payne, to the [facility] DERMATOLOGY at [facility]. Please see a copy of my visit note below.    Dermatology Laser Intake Checklist:  History of psoriasis:No  History of recent tan, indoor or outdoor tanning/vacation or other sun exposure: No  History of vitiligo:No  Family history of vitiligo:No  Recent other cosmetic procedure(microderm abrasion/peel/hair removal/facial etc):No  History of HSV:No   Did the patient start valtrex: No  For genital laser hair removal patient only: Is there a history of genital warts or condyloma:No  Tattoo in the area to be treated:No  Is patient using hydroquinone:No  Retinoids and other acne medications stopped for 2 weeks:No  Has the patient had accutane in the last 6-12 months:No  Pregnant or breastfeeding: No  History of skin cancer in area planned for treatment: No  History of treatment with gold:No  Changes in medical history: No  Photos obtained: Yes  Does the patient smoke:No  Is the patient on ibuprofen/aspirin/plavix/coumadin/other blood thinner: No  If patient is taking narcotic or diazepam(valium)-does patient have : No  There were no vitals taken for this visit.        Laser- VBeam(Pulsed Dye Laser) Procedure Note: Cosmetic     Procedure Date: 2018      Attending Staff Surgeon: Dr. Dinorah Burt      Resident Surgeon: Dr. Diann Mac      Assistant: Kinjal Rich LPN      Operating Room Data:     Surgery/Procedure Date:    SAME      Pre-operative Diagnosis:   Rosacea and spider angioma  Location: cheeks and nose      Operation/Procedure    Vbeam pulsed dye laser treatment#: 2        Post-operative Diagnosis:  SAME     Laser Settings:  Nose   Energy: 8.5 J/cm2  Spot size:7mm  Pulse width:  1.5 mS (0.45 thru 40 mS)  Dynamic cooling spray settin mS  Dynamic cooling  device delay:  20 mS     Laser Settings:  Energy: 7 J/cm2  Spot size:7mm (upper cheeks)  Pulse width:  6 mS (0.45 thru 40 mS)  Dynamic cooling spray settin mS  Dynamic cooling device delay:  20 mS       Energy: 7 J/cm2  Spot size: 10mm (lateral and lower cheeks)  Pulse width:  6 mS (0.45 thru 40 mS)  Dynamic cooling spray settin mS  Dynamic cooling device delay:  20 mS     Anesthesia:  None     Description of Operation/Procedure:   The nature and purpose of the procedure, associated risks, possible consequences, complications and alternative methods of treatment were explained in detail, this includes but is not limited to hyperpigmentation, hypopigmentation, scarring, bruising, hair loss pain/discomfort, eye injury, and blister.   We reviewed that the outcome could be any of the following: no improvement, slight improvement or change in skin color & texture, the skin might be permanently lighter or darker, and though uncommon, superficial scarring may occur.  Multiple treatments may be recommended.   A photo and operative consent were obtained. Time-out was performed.  The patient was positioned to optimally expose the area treated.  Protective eyewear was worn by the patient and goggles on all personnel in the treatment room.  The patient confirmed the site to be treated. The laser energy output was verified by meter reading.       The clinically evident lesion(s) was/were treated with Shara Vbeam pulsed dye laser (595 nm) beam as above.  A total of  70 pulses were used.  The patient tolerated the procedure well and no complications were noted. Post operative instructions were provided. The total laser operation and preparation time was 10 minutes.       The patient will follow-up in 4-6 weeks .     The patient will pay the cosmetic fee today.      Patient evaluated and discussed with Dr. Dinorah Burt.    Diann Mac MD  Medicine-Dermatology PGY-5  [phone]     Staff  Involved:  Resident/Staff    Staff Physician Comments:   I saw and evaluated the patient with the resident and I agree with the assessment and plan.  I was present for the key portions of the above major procedure and examination..    Dinorah Burt MD    Department of Dermatology  [facility]: Phone: [phone], Fax:[phone]  [facility]: Phone: [phone], Fax: [phone]          Again, thank you for allowing me to participate in the care of your patient.      Sincerely,    Dinorah Burt MD

## 2018-11-21 NOTE — MR AVS SNAPSHOT
After Visit Summary   11/21/2018    Lindsay Payne    MRN: [id number]           Patient Information     Date Of Birth          1980        Visit Information        Provider Department      11/21/2018 10:15 AM Dinorah Burt MD [facility]        Today's Diagnoses     Rosacea    -  1    Perioral dermatitis          Care Instructions    Please give the metronidazole gel more time to work.  You can also use a good moisturizer on the skin - Cetaphil for face, Cerave for face, Eucerin for face are good options.    Continue the metronidazole gel daily for 6 months.  Aveeno is a good moisturizer. Would hold off on the salicylic acid for now.  Stop the No 7 products in case these are plugging you up.    Most important for anti-aging is sunscreen in the morning and a retinol at night (like Differin).  You can buy Differin over the counter or on Amazon. Use pea-sized amount on face at night but skip the chin area for the next 3 months. If too irritating, can use every other day or 2-3 times per week.  Some dermatologists feel that using an antioxidant in the morning (like vitamin C or E) can help but the level of evidence is lower and it is very expensive.    Pulse Dye Laser    I will experience redness, swelling, pain, and heat sensation. I may experience bruising, itching, or acne. Risks are blistering, oozing, permanent scarring, hair loss,  temporary or permanent skin lightening or darkening, infection, and eye injury. I understand my outcome could be no improvement, slight improvement. Multiple treatments may be required.    After treatment, Do Not:    Rub, scratch, or put weight on the site for 2 weeks    Wear tight fitting clothing or jewelry over the site    Brown. Keep the site out of sunlight. Use sunscreen of 30 SPF or greater when in the sun. Use sunscreen 30 minutes before going out and reapply if sweating. Tanning decreases the success of the treatment     How do I care for the treated  site?    Use ice packs for 10 minutes after the procedure for swelling     If the site is on your face, use ice again 1 hour after treatment    If a scab or crust forms, gently cleanse the site with hydrogen peroxide. Then put on Vaseline  ointment 3 times a day    Do not use makeup on any open wound    What should I expect?    Blue-gray color that may take 2 to 3 weeks to go away    Redness may also last a week or longer    Results may take up to 3 or 4 months after treatment    More procedures may be needed    Who should I call with questions?    [facility]: [phone]     [facility]: [phone]    For urgent needs outside of business hours call the [facility] at [phone] and ask for the dermatology resident on call          Follow-ups after your visit        Follow-up notes from your care team     Return in about 2 months (around 1/21/2019).      Who to contact     Please call your clinic at [phone] to:    Ask questions about your health    Make or cancel appointments    Discuss your medicines    Learn about your test results    Speak to your doctor            Additional Information About Your Visit        [employer] Information     [employer] gives you secure access to your electronic health record. If you see a primary care provider, you can also send messages to your care team and make appointments. If you have questions, please call your primary care clinic.  If you do not have a primary care provider, please call [phone] and they will assist you.      [employer] is an electronic gateway that provides easy, online access to your medical records. With [employer], you can request a clinic appointment, read your test results, renew a prescription or communicate with your care team.     To access your existing account, please contact your [facility] or call [phone] for assistance.        Care  EveryWhere ID     This is your Care EveryWhere ID. This could be used by other organizations to access your [place] medical records  [id number]         Blood Pressure from Last 3 Encounters:   07/26/18 117/71   09/05/17 106/69   07/06/17 94/69    Weight from Last 3 Encounters:   07/26/18 73.6 kg (162 lb 3.2 oz)   09/05/17 73.9 kg (163 lb)   07/06/17 73.5 kg (162 lb)              Today, you had the following     No orders found for display       Primary Care Provider Office Phone # Fax #    González Shorty Ko [phone]       [address] [facility] [address]  [facility] [address]        Equal Access to Services     HEATHER MESA : Hadii raymond ku hadasho Soomaali, waaxda luqadaha, qaybta kaalmada adeegyada, waxay ainsleyin haymichael arnold . So [facility] [phone].    ATENCIÓN: Si habla español, tiene a erickson disposición servicios gratuitos de asistencia lingüística. Ll[facility] [phone].    We comply with applicable federal civil rights laws and [place] laws. We do not discriminate on the basis of race, color, national origin, age, disability, sex, sexual orientation, or gender identity.            Thank you!     Thank you for choosing [facility]  for your care. Our goal is always to provide you with excellent care. Hearing back from our patients is one way we can continue to improve our services. Please take a few minutes to complete the written survey that you may receive in the mail after your visit with us. Thank you!             Your Updated Medication List - Protect others around you: Learn how to safely use, store and throw away your medicines at www.disposemymeds.org.          This list is accurate as of 11/21/18 11:27 AM.  Always use your most recent med list.                   Brand Name Dispense Instructions for use Diagnosis    clotrimazole 1 % cream    LOTRIMIN    15 g    Apply topically 2 times daily    Angular cheilitis       hydrocortisone 1 % ointment     30 g    Apply  sparingly to affected area three times daily for 14 days.    Angular cheilitis       imiquimod 5 % cream    ALDARA    12 packet    Apply a small sized amount to warts or molluscum three times weekly at bedtime.   Wash off after 8 hours.   May use for up to 16 weeks.    Common wart       indomethacin 50 MG capsule    INDOCIN    42 capsule    Take 1 capsule (50 mg) by mouth 3 times daily (with meals) for 14 days    Episcleritis of right eye       metroNIDAZOLE 0.75 % topical gel    METROGEL    45 g    Apply topically 2 times daily    Acne rosacea       omeprazole 20 MG CR capsule    priLOSEC    90 capsule    Take 1 capsule (20 mg) by mouth daily    Gastroesophageal reflux disease, esophagitis presence not specified       polyethylene glycol powder    MIRALAX    510 g    Take 17 g (1 capful) by mouth daily    Slow transit constipation       Salicylic Acid 27.5 % Liqd     10 mL    Soak wart in warm water for 5 minutes. Dry area thoroughly. Apply to entire wart surface, allow to dry, and then apply a second time. Avoid contact with surrounding skin. Continue therapy once or twice daily. Resolution may be expected after 4 to 6 weeks; some warts may take longer to remove.    Common wart

## 2018-12-05 ENCOUNTER — OFFICE VISIT (OUTPATIENT)
Dept: FAMILY MEDICINE | Facility: CLINIC | Age: 38
End: 2018-12-05
Payer: COMMERCIAL

## 2018-12-05 VITALS — SYSTOLIC BLOOD PRESSURE: 103 MMHG | DIASTOLIC BLOOD PRESSURE: 67 MMHG | TEMPERATURE: 98.8 F

## 2018-12-05 DIAGNOSIS — Z71.84 TRAVEL ADVICE ENCOUNTER: Primary | ICD-10-CM

## 2018-12-05 DIAGNOSIS — Z23 NEED FOR VACCINATION: ICD-10-CM

## 2018-12-05 PROCEDURE — 90717 YELLOW FEVER VACCINE SUBQ: CPT | Mod: GA | Performed by: NURSE PRACTITIONER

## 2018-12-05 PROCEDURE — 90471 IMMUNIZATION ADMIN: CPT | Mod: GA | Performed by: NURSE PRACTITIONER

## 2018-12-05 PROCEDURE — 90632 HEPA VACCINE ADULT IM: CPT | Mod: GA | Performed by: NURSE PRACTITIONER

## 2018-12-05 PROCEDURE — 90472 IMMUNIZATION ADMIN EACH ADD: CPT | Mod: GA | Performed by: NURSE PRACTITIONER

## 2018-12-05 PROCEDURE — 99402 PREV MED CNSL INDIV APPRX 30: CPT | Mod: 25 | Performed by: NURSE PRACTITIONER

## 2018-12-05 RX ORDER — AZITHROMYCIN 500 MG/1
500 TABLET, FILM COATED ORAL DAILY
Qty: 3 TABLET | Refills: 0 | Status: SHIPPED | OUTPATIENT
Start: 2018-12-05 | End: 2018-12-08

## 2018-12-05 RX ORDER — ATOVAQUONE AND PROGUANIL HYDROCHLORIDE 250; 100 MG/1; MG/1
1 TABLET, FILM COATED ORAL DAILY
Qty: 15 TABLET | Refills: 0 | Status: SHIPPED | OUTPATIENT
Start: 2018-12-05 | End: 2019-06-19

## 2018-12-05 NOTE — NURSING NOTE
Screening Questionnaire for Adult Immunization    Are you sick today?   No   Do you have allergies to medications, food, a vaccine component or latex?   No   Have you ever had a serious reaction after receiving a vaccination?   No   Do you have a long-term health problem with heart disease, lung disease, asthma, kidney disease, metabolic disease (e.g. diabetes), anemia, or other blood disorder?   No   Do you have cancer, leukemia, HIV/AIDS, or any other immune system problem?   No   In the past 3 months, have you taken medications that affect  your immune system, such as prednisone, other steroids, or anticancer drugs; drugs for the treatment of rheumatoid arthritis, Crohn s disease, or psoriasis; or have you had radiation treatments?   No   Have you had a seizure, or a brain or other nervous system problem?   No   During the past year, have you received a transfusion of blood or blood     products, or been given immune (gamma) globulin or antiviral drug?   No   For women: Are you pregnant or is there a chance you could become        pregnant during the next month?   No   Have you received any vaccinations in the past 4 weeks?   No     Immunization questionnaire answers were all negative.      Prior to injection verified patient identity using patient's name and date of birth.  Due to injection administration, patient instructed to remain in clinic for 15 minutes  afterwards, and to report any adverse reaction to me immediately.      Per orders of SONIYA Esparza, injection of YF, and Hep A given by Lexie Dodge. Patient instructed to remain in clinic for 15 minutes afterwards, and to report any adverse reaction to me immediately.       Screening performed by Lexie Dodge on 12/5/2018 at 5:32 PM.

## 2018-12-05 NOTE — NURSING NOTE
"Chief Complaint   Patient presents with     [facility]     initial /67 (BP Location: Left arm)  Temp 98.8  F (37.1  C) (Oral) Estimated body mass index is 25.56 kg/(m^2) as calculated from the following:    Height as of 7/26/18: 5' 6.8\" (1.697 m).    Weight as of 7/26/18: 162 lb 3.2 oz (73.6 kg).  BP completed using cuff size: regular.  L  arm      Health Maintenance that is potentially due pending provider review:  NONE    n/a    Richmond King ma  "

## 2018-12-05 NOTE — PATIENT INSTRUCTIONS
Today December 5, 2018 you received the    Hepatitis A Vaccine - Please return on 6/3/19 or later for your 2nd and final dose.    Yellow Fever (YF)  .    These appointments can be made as a NURSE ONLY visit.    **It is very important for the vaccinations to be given on the scheduled day(s), this helps ensure you receive the full effectiveness of the vaccine.**    Please call [facility] with any questions [phone]    Thank you for visiting [facility]

## 2018-12-05 NOTE — PROGRESS NOTES
Nurse Note      Itinerary:  [place]      Departure Date: 12/8/18      Return Date: 12/13/18      Length of Trip 5 days      Reason for Travel: Business           Urban or rural: urban      Accommodations: Hotel        IMMUNIZATION HISTORY  Have you received any immunizations within the past 4 weeks?  No  Have you ever fainted from having your blood drawn or from an injection?  No  Have you ever had a fever reaction to vaccination?  No  Have you ever had any bad reaction or side effect from any vaccination?  No  Have you ever had hepatitis A or B vaccine?  No  Do you live (or work closely) with anyone who has AIDS, an AIDS-like condition, any other immune disorder or who is on chemotherapy for cancer?  No  Do you have a family history of immunodeficiency?  No  Have you received any injection of immune globulin or any blood products during the past 12 months?  No    Patient roomed by Richmond Lai  Lindsay Payne is a 38 year old female seen today alone for counsultation for international travel to [place] for Business.  Patient will be departing in  3 day(s) and staying for   1 week(s) and  traveling with co-worker(s).      Patient itinerary :  will be in the urban region of [facility] which presents risk for Malaria and Yellow Fever. exposure.      Patient's activities will include business and conference.    Patient's country of birth is [place]    Special medical concerns: none  Pre-travel questionnaire was completed by patient and reviewed by provider.     Vitals: /67 (BP Location: Left arm)  Temp 98.8  F (37.1  C) (Oral)  BMI= There is no height or weight on file to calculate BMI.    EXAM:  General:  Well-nourished, well-developed in no acute distress.  Appears to be stated age, interacts appropriately and expresses understanding of information given to patient.    Current Outpatient Prescriptions   Medication Sig Dispense Refill     clotrimazole (LOTRIMIN) 1 % cream Apply topically 2  times daily 15 g 1     hydrocortisone 1 % ointment Apply sparingly to affected area three times daily for 14 days. 30 g 0     imiquimod (ALDARA) 5 % cream Apply a small sized amount to warts or molluscum three times weekly at bedtime.   Wash off after 8 hours.   May use for up to 16 weeks. 12 packet 3     indomethacin (INDOCIN) 50 MG capsule Take 1 capsule (50 mg) by mouth 3 times daily (with meals) for 14 days 42 capsule 0     metroNIDAZOLE (METROGEL) 0.75 % topical gel Apply topically 2 times daily (Patient not taking: Reported on 11/21/2018) 45 g 3     omeprazole (PRILOSEC) 20 MG CR capsule Take 1 capsule (20 mg) by mouth daily 90 capsule 1     polyethylene glycol (MIRALAX) powder Take 17 g (1 capful) by mouth daily 510 g 1     Salicylic Acid 27.5 % LIQD Soak wart in warm water for 5 minutes. Dry area thoroughly. Apply to entire wart surface, allow to dry, and then apply a second time. Avoid contact with surrounding skin. Continue therapy once or twice daily. Resolution may be expected after 4 to 6 weeks; some warts may take longer to remove. 10 mL 1     Patient Active Problem List   Diagnosis     CARDIOVASCULAR SCREENING; LDL GOAL LESS THAN 160     Paraguard intrauterine device     Gastroesophageal reflux disease, esophagitis presence not specified     Allergies   Allergen Reactions     Pnv [Prenatal Vit [id number]] Itching and Rash         Immunizations discussed include:   Hepatitis A:  Ordered/given today, risks, benefits and side effects reviewed  Hepatitis B: Declined  Not concerned about risk of disease  Influenza: Declined  Not concerned about risk of disease  Typhoid: Declined  Not concerned about risk of disease and Insufficient time to vaccinate  Rabies: Insufficient time to vaccinate  Yellow Fever: Stamaril Ordered/given today - consent completed, side effects, precautions, allergies, risks discussed. Patient expressed understanding.  Syriac Encephalitis: Not indicated  Meningococcus: Not  indicated  Tetanus/Diphtheria: Up to date  Measles/Mumps/Rubella: Up to date  Cholera: Not needed  Polio: Up to date  Pneumococcal: Under age of 65  Varicella: Up to date  Zostavax:  Not indicated  Shingrix: Not indicated  HPV:  Not indicated  TB:  Low risk     Stamaril Informed Consent    The patient was provided with a copy of the IRB-approved consent form and all questions were answered before the patient agreed to participate by signing the informed consent document.   A copy of the form was provided to the patient.    Date: December 5, 2018   Consent Version Date: 05/10/2018  Consent Obtained by:  Ruma Esparza CNP (Lori)     HIPAA:  Yes  HIPAA Authorization Signed Date: December 5, 2018       Inclusion/Exclusion Criteria:    (Similar to Yellow Fever-VAX)      The patient met all of the following inclusion criteria in order to be eligible for the Stamaril vaccination under this EAP (Expanded Access Investigational New Drug Program)           At increased risk for YF, including researchers, laboratory workers, vaccine production staff, and those who are traveling within 30 days to a YF-endemic region or to a country requiring proof of YF vaccination under IHRs (International Health Regulations)?       Yes     Patient is greater than or equal to 9 months of age on the day of vaccination?     Yes     Patient is greater than or equal to 18 years of age and signed and dated the Consent Forms?     Yes     Patient is < 18 years of age and parent(s)/guardian(s) signed and dated the Consent Forms?      Patient is 7 years to < 18 years of age and signed and dated the Assent form?        No Assent is required.  Patient is <7 years of age.     No      No      N/A     The patient did not meet any of the following criteria that would have excluded the patient from receiving the Stamaril vaccination under this EAP              Patient is less than 9 months of age.       No     The patient is breast-feeding and cannot stop  nursing for at least 14 days after vaccination.    Note: Yellow Fever vaccine virus may be transmissible via breast milk by nursing mothers who are vaccinated during the final 2 weeks of pregnancy or post-partum.   Following transmission, infants may develop encephalitis.  The minimum time of discontinuation of breastfeeding for 14 days after vaccination is based on the expected clearance of live-attenuated vaccine virus.       No     The patient is immunosuppressed, whether congenital or idiopathic, including for example, leukemia, lymphoma, other malignancies, and patients who are receiving immunosuppressant medications (e.g. Systemic corticosteroids [greater than the standard dose of topical or inhaled steroids], alkylating drugs, antimetabolites, of other cytotoxic or immunomodulatory drugs) or radiation therapy or organ transplantation.       No     The patient has known hypersensitivity to the active substance or to any of the excipients of Stamaril vaccine or to eggs or chicken proteins.     no     The patient is symptomatic for human immunodeficiency virus (HIV) infection     No     The patient is asymptomatic for HIV infection but accompanied by evidence of severe immune suppression    Note:  Evidence of severe immune suppression includes CD4+ T-cell counts < 200 cubic millimeters (or < 15% total lymphocytes in children aged < 6 years), or as determined by the health care provider.       No     The patient has a history of thymus dysfunction (including myasthenia gravis, thymoma, thymectomy)     No     Moderate or severe febrile illness or acute illness    Note: Participation in the EAP can be reassessed when moderate or severe febrile illness or acute illness has resolved.       No         Altitude Exposure on this trip: no  Past tolerance to Altitude: na    ASSESSMENT/PLAN:    ICD-10-CM    1. Travel advice encounter Z71.89 C YELLOW FEVER IMMUNIZATION, LIVE, SQ (STAMARIL)     HEPA VACCINE ADULT IM      atovaquone-proguanil (MALARONE) 250-100 MG tablet     azithromycin (ZITHROMAX) 500 MG tablet   2. Need for vaccination Z23 C YELLOW FEVER IMMUNIZATION, LIVE, SQ (STAMARIL)     HEPA VACCINE ADULT IM     I have reviewed general recommendations for safe travel   including: food/water precautions, insect precautions, safer sex   practices given high prevalence of Zika, HIV and other STDs,   roadway safety. Educational materials and [employer] report provided.    Malaraia prophylaxis recommended: Malarone  Symptomatic treatment for traveler's diarrhea: azithromycin  Altitude illness prevention and treatment: none      Evacuation insurance advised and resources were provided to patient.    Total visit time 30 minutes  with over 50% of time spent counseling patient as detailed above.    Ruma Esparza CNP

## 2018-12-05 NOTE — MR AVS SNAPSHOT
After Visit Summary   12/5/2018    Lindsay Payne    MRN: [id number]           Patient Information     Date Of Birth          1980        Visit Information        Provider Department      12/5/2018 12:30 PM Ruma Esparza APRN [facility]        Today's Diagnoses     Travel advice encounter    -  1    Need for vaccination          Care Instructions    Today December 5, 2018 you received the    Hepatitis A Vaccine - Please return on 6/3/19 or later for your 2nd and final dose.    Yellow Fever (YF)  .    These appointments can be made as a NURSE ONLY visit.    **It is very important for the vaccinations to be given on the scheduled day(s), this helps ensure you receive the full effectiveness of the vaccine.**    Please call [facility] with any questions [phone]    Thank you for visiting [facility]            Follow-ups after your visit        Who to contact     If you have questions or need follow up information about [facility]'s clinic visit or your schedule please contact [facility] directly at [phone].  Normal or non-critical lab and imaging results will be communicated to you by [employer], letter or phone within 4 business days after the clinic has received the results. If you do not hear from us within 7 days, please contact the clinic through [employer] or phone. If you have a critical or abnormal lab result, we will notify you by phone as soon as possible.  Submit refill requests through [employer] or call your pharmacy and they will forward the refill request to us. Please allow 3 business days for your refill to be completed.          Additional Information About Your Visit        MyChart Information     [employer] gives you secure access to your electronic health record. If you see a primary care provider, you can also send messages to your care team and make appointments. If you have questions, please call your primary  care clinic.  If you do not have a primary care provider, please call [phone] and they will assist you.        Care EveryWhere ID     This is your Care EveryWhere ID. This could be used by other organizations to access your [facility] medical records  [id number]        Your Vitals Were     Temperature                   98.8  F (37.1  C) (Oral)            Blood Pressure from Last 3 Encounters:   12/05/18 103/67   07/26/18 117/71   09/05/17 106/69    Weight from Last 3 Encounters:   07/26/18 162 lb 3.2 oz (73.6 kg)   09/05/17 163 lb (73.9 kg)   07/06/17 162 lb (73.5 kg)              We Performed the Following     C YELLOW FEVER IMMUNIZATION, LIVE, SQ (STAMARIL)     HEPA VACCINE ADULT IM          Today's Medication Changes          These changes are accurate as of 12/5/18  1:33 PM.  If you have any questions, ask your nurse or doctor.               Start taking these medicines.        Dose/Directions    atovaquone-proguanil 250-100 MG tablet   Commonly known as:  MALARONE   Used for:  Travel advice encounter   Started by:  Ruma Esparza APRN CNP        Dose:  1 tablet   Take 1 tablet by mouth daily Start 2 days before exposure to Malaria and continue daily till  7 days after exposure.   Quantity:  15 tablet   Refills:  0       azithromycin 500 MG tablet   Commonly known as:  ZITHROMAX   Used for:  Travel advice encounter   Started by:  Ruma Esparza APRN CNP        Dose:  500 mg   Take 1 tablet (500 mg) by mouth daily for 3 doses Take 1 tablet a day for up to 3 days for severe diarrhea   Quantity:  3 tablet   Refills:  0            Where to get your medicines      [facility] - [place], [place] - [address], [place] [address]     Phone:  [phone]     atovaquone-proguanil 250-100 MG tablet    azithromycin 500 MG tablet                Primary Care Provider Office Phone # Fax #    González Ko,  [phone]       [address]  [facility] [address]        Equal Access to Services     PEDRO MESA : Hadii aad ku hadwilbur Dent, kim jennyraul, russel neal, gladys nievesjacob novak. So [facility] [phone].    ATENCIÓN: Si habla español, tiene a erickson disposición servicios gratuitos de asistencia lingüística. Llame al [phone].    We comply with applicable federal civil rights laws and [place] laws. We do not discriminate on the basis of race, color, national origin, age, disability, sex, sexual orientation, or gender identity.            Thank you!     Thank you for choosing [facility]  for your care. Our goal is always to provide you with excellent care. Hearing back from our patients is one way we can continue to improve our services. Please take a few minutes to complete the written survey that you may receive in the mail after your visit with us. Thank you!             Your Updated Medication List - Protect others around you: Learn how to safely use, store and throw away your medicines at www.disposemymeds.org.          This list is accurate as of 12/5/18  1:33 PM.  Always use your most recent med list.                   Brand Name Dispense Instructions for use Diagnosis    atovaquone-proguanil 250-100 MG tablet    MALARONE    15 tablet    Take 1 tablet by mouth daily Start 2 days before exposure to Malaria and continue daily till  7 days after exposure.    Travel advice encounter       azithromycin 500 MG tablet    ZITHROMAX    3 tablet    Take 1 tablet (500 mg) by mouth daily for 3 doses Take 1 tablet a day for up to 3 days for severe diarrhea    Travel advice encounter       metroNIDAZOLE 0.75 % external gel    METROGEL    45 g    Apply topically 2 times daily    Acne rosacea       omeprazole 20 MG DR capsule    priLOSEC    90 capsule    Take 1 capsule (20 mg) by mouth daily    Gastroesophageal reflux disease, esophagitis  presence not specified       polyethylene glycol powder    MIRALAX    510 g    Take 17 g (1 capful) by mouth daily    Slow transit constipation       Salicylic Acid 27.5 % Liqd     10 mL    Soak wart in warm water for 5 minutes. Dry area thoroughly. Apply to entire wart surface, allow to dry, and then apply a second time. Avoid contact with surrounding skin. Continue therapy once or twice daily. Resolution may be expected after 4 to 6 weeks; some warts may take longer to remove.    Common wart

## 2019-05-02 ENCOUNTER — TELEPHONE (OUTPATIENT)
Dept: DERMATOLOGY | Facility: CLINIC | Age: 39
End: 2019-05-02

## 2019-05-02 NOTE — TELEPHONE ENCOUNTER
[facility] Call Center    Phone Message    May a detailed message be left on voicemail: yes    Reason for Call: Other: Patient would like to schedule laser treatment with Dr. Burt.       Action Taken: Message routed to:  [facility] ([facility] clinic coord derm

## 2019-05-30 ENCOUNTER — OFFICE VISIT (OUTPATIENT)
Dept: FAMILY MEDICINE | Facility: CLINIC | Age: 39
End: 2019-05-30
Payer: COMMERCIAL

## 2019-05-30 VITALS
SYSTOLIC BLOOD PRESSURE: 106 MMHG | TEMPERATURE: 97.8 F | RESPIRATION RATE: 14 BRPM | OXYGEN SATURATION: 100 % | HEART RATE: 72 BPM | BODY MASS INDEX: 24.53 KG/M2 | DIASTOLIC BLOOD PRESSURE: 70 MMHG | HEIGHT: 67 IN | WEIGHT: 156.3 LBS

## 2019-05-30 DIAGNOSIS — J01.90 ACUTE SINUSITIS WITH SYMPTOMS > 10 DAYS: Primary | ICD-10-CM

## 2019-05-30 DIAGNOSIS — B37.31 YEAST INFECTION OF THE VAGINA: ICD-10-CM

## 2019-05-30 DIAGNOSIS — J31.0 CHRONIC RHINITIS: ICD-10-CM

## 2019-05-30 PROCEDURE — 99214 OFFICE O/P EST MOD 30 MIN: CPT | Performed by: PHYSICIAN ASSISTANT

## 2019-05-30 RX ORDER — MONTELUKAST SODIUM 10 MG/1
10 TABLET ORAL AT BEDTIME
Qty: 90 TABLET | Refills: 2 | Status: SHIPPED | OUTPATIENT
Start: 2019-05-30 | End: 2021-11-12

## 2019-05-30 RX ORDER — FLUCONAZOLE 150 MG/1
150 TABLET ORAL ONCE
Qty: 1 TABLET | Refills: 0 | Status: SHIPPED | OUTPATIENT
Start: 2019-05-30 | End: 2019-06-19

## 2019-05-30 RX ORDER — METHYLPREDNISOLONE 4 MG
TABLET, DOSE PACK ORAL
Qty: 21 TABLET | Refills: 0 | Status: SHIPPED | OUTPATIENT
Start: 2019-05-30 | End: 2019-06-19

## 2019-05-30 ASSESSMENT — MIFFLIN-ST. JEOR: SCORE: 1427.98

## 2019-05-30 NOTE — PATIENT INSTRUCTIONS
Take medications as directed  Whole 30 program  Return to clinic for any new or worsening symptoms or go to ER Urgent care in off hours

## 2019-05-30 NOTE — PROGRESS NOTES
"Subjective     Lindsay Payne is a 38 year old female who presents to clinic today for the following health issues:    HPI     Cold Symptoms  -Patient reports cold symptoms for the last 3 weeks, has not been improving  -Reports dry cough, rhinorrea, fatigue, hoarse voice, post nasal drip  -She is having trouble taking a deep breath, has used her Albuterol inhaler which only helps for 10 or so minutes  -States that she had \"stomach flu\" on Sunday, only lasted for one day  -Has had itchy irritated eyes at times, used eye drops that worked well, but does not believe she has significant allergies  -Patient is an avid runner, has been running while she has been sick  -She got 8 hours of sleep last night, typically gets around 7  -Her diet has been good, has been trying to lose weight recently and is eating healthy  -Not currently taking a multivitamin    Problem list and histories reviewed & adjusted, as indicated.  Additional history: as documented    ROS:  CONSTITUTIONAL: NEGATIVE for fever, chills, change in weight  INTEGUMENTARY/SKIN: NEGATIVE for worrisome rashes, moles or lesions  EYES: NEGATIVE for vision changes or irritation  ENT/MOUTH: NEGATIVE for ear, mouth problems, POSITIVE sinus problems, throat problems  RESP: NEGATIVE for SOB, POSITIVE significant cough  BREAST: NEGATIVE for masses, tenderness or discharge  CV: NEGATIVE for chest pain, palpitations or peripheral edema  GI: NEGATIVE for nausea, abdominal pain, heartburn, or change in bowel habits  : NEGATIVE for frequency, dysuria, or hematuria  MUSCULOSKELETAL: NEGATIVE for significant arthralgias or myalgia  NEURO: NEGATIVE for weakness, dizziness or paresthesias  ENDOCRINE: NEGATIVE for temperature intolerance, skin/hair changes  HEME: NEGATIVE for bleeding problems  PSYCHIATRIC: NEGATIVE for changes in mood or affect    This document serves as a record of the services and decisions personally performed and made by Kimmy Baltazar PA-C. It was " created on her behalf by Nando Johnson, trained medical scribe. The creation of this document is based on the provider's statements to the medical scribe.  Nando Johnson 8:47 AM May 30, 2019    Patient Active Problem List   Diagnosis     CARDIOVASCULAR SCREENING; LDL GOAL LESS THAN 160     Paraguard intrauterine device     Gastroesophageal reflux disease, esophagitis presence not specified     Past Surgical History:   Procedure Laterality Date     APPENDECTOMY  1994       Social History     Tobacco Use     Smoking status: Never Smoker     Smokeless tobacco: Never Used   Substance Use Topics     Alcohol use: Yes     Comment: 1-2 drinks per month     Family History   Problem Relation Age of Onset     Cancer Mother         malignant melanoma     Arthritis Father         Bekhterev's disease-spinal suffication     Glaucoma Maternal Grandmother            Labs reviewed in EPIC  Patient Active Problem List   Diagnosis     CARDIOVASCULAR SCREENING; LDL GOAL LESS THAN 160     Paraguard intrauterine device     Gastroesophageal reflux disease, esophagitis presence not specified     Past Surgical History:   Procedure Laterality Date     APPENDECTOMY  1994       Social History     Tobacco Use     Smoking status: Never Smoker     Smokeless tobacco: Never Used   Substance Use Topics     Alcohol use: Yes     Comment: 1-2 drinks per month     Family History   Problem Relation Age of Onset     Cancer Mother         malignant melanoma     Arthritis Father         Bekhterev's disease-spinal suffication     Glaucoma Maternal Grandmother          Current Outpatient Medications   Medication Sig Dispense Refill     amoxicillin-clavulanate (AUGMENTIN) 875-125 MG tablet Take 1 tablet by mouth 2 times daily for 10 days 20 tablet 0     fluconazole (DIFLUCAN) 150 MG tablet Take 1 tablet (150 mg) by mouth once for 1 dose 1 tablet 0     methylPREDNISolone (MEDROL DOSEPAK) 4 MG tablet therapy pack Follow Package Directions 21 tablet 0      "montelukast (SINGULAIR) 10 MG tablet Take 1 tablet (10 mg) by mouth At Bedtime 90 tablet 2     atovaquone-proguanil (MALARONE) 250-100 MG tablet Take 1 tablet by mouth daily Start 2 days before exposure to Malaria and continue daily till  7 days after exposure. 15 tablet 0     metroNIDAZOLE (METROGEL) 0.75 % topical gel Apply topically 2 times daily (Patient not taking: Reported on 11/21/2018) 45 g 3     omeprazole (PRILOSEC) 20 MG CR capsule Take 1 capsule (20 mg) by mouth daily 90 capsule 1     polyethylene glycol (MIRALAX) powder Take 17 g (1 capful) by mouth daily 510 g 1     Salicylic Acid 27.5 % LIQD Soak wart in warm water for 5 minutes. Dry area thoroughly. Apply to entire wart surface, allow to dry, and then apply a second time. Avoid contact with surrounding skin. Continue therapy once or twice daily. Resolution may be expected after 4 to 6 weeks; some warts may take longer to remove. 10 mL 1       OBJECTIVE:                                                    /70   Pulse 72   Temp 97.8  F (36.6  C) (Oral)   Resp 14   Ht 1.696 m (5' 6.77\")   Wt 70.9 kg (156 lb 4.8 oz)   SpO2 100%   BMI 24.65 kg/m   Body mass index is 24.65 kg/m .   GENERAL:  Alert and oriented x 3. No acute distress. WD WN  HEAD:  Normocephalic.Atramautic  EYES:  PERRLA.  EOMs are full.  Sclerae are clear.  Fundi not visualized. No discharge  EARS:  Normal canal without edema exudates or discharge. TM normal. No bulging or retraction  NOSE:  Clear rhinorrhea  MOUTH/THROAT:  Oral hygene is good. Moist mucus membranes. No oral or pharyngeal lesions are seen. Oropharynx without exudates edema. With mild erythema.  NECK:  Supple.  No lymphadenopathy. ROM intact without nuchal rigidity.  LUNGS:  Clear to auscultation bilaterally without rhonchi rhales or wheezes. Chest rise symmetrical and no tenderness to palpation. Good breath sounds throughout.  HEART:  Regular rhythm.  Normal rate.  No murmur, gallop, rub or  ectopy.  PMI is not " "displaced.  EXTREMITIES:  Warm, well perfused with good ROM and strength. No cyanosis or edema.    SKIN:  Warm and dry.  No rashes  NEUROLOGIC:  CN 2-12 grossly intact.  No focal neurological deficits.  PSYCH: mentation appears normal, affect normal/bright    No results found for this or any previous visit (from the past 24 hour(s)).       ASSESSMENT/PLAN:                                                        ICD-10-CM    1. Acute sinusitis with symptoms > 10 days J01.90 amoxicillin-clavulanate (AUGMENTIN) 875-125 MG tablet     methylPREDNISolone (MEDROL DOSEPAK) 4 MG tablet therapy pack   2. Yeast infection of the vagina B37.3 fluconazole (DIFLUCAN) 150 MG tablet   3. Chronic rhinitis J31.0 montelukast (SINGULAIR) 10 MG tablet     Consider coming back to optimize immune health. She gets sinus infections at different times of the year associated with a shortness of breath that isn't classified as asthma. We'll try daily singulair to see if this helps reduce the severity and occurrence of symptoms as it seems allergy related. Interestingly she doesn't complain of chronic sinus pressure/congestion, so most likely this is a structural sinus problem, but the shortness of breath which resolves only minimally with albuterol and mostly with a steroid makes me think it is more an inflammatory reaction associated with asthma. She has an inhaler which she'll continue to use as needed. Return to clinic for any new or worsening symptoms or go to ER Urgent care in off hours    Patient Instructions   Take medications as directed  Whole 30 program  Return to clinic for any new or worsening symptoms or go to ER Urgent care in off hours         Estimated body mass index is 24.65 kg/m  as calculated from the following:    Height as of this encounter: 1.696 m (5' 6.77\").    Weight as of this encounter: 70.9 kg (156 lb 4.8 oz).   Weight management plan: See PCP    The information in this document, created by the medical scribe for me, " accurately reflects the services I personally performed and the decisions made by me. I have reviewed and approved this document for accuracy prior to leaving the patient care area.  May 30, 2019 8:47 AM    Kimmy Baltazar  [facility]

## 2019-06-19 ENCOUNTER — OFFICE VISIT (OUTPATIENT)
Dept: DERMATOLOGY | Facility: CLINIC | Age: 39
End: 2019-06-19
Payer: COMMERCIAL

## 2019-06-19 DIAGNOSIS — L71.9 ROSACEA: Primary | ICD-10-CM

## 2019-06-19 DIAGNOSIS — L71.9 ACNE ROSACEA: ICD-10-CM

## 2019-06-19 DIAGNOSIS — Z86.018 HISTORY OF DYSPLASTIC NEVUS: ICD-10-CM

## 2019-06-19 DIAGNOSIS — L71.0 PERIORAL DERMATITIS: ICD-10-CM

## 2019-06-19 DIAGNOSIS — L98.8 RHYTIDES: ICD-10-CM

## 2019-06-19 RX ORDER — METRONIDAZOLE 7.5 MG/G
GEL TOPICAL
Qty: 45 G | Refills: 11 | Status: SHIPPED | OUTPATIENT
Start: 2019-06-19 | End: 2019-11-05

## 2019-06-19 ASSESSMENT — PAIN SCALES - GENERAL: PAINLEVEL: NO PAIN (0)

## 2019-06-19 NOTE — LETTER
6/19/2019       RE: Lindsay Payne  [address] [place] [address]     Dear Colleague,    Thank you for referring your patient, Lindsay Payne, to the [facility]. Please see a copy of my visit note below.    error    Dermatology Laser Intake Checklist:  History of psoriasis:No  History of recent tan, indoor or outdoor tanning/vacation or other sun exposure: No  History of vitiligo:No  Family history of vitiligo:No  Recent other cosmetic procedure(microderm abrasion/peel/hair removal/facial etc):No  History of HSV:No   Did the patient start valtrex: No  For genital laser hair removal patient only: Is there a history of genital warts or condyloma:No  Tattoo in the area to be treated:No  Is patient using hydroquinone:No  Retinoids and other acne medications stopped for 2 weeks:No  Has the patient had accutane in the last 6-12 months:No  Pregnant or breastfeeding: No  History of skin cancer in area planned for treatment: No  History of treatment with gold:No  Changes in medical history: No  Photos obtained: Yes  Does the patient smoke:No  Is the patient on ibuprofen/aspirin/plavix/coumadin/other blood thinner: No  If patient is taking narcotic or diazepam(valium)-does patient have : No  There were no vitals taken for this visit.        [facility] Dermatology Note    Dermatology Problem List:  1. Family history of melanoma   2. 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 Rosacea: metrogel  - s/p PDL 5/9/18, 11/21/2018   4. Spider angioma, nasal bridge  - s/p PDL 5/9/18, 11/21/2018   5. History of nevus excision left breast in [place], non-dysplastic per patient  6. Perioral derm. Metrogel    Encounter Date: Jun 19, 2019    CC:   Chief Complaint   Patient presents with     Laser Treatment     Lindsay is her today for a PDL treatment.      History of Present  Illness:  Ms. Lindsay Payne is a 38 year old female who presents as a follow-up for rosacea. The patient was last seen 11/21/2018 when she received a PDL treatment on her cheeks and nose. Metronidazole gel was continued and Differin was started at bedtime.    Since then, the patient states she stopped her Metronidazole in the early Spring and is also only using the Differin cream on the weekends as it makes her face red.     Today, she reports raised skin and roughness on her chin. She also reports of a red spot on her knee that she continually scratches. She only notices it after she shaves her leg.       Past Medical History:   Patient Active Problem List   Diagnosis     CARDIOVASCULAR SCREENING; LDL GOAL LESS THAN 160     Paraguard intrauterine device     Gastroesophageal reflux disease, esophagitis presence not specified     Past Medical History:   Diagnosis Date     NO ACTIVE PROBLEMS      Past Surgical History:   Procedure Laterality Date     APPENDECTOMY  1994     Social History:   reports that she has never smoked. She has never used smokeless tobacco. She reports that she drinks alcohol. She reports that she does not use drugs.    Family History:  Family History   Problem Relation Age of Onset     Cancer Mother         malignant melanoma     Arthritis Father         Bekhterev's disease-spinal suffication     Glaucoma Maternal Grandmother      Medications:  Current Outpatient Medications   Medication Sig Dispense Refill     montelukast (SINGULAIR) 10 MG tablet Take 1 tablet (10 mg) by mouth At Bedtime 90 tablet 2     omeprazole (PRILOSEC) 20 MG CR capsule Take 1 capsule (20 mg) by mouth daily 90 capsule 1     polyethylene glycol (MIRALAX) powder Take 17 g (1 capful) by mouth daily 510 g 1      Allergies   Allergen Reactions     Pnv [Prenatal Vit I-Pb-[id number]-Fa] Itching and Rash     Review of Systems:     -OB: Pt is not pregnant or breast feeding     Physical exam:  Vitals: There were no vitals taken for  this visit.  GEN: This is a well developed, well-nourished female in no acute distress, in a pleasant mood.    SKIN: Focused examination of the face was performed.  - Slightly tan skin   - 1 mm red macule on the right knee, erosion or early cherry angioma   - Mild erythema on the lateral chin    - No other lesions of concern on areas examined.     Impression/Plan:  1. Family hx of melanoma     Recommend sunscreens SPF #30 or greater, protective clothing and avoidance of tanning beds.    2. Hx of DN    Due for skin exam in 4     3. Rosacea, Et type - cheeks and nose -improved    Start Rhofade - apply pea sized amount once daily in the morning     Recommend using sunscreens containing zinc oxide or titanium dioxide, Neutrogena or Cerave    Pt is slightly tan, will not do PDL today. Follow up in the fall for treatment     Recommend visits with the eye doctor    4.  Mild erythema on the lateral chin consistent with perioral derm    Hold the rhofade on this area     Pt will continue Metronidazole and call if she needs a refill     5. Red macule, 1 mm red macule on the right knee, consistent with a cherry angioma on knee    Recheck at the next visit     Recommend pt stop scratching, call if changes      Follow-up in 3 months for full skin exam with Lynn Gill MD.       Staff Involved:  Resident(Bronson Shipley)/Staff(as above)  Scribe/Staff    Scribe Disclosure  I, Alyssa Rayo, am serving as a scribe to document services personally performed by Dr. Dinorah Burt MD, based on data collection and the provider's statements to me.     Provider Disclosure:   The documentation recorded by the scribe accurately reflects the services I personally performed and the decisions made by me.    Dinorah Burt MD    Department of Dermatology  [facility]: Phone: [phone], Fax:[phone]  [facility] [place]:  Phone: [phone], Fax: [phone]

## 2019-06-19 NOTE — PATIENT INSTRUCTIONS
Zinc oxide, titanium dioxide  Neutrogena   CeraVe  Skinceuticals   La roche posay- $30-40    Avobenzone, Octocrylene,-Araujo ingredients          .

## 2019-06-19 NOTE — NURSING NOTE
Dermatology Rooming Note    Lindsay Payne's goals for this visit include:   Chief Complaint   Patient presents with     Laser Treatment     Lindsay is her today for a PDL treatment.      HALEY Lopez

## 2019-06-19 NOTE — PROGRESS NOTES
[facility] Dermatology Note    Dermatology Problem List:  1. Family history of melanoma   2. 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 Rosacea: metrogel  - s/p PDL 5/9/18, 11/21/2018   4. Spider angioma, nasal bridge  - s/p PDL 5/9/18, 11/21/2018   5. History of nevus excision left breast in [facility], non-dysplastic per patient  6. Perioral derm. Metrogel    Encounter Date: Jun 19, 2019    CC:   Chief Complaint   Patient presents with     Laser Treatment     Lindsay is her today for a PDL treatment.      History of Present Illness:  Ms. Lindsay Payne is a 38 year old female who presents as a follow-up for rosacea. The patient was last seen 11/21/2018 when she received a PDL treatment on her cheeks and nose. Metronidazole gel was continued and Differin was started at bedtime.    Since then, the patient states she stopped her Metronidazole in the early Spring and is also only using the Differin cream on the weekends as it makes her face red.     Today, she reports raised skin and roughness on her chin. She also reports of a red spot on her knee that she continually scratches. She only notices it after she shaves her leg.       Past Medical History:   Patient Active Problem List   Diagnosis     CARDIOVASCULAR SCREENING; LDL GOAL LESS THAN 160     Paraguard intrauterine device     Gastroesophageal reflux disease, esophagitis presence not specified     Past Medical History:   Diagnosis Date     NO ACTIVE PROBLEMS      Past Surgical History:   Procedure Laterality Date     APPENDECTOMY  1994     Social History:   reports that she has never smoked. She has never used smokeless tobacco. She reports that she drinks alcohol. She reports that she does not use drugs.    Family History:  Family History   Problem Relation Age of Onset     Cancer Mother         malignant melanoma     Arthritis Father         Bekhterev's disease-spinal suffication      Glaucoma Maternal Grandmother      Medications:  Current Outpatient Medications   Medication Sig Dispense Refill     montelukast (SINGULAIR) 10 MG tablet Take 1 tablet (10 mg) by mouth At Bedtime 90 tablet 2     omeprazole (PRILOSEC) 20 MG CR capsule Take 1 capsule (20 mg) by mouth daily 90 capsule 1     polyethylene glycol (MIRALAX) powder Take 17 g (1 capful) by mouth daily 510 g 1      Allergies   Allergen Reactions     Pnv [Prenatal Vit K-Pb-Rlqjoholu-Fa] Itching and Rash     Review of Systems:     -OB: Pt is not pregnant or breast feeding     Physical exam:  Vitals: There were no vitals taken for this visit.  GEN: This is a well developed, well-nourished female in no acute distress, in a pleasant mood.    SKIN: Focused examination of the face was performed.  - Slightly tan skin   - 1 mm red macule on the right knee, erosion or early cherry angioma   - Mild erythema on the lateral chin    - No other lesions of concern on areas examined.     Impression/Plan:  1. Family hx of melanoma     Recommend sunscreens SPF #30 or greater, protective clothing and avoidance of tanning beds.    2. Hx of DN    Due for skin exam in 4     3. Rosacea, Et type - cheeks and nose -improved    Start Rhofade - apply pea sized amount once daily in the morning     Recommend using sunscreens containing zinc oxide or titanium dioxide, Neutrogena or Cerave    Pt is slightly tan, will not do PDL today. Follow up in the fall for treatment     Recommend visits with the eye doctor    4.  Mild erythema on the lateral chin consistent with perioral derm    Hold the rhofade on this area     Pt will continue Metronidazole and call if she needs a refill     5. Red macule, 1 mm red macule on the right knee, consistent with a cherry angioma on knee    Recheck at the next visit     Recommend pt stop scratching, call if changes      Follow-up in 3 months for full skin exam with Lynn Gill MD.       Staff Involved:  Resident(Bronson Shipley)/Staff(as  above)  Scribe/Staff    Scribe Disclosure  I, Alyssa Perri, am serving as a scribe to document services personally performed by Dr. Dinorah Burt MD, based on data collection and the provider's statements to me.     Provider Disclosure:   The documentation recorded by the scribe accurately reflects the services I personally performed and the decisions made by me.    Dinorah Burt MD    Department of Dermatology  [facility]: Phone: [phone], Fax:[phone]  [facility]: Phone: [phone], Fax: [phone]

## 2019-06-21 ENCOUNTER — TELEPHONE (OUTPATIENT)
Dept: DERMATOLOGY | Facility: CLINIC | Age: 39
End: 2019-06-21

## 2019-06-24 NOTE — TELEPHONE ENCOUNTER
PA Initiation    Medication: oxymetazoline HCl (RHOFADE) 1 % cream  Insurance Company: [employer] - Phone [phone] Fax [phone]  Pharmacy Filling the Rx: [id number] - [place], [place] - [address] AT [facility]  Filling Pharmacy Phone: [phone]  Filling Pharmacy Fax:    Start Date: 6/24/2019

## 2019-06-24 NOTE — TELEPHONE ENCOUNTER
PRIOR AUTHORIZATION DENIED    Medication: oxymetazoline HCl (RHOFADE) 1 % cream - denied/EXCLUDED    Denial Date: 6/24/2019    Denial Rational: script is denied because requested medication is a drug exclusion                    Appeal Information:

## 2019-10-15 ENCOUNTER — OFFICE VISIT (OUTPATIENT)
Dept: DERMATOLOGY | Facility: CLINIC | Age: 39
End: 2019-10-15
Payer: COMMERCIAL

## 2019-10-15 DIAGNOSIS — Z80.8 FAMILY HISTORY OF MALIGNANT MELANOMA: ICD-10-CM

## 2019-10-15 DIAGNOSIS — B07.9 VERRUCA VULGARIS: ICD-10-CM

## 2019-10-15 DIAGNOSIS — Z86.018 HISTORY OF DYSPLASTIC NEVUS: ICD-10-CM

## 2019-10-15 DIAGNOSIS — D22.9 MULTIPLE BENIGN NEVI: Primary | ICD-10-CM

## 2019-10-15 DIAGNOSIS — L71.9 ROSACEA: ICD-10-CM

## 2019-10-15 ASSESSMENT — PAIN SCALES - GENERAL: PAINLEVEL: NO PAIN (0)

## 2019-10-15 NOTE — LETTER
10/15/2019       RE: Lindsay Payne  [address] [place] [address]     Dear Colleague,    Thank you for referring your patient, Lindsay Payne, to the [facility] DERMATOLOGY at [facility]. Please see a copy of my visit note below.    [facility] Dermatology Note      Dermatology Problem List:  1. Family history of melanoma   2. 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 Rosacea: metrogel, rhofade   - s/p PDL 5/9/18, 11/21/18   4. Spider angioma, nasal bridge  - s/p PDL 5/9/18, 11/21/18   5. Perioral dermatitis - Metrogel  6. Benign bx:  - History of benign lesion biopsied on back per report  - History of nevus excision left breast in [facility], non-dysplastic per patient  - Nevus pigmentosus, compound, left inferior breast, bx 4/11/14  - Lentigo, left breast, bx 8/7/2015    Encounter Date: Oct 15, 2019    CC:  Chief Complaint   Patient presents with     Derm Problem     Caro, is being seen today for a skin check, no areas of concern, as reported by patient.         History of Present Illness:  Ms. Lindsay Payne is a 38 year old female with history of dysplastic nevi who presents today for a skin exam. The patient was last seen by Dr. Burt on 6/19/19 when she started rhofade 1% cream for rosacea. Today she reports a mole she wants checked on her forehead. She says it has maintained the same raised appearance, however it has recent history of bleeding.     She also reports a wart on her left thumb. This has been frozen many times in the past by her PCP. She has also tried at home salicylic acid solution but struggles keeping these secured. She has also tried Aldara.    She denies personal history of skin cancer. Her mother had melanoma. She denies prior use of tanning beds. Denies any other bleeding, painful, nonhealing lesions. The patient is otherwise feeling well. There are no  other skin concerns at this time.      Past Medical History:   Patient Active Problem List   Diagnosis     CARDIOVASCULAR SCREENING; LDL GOAL LESS THAN 160     Paraguard intrauterine device     Gastroesophageal reflux disease, esophagitis presence not specified     Past Medical History:   Diagnosis Date     NO ACTIVE PROBLEMS      Past Surgical History:   Procedure Laterality Date     APPENDECTOMY  1994       Social History:  Patient reports that she has never smoked. She has never used smokeless tobacco. She reports current alcohol use. She reports that she does not use drugs.    Family History:  Family History   Problem Relation Age of Onset     Cancer Mother         malignant melanoma     Arthritis Father         Bekhterev's disease-spinal suffication     Glaucoma Maternal Grandmother        Medications:  Current Outpatient Medications   Medication Sig Dispense Refill     metroNIDAZOLE (METROGEL) 0.75 % external gel Apply once daily to the chin 45 g 11     montelukast (SINGULAIR) 10 MG tablet Take 1 tablet (10 mg) by mouth At Bedtime 90 tablet 2     omeprazole (PRILOSEC) 20 MG CR capsule Take 1 capsule (20 mg) by mouth daily 90 capsule 1     oxymetazoline HCl (RHOFADE) 1 % cream Apply a pea-sized amount once daily to the entire face or affected area avoiding the eyes and lips. Wash hands immediately after applying. 30 g 3     polyethylene glycol (MIRALAX) powder Take 17 g (1 capful) by mouth daily 510 g 1       Allergies   Allergen Reactions     Pnv [Prenatal Vit Z-Ur-Rgtdgmayr-Fa] Itching and Rash       Review of Systems:  -Skin Establ Pt: The patient denies any new rash, pruritus, or lesions that are symptomatic, changing or bleeding, except as per HPI.  -Constitutional: The patient is feeling generally well.    Physical exam:  GEN: This is a well developed, well-nourished female in no acute distress, in a pleasant mood.    SKIN: Total skin excluding the undergarment areas was performed. The exam included the  head/face, neck, both arms, chest, back, abdomen, both legs, digits and/or nails.   - 2 mm flesh colored papule on the glabella. Yellowish lacunae on dermatoscopy.  - Well healed scar with pigment recurrence on the right mid back.   - Yellowish hyperkeratotic papule on the left 1st finger.   - Multiple regular brown pigmented macules and papules are identified on the trunk and extremities.   - Erythema and telangiectasias of bilateral cheeks.    - No other lesions of concern on areas examined.     Impression/Plan:  1. History of dysplastic nevi and family history of melanoma. No concerning lesions today. There is pigment recurrence in a biopsy scar but she reports this was not a dysplastic nevus. See plan for this as below.    Continue periodic skin exams and report of new or changing lesions.    Recommend sunscreens SPF #30 or greater, protective clothing and avoidance of tanning beds.    2. Healed biopsy site with pigment recurrence, R mid-back. At site of benign biopsy per patient.     Photodocumentation taken today.     Continue to monitor at next visit.     3. Verruca vulgaris - left 1st finger. Natural history and etiology discussed.     Lesion was pared prior to LN2.  Cryotherapy procedure note: After verbal consent and discussion of risks and benefits including but no limited to dyspigmentation/scar, blister, and pain, 1 was treated with 1-2mm freeze border for 2 cycles with liquid nitrogen. Post cryotherapy instructions were provided.   Continue sal acid pads at home. Pt will call if she wants imiquimod.     4. Rosacea - cheeks and nose      Continue rhofade 1% cream once daily.     Patient will schedule with Dr. Burt for PDL if desired.     5. Benign findings: sebaceous hyperplasia, clinically benign nevi   No further intervention required. Patient to report changes.   Patient reassured of the benign nature of these lesions.    Follow-up in 1 year for skin exam, earlier for new or changing lesions.        Staff Involved:    Scribe Disclosure  I, Ruben Stevie Faye, am serving as a scribe to document services personally performed by Dr. Diann Mac, based on data collection and the provider's statements to me.     Provider Disclosure:   The documentation recorded by the scribe accurately reflects the services I personally performed and the decisions made by me.    Diann Mac MD    Department of Dermatology  [facility]: Phone: [phone], Fax: [phone]

## 2019-10-15 NOTE — NURSING NOTE
Dermatology Rooming Note    Lindsay Payne's goals for this visit include:   Chief Complaint   Patient presents with     Derm Problem     Caro, is being seen today for a skin check, no areas of concern, as reported by patient.     Nathalie Sanon LPN

## 2019-10-15 NOTE — PATIENT INSTRUCTIONS
Skin looks good today!  We will take photo of your back.    For your wart, try the salicylic acid or Aldara. Let me know if you need refills.    Dr. May Song and Dr. Dinorah Burt for PDL    Return in 1 year, sooner if concerns.       Cryotherapy    What is it?    Use of a very cold liquid, such as liquid nitrogen, to freeze and destroy abnormal skin cells that need to be removed    What should I expect?    Tenderness and redness    A small blister that might grow and fill with dark purple blood. There may be crusting.    More than one treatment may be needed if the lesions do not go away.    How do I care for the treated area?    Gently wash the area with your hands when bathing.    Use a thin layer of Vaseline to help with healing. You may use a Band-Aid.     The area should heal within 7-10 days and may leave behind a pink or lighter color.     Do not use an antibiotic or Neosporin ointment.     You may take acetaminophen (Tylenol) for pain.     Call your Doctor if you have:    Severe pain    Signs of infection (warmth, redness, cloudy yellow drainage, and or a bad smell)    Questions or concerns    Who should I call with questions?       [facility]: [phone]       [facility]: [phone]       For urgent needs outside of business hours call the [facility] at [phone]        and ask for the dermatology resident on call

## 2019-10-15 NOTE — PROGRESS NOTES
[facility] Dermatology Note      Dermatology Problem List:  1. Family history of melanoma   2. 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 Rosacea: metrogel, rhofade   - s/p PDL 5/9/18, 11/21/18   4. Spider angioma, nasal bridge  - s/p PDL 5/9/18, 11/21/18   5. Perioral dermatitis - Metrogel  6. Benign bx:  - History of benign lesion biopsied on back per report  - History of nevus excision left breast in [place], non-dysplastic per patient  - Nevus pigmentosus, compound, left inferior breast, bx 4/11/14  - Lentigo, left breast, bx 8/7/2015    Encounter Date: Oct 15, 2019    CC:  Chief Complaint   Patient presents with     Derm Problem     Caro, is being seen today for a skin check, no areas of concern, as reported by patient.         History of Present Illness:  Ms. Lindsay Payne is a 38 year old female with history of dysplastic nevi who presents today for a skin exam. The patient was last seen by Dr. Burt on 6/19/19 when she started rhofade 1% cream for rosacea. Today she reports a mole she wants checked on her forehead. She says it has maintained the same raised appearance, however it has recent history of bleeding.     She also reports a wart on her left thumb. This has been frozen many times in the past by her PCP. She has also tried at home salicylic acid solution but struggles keeping these secured. She has also tried Aldara.    She denies personal history of skin cancer. Her mother had melanoma. She denies prior use of tanning beds. Denies any other bleeding, painful, nonhealing lesions. The patient is otherwise feeling well. There are no other skin concerns at this time.      Past Medical History:   Patient Active Problem List   Diagnosis     CARDIOVASCULAR SCREENING; LDL GOAL LESS THAN 160     Paraguard intrauterine device     Gastroesophageal reflux disease, esophagitis presence not specified     Past Medical  History:   Diagnosis Date     NO ACTIVE PROBLEMS      Past Surgical History:   Procedure Laterality Date     APPENDECTOMY  1994       Social History:  Patient reports that she has never smoked. She has never used smokeless tobacco. She reports current alcohol use. She reports that she does not use drugs.    Family History:  Family History   Problem Relation Age of Onset     Cancer Mother         malignant melanoma     Arthritis Father         Bekhterev's disease-spinal suffication     Glaucoma Maternal Grandmother        Medications:  Current Outpatient Medications   Medication Sig Dispense Refill     metroNIDAZOLE (METROGEL) 0.75 % external gel Apply once daily to the chin 45 g 11     montelukast (SINGULAIR) 10 MG tablet Take 1 tablet (10 mg) by mouth At Bedtime 90 tablet 2     omeprazole (PRILOSEC) 20 MG CR capsule Take 1 capsule (20 mg) by mouth daily 90 capsule 1     oxymetazoline HCl (RHOFADE) 1 % cream Apply a pea-sized amount once daily to the entire face or affected area avoiding the eyes and lips. Wash hands immediately after applying. 30 g 3     polyethylene glycol (MIRALAX) powder Take 17 g (1 capful) by mouth daily 510 g 1       Allergies   Allergen Reactions     Pnv [Prenatal Vit E-Gv-Kuezuhsoo-Fa] Itching and Rash       Review of Systems:  -Skin Establ Pt: The patient denies any new rash, pruritus, or lesions that are symptomatic, changing or bleeding, except as per HPI.  -Constitutional: The patient is feeling generally well.    Physical exam:  GEN: This is a well developed, well-nourished female in no acute distress, in a pleasant mood.    SKIN: Total skin excluding the undergarment areas was performed. The exam included the head/face, neck, both arms, chest, back, abdomen, both legs, digits and/or nails.   - 2 mm flesh colored papule on the glabella. Yellowish lacunae on dermatoscopy.  - Well healed scar with pigment recurrence on the right mid back.   - Yellowish hyperkeratotic papule on the left  1st finger.   - Multiple regular brown pigmented macules and papules are identified on the trunk and extremities.   - Erythema and telangiectasias of bilateral cheeks.    - No other lesions of concern on areas examined.     Impression/Plan:  1. History of dysplastic nevi and family history of melanoma. No concerning lesions today. There is pigment recurrence in a biopsy scar but she reports this was not a dysplastic nevus. See plan for this as below.    Continue periodic skin exams and report of new or changing lesions.    Recommend sunscreens SPF #30 or greater, protective clothing and avoidance of tanning beds.    2. Healed biopsy site with pigment recurrence, R mid-back. At site of benign biopsy per patient.     Photodocumentation taken today.     Continue to monitor at next visit.     3. Verruca vulgaris - left 1st finger. Natural history and etiology discussed.     Lesion was pared prior to LN2.  Cryotherapy procedure note: After verbal consent and discussion of risks and benefits including but no limited to dyspigmentation/scar, blister, and pain, 1 was treated with 1-2mm freeze border for 2 cycles with liquid nitrogen. Post cryotherapy instructions were provided.   Continue sal acid pads at home. Pt will call if she wants imiquimod.     4. Rosacea - cheeks and nose      Continue rhofade 1% cream once daily.     Patient will schedule with Dr. Burt for PDL if desired.     5. Benign findings: sebaceous hyperplasia, clinically benign nevi   No further intervention required. Patient to report changes.   Patient reassured of the benign nature of these lesions.    Follow-up in 1 year for skin exam, earlier for new or changing lesions.       Staff Involved:    Scribe Disclosure  I, Ruben Faye, am serving as a scribe to document services personally performed by Dr. Diann Mac, based on data collection and the provider's statements to me.     Provider Disclosure:   The documentation recorded by the  scribe accurately reflects the services I personally performed and the decisions made by me.    Diann Mac MD    Department of Dermatology  [facility]: Phone: [phone], Fax: [phone]

## 2019-10-18 ENCOUNTER — OFFICE VISIT (OUTPATIENT)
Dept: MIDWIFE SERVICES | Facility: CLINIC | Age: 39
End: 2019-10-18
Payer: COMMERCIAL

## 2019-10-18 VITALS
SYSTOLIC BLOOD PRESSURE: 116 MMHG | TEMPERATURE: 96.8 F | HEART RATE: 75 BPM | DIASTOLIC BLOOD PRESSURE: 75 MMHG | WEIGHT: 155 LBS | BODY MASS INDEX: 24.44 KG/M2

## 2019-10-18 DIAGNOSIS — R30.0 DYSURIA: ICD-10-CM

## 2019-10-18 DIAGNOSIS — N89.8 VAGINAL IRRITATION: Primary | ICD-10-CM

## 2019-10-18 DIAGNOSIS — Z30.431 IUD CHECK UP: ICD-10-CM

## 2019-10-18 LAB
ALBUMIN UR-MCNC: NEGATIVE MG/DL
APPEARANCE UR: CLEAR
BACTERIA #/AREA URNS HPF: ABNORMAL /HPF
BILIRUB UR QL STRIP: NEGATIVE
COLOR UR AUTO: YELLOW
GLUCOSE UR STRIP-MCNC: NEGATIVE MG/DL
HGB UR QL STRIP: ABNORMAL
KETONES UR STRIP-MCNC: NEGATIVE MG/DL
LEUKOCYTE ESTERASE UR QL STRIP: NEGATIVE
NITRATE UR QL: NEGATIVE
NON-SQ EPI CELLS #/AREA URNS LPF: ABNORMAL /LPF
PH UR STRIP: 5.5 PH (ref 5–7)
RBC #/AREA URNS AUTO: ABNORMAL /HPF
SOURCE: ABNORMAL
SP GR UR STRIP: 1.01 (ref 1–1.03)
SPECIMEN SOURCE: NORMAL
UROBILINOGEN UR STRIP-ACNC: 0.2 EU/DL (ref 0.2–1)
WBC #/AREA URNS AUTO: ABNORMAL /HPF
WET PREP SPEC: NORMAL

## 2019-10-18 PROCEDURE — 81001 URINALYSIS AUTO W/SCOPE: CPT | Performed by: ADVANCED PRACTICE MIDWIFE

## 2019-10-18 PROCEDURE — 87086 URINE CULTURE/COLONY COUNT: CPT | Performed by: ADVANCED PRACTICE MIDWIFE

## 2019-10-18 PROCEDURE — 87210 SMEAR WET MOUNT SALINE/INK: CPT | Performed by: ADVANCED PRACTICE MIDWIFE

## 2019-10-18 PROCEDURE — 99213 OFFICE O/P EST LOW 20 MIN: CPT | Performed by: ADVANCED PRACTICE MIDWIFE

## 2019-10-18 NOTE — PROGRESS NOTES
S:  Lindsay Payne is 38 year old  who presents today for evaluation of possible yeast infection and IUD check. She reports for the past few weeks she suddenly started to have discomfort in her pelvic area. She noticed discomfort with touching her genital area when showering, sitting sometimes also causes pain, worse after working out, bloating, vaginal irritation, discharge, and some bleeding (needing wear a liner). She thought she was having a yeast infection although her symptoms are a little different than her typical yeast infection. She treated with an OTC 3 day monostat with no improvement of her symptoms. She called wondering if her IUD was maybe out of place. Has not felt for the strings. She declines GC/CT testing. Only with her  and has no reason to think he is not faithful to her. Unsure about urinary symptoms. Agrees to have a urine test in case. Has some pain in her lower right abdomen. No concerns about bowel function, no diarrhea or constipation. We discussed best way to tell if the IUD is in place is via ultrasound. Will see what testing shows before moving towards that. Wants to make sure things are okay before heading to [place] in a few weeks. No other questions or concerns today.     O:  /75   Pulse 75   Temp 96.8  F (36  C) (Oral)   Wt 70.3 kg (155 lb)   BMI 24.44 kg/m    Patient is well.   Pelvic exam: normal vagina and vulva, vaginal discharge described as white. No visible lesions. IUD strings visible at os. No IUD visible.     O:  IUD check   Rule out yeast infection     P:  (N89.8) Vaginal irritation  (primary encounter diagnosis)  Plan: Wet prep, Urine Microscopic    (R30.0) Dysuria  Plan: UA reflex to Microscopic, Urine Culture Aerobic        Bacterial    Discussed with patient negative wet prep results. Offered patient ultrasound for IUD check. She declined at this time. Will wait to see how things go and will mychart me if she decides to get the ultrasound done.      Nathalie Celaya, CHRISTINA BRAYM

## 2019-10-19 LAB
BACTERIA SPEC CULT: NORMAL
SPECIMEN SOURCE: NORMAL

## 2019-10-28 ENCOUNTER — OFFICE VISIT (OUTPATIENT)
Dept: FAMILY MEDICINE | Facility: CLINIC | Age: 39
End: 2019-10-28
Payer: COMMERCIAL

## 2019-10-28 VITALS — SYSTOLIC BLOOD PRESSURE: 113 MMHG | TEMPERATURE: 98.6 F | DIASTOLIC BLOOD PRESSURE: 75 MMHG

## 2019-10-28 DIAGNOSIS — Z71.84 TRAVEL ADVICE ENCOUNTER: Primary | ICD-10-CM

## 2019-10-28 PROCEDURE — 90632 HEPA VACCINE ADULT IM: CPT | Mod: GA | Performed by: NURSE PRACTITIONER

## 2019-10-28 PROCEDURE — 90472 IMMUNIZATION ADMIN EACH ADD: CPT | Mod: GA | Performed by: NURSE PRACTITIONER

## 2019-10-28 PROCEDURE — 99401 PREV MED CNSL INDIV APPRX 15: CPT | Mod: 25 | Performed by: NURSE PRACTITIONER

## 2019-10-28 PROCEDURE — 90746 HEPB VACCINE 3 DOSE ADULT IM: CPT | Mod: GA | Performed by: NURSE PRACTITIONER

## 2019-10-28 PROCEDURE — 90471 IMMUNIZATION ADMIN: CPT | Mod: GA | Performed by: NURSE PRACTITIONER

## 2019-10-28 PROCEDURE — 90691 TYPHOID VACCINE IM: CPT | Mod: GA | Performed by: NURSE PRACTITIONER

## 2019-10-28 RX ORDER — ATOVAQUONE AND PROGUANIL HYDROCHLORIDE 250; 100 MG/1; MG/1
1 TABLET, FILM COATED ORAL DAILY
Qty: 25 TABLET | Refills: 0 | Status: SHIPPED | OUTPATIENT
Start: 2019-10-28 | End: 2021-11-12

## 2019-10-28 RX ORDER — AZITHROMYCIN 500 MG/1
500 TABLET, FILM COATED ORAL DAILY
Qty: 3 TABLET | Refills: 0 | Status: SHIPPED | OUTPATIENT
Start: 2019-10-28 | End: 2019-11-05

## 2019-10-28 NOTE — PATIENT INSTRUCTIONS
Today October 28, 2019 you received the    Hepatitis A Vaccine -     Hepatitis B Vaccine - Please return on 11/27/19 for your 2nd dose and 4/25/20 or later for your 3rd and final dose.    Typhoid - injectable. This vaccine is valid for two years.   .    These appointments can be made as a NURSE ONLY visit.    **It is very important for the vaccinations to be given on the scheduled day(s), this helps ensure you receive the full effectiveness of the vaccine.**    Please call [facility] with any questions [phone]    Thank you for visiting [facility]

## 2019-10-28 NOTE — NURSING NOTE
"Chief Complaint   Patient presents with     [facility]     initial /75   Temp 98.6  F (37  C) (Oral)  Estimated body mass index is 24.44 kg/m  as calculated from the following:    Height as of 5/30/19: 1.696 m (5' 6.77\").    Weight as of 10/18/19: 70.3 kg (155 lb).  BP completed using cuff size: regular.  L  arm      Health Maintenance that is potentially due pending provider review:  NONE    n/a    [place] King ma  "

## 2019-10-28 NOTE — PROGRESS NOTES
Nurse Note      Itinerary:  [place] and [facility]      Departure Date: 11/15/19      Return Date: 12/1/19      Length of Trip 2 weeks      Reason for Travel: Tourism           Urban or rural: both      Accommodations: Hotel    Family home    Private dwelling        IMMUNIZATION HISTORY  Have you received any immunizations within the past 4 weeks?  Yes  Have you ever fainted from having your blood drawn or from an injection?  No  Have you ever had a fever reaction to vaccination?  No  Have you ever had any bad reaction or side effect from any vaccination?  No  Have you ever had hepatitis A or B vaccine?  Yes  Do you live (or work closely) with anyone who has AIDS, an AIDS-like condition, any other immune disorder or who is on chemotherapy for cancer?  No  Do you have a family history of immunodeficiency?  No  Have you received any injection of immune globulin or any blood products during the past 12 months?  No    Patient roomed by Richmond Hernandezna SUMAN Elmer is a 38 year old female seen today with family members for counsultation for international travel to the stated countries.   Patient will be departing in  2+ week(s) and  traveling with family member(s).      Patient itinerary :  will be in the urban region of  [facility] for about 3 days then flight to [facility] and will be in rural [facility])  which presents risk for Malaria, Dengue Fever, Chikungungya, food borne illnesses and motor vehicle accidents. exposure.      Patient's activities will include sightseeing and visiting friends and relatives.    Patient's country of birth is [facility] [place]    Special medical concerns: none  Pre-travel questionnaire was completed by patient and reviewed by provider.       Vitals: /75   Temp 98.6  F (37  C) (Oral)   BMI= There is no height or weight on file to calculate BMI.    EXAM:  General:  Well-nourished, well-developed in no acute distress.  Appears to be stated age,  interacts appropriately and expresses understanding of information given to patient.    Current Outpatient Medications   Medication Sig Dispense Refill     metroNIDAZOLE (METROGEL) 0.75 % external gel Apply once daily to the chin (Patient not taking: Reported on 10/15/2019) 45 g 11     montelukast (SINGULAIR) 10 MG tablet Take 1 tablet (10 mg) by mouth At Bedtime (Patient not taking: Reported on 10/15/2019) 90 tablet 2     omeprazole (PRILOSEC) 20 MG CR capsule Take 1 capsule (20 mg) by mouth daily 90 capsule 1     oxymetazoline HCl (RHOFADE) 1 % cream Apply a pea-sized amount once daily to the entire face or affected area avoiding the eyes and lips. Wash hands immediately after applying. (Patient not taking: Reported on 10/15/2019) 30 g 3     polyethylene glycol (MIRALAX) powder Take 17 g (1 capful) by mouth daily 510 g 1     Patient Active Problem List   Diagnosis     CARDIOVASCULAR SCREENING; LDL GOAL LESS THAN 160     Paraguard intrauterine device     Gastroesophageal reflux disease, esophagitis presence not specified     Allergies   Allergen Reactions     Pnv [Prenatal Vit U-Sc-Nfoyaplbi-Fa] Itching and Rash         Immunizations discussed include:   Hepatitis A:  Ordered/given today, risks, benefits and side effects reviewed  Hepatitis B: Ordered/given today, risks, benefits and side effects reviewed  Influenza: Up to date  Typhoid: Up to date  Rabies: Declined  reviewed managment of a animal bite or scratch (washing wound, seek medical care within 24 hours for post exposure prophylaxis )  Yellow Fever: Not indicated  Japanese Encephalitis: Not indicated - risk of disease is minimal off season  Meningococcus: Not indicated  Tetanus/Diphtheria: Up to date  Measles/Mumps/Rubella: Up to date  Cholera: Not needed  Polio: Up to date  Pneumococcal: Under age of 65  Varicella: Up to date  Zostavax:  Not indicated  Shingrix: Not indicated  HPV:  Not indicated  TB:  Low risk    Altitude Exposure on this trip:  no  Past  tolerance to Altitude: na    ASSESSMENT/PLAN:    ICD-10-CM    1. Travel advice encounter Z71.84 azithromycin (ZITHROMAX) 500 MG tablet     atovaquone-proguanil (MALARONE) 250-100 MG tablet     I have reviewed general recommendations for safe travel   including: food/water precautions, insect precautions, safer sex   practices given high prevalence of Zika, HIV and other STDs,   roadway safety. Educational materials and [employer] report provided.    Malaraia prophylaxis recommended: Malarone  Symptomatic treatment for traveler's diarrhea: azithromycin  Altitude illness prevention and treatment: none      Evacuation insurance advised and resources were provided to patient.    Total visit time 15 minutes  with over 50% of time spent counseling patient as detailed above.    Ruma Esparza CNP

## 2019-11-05 ENCOUNTER — OFFICE VISIT (OUTPATIENT)
Dept: OBGYN | Facility: CLINIC | Age: 39
End: 2019-11-05
Payer: COMMERCIAL

## 2019-11-05 VITALS
TEMPERATURE: 97.6 F | DIASTOLIC BLOOD PRESSURE: 77 MMHG | WEIGHT: 154.7 LBS | BODY MASS INDEX: 24.4 KG/M2 | SYSTOLIC BLOOD PRESSURE: 115 MMHG | HEART RATE: 83 BPM

## 2019-11-05 DIAGNOSIS — R35.0 URINARY FREQUENCY: Primary | ICD-10-CM

## 2019-11-05 DIAGNOSIS — M79.18 MYALGIA OF PELVIC FLOOR: ICD-10-CM

## 2019-11-05 LAB
ALBUMIN UR-MCNC: NEGATIVE MG/DL
APPEARANCE UR: CLEAR
BILIRUB UR QL STRIP: NEGATIVE
COLOR UR AUTO: YELLOW
GLUCOSE UR STRIP-MCNC: NEGATIVE MG/DL
HGB UR QL STRIP: NEGATIVE
KETONES UR STRIP-MCNC: NEGATIVE MG/DL
LEUKOCYTE ESTERASE UR QL STRIP: NEGATIVE
NITRATE UR QL: NEGATIVE
PH UR STRIP: 5.5 PH (ref 5–7)
SOURCE: NORMAL
SP GR UR STRIP: <=1.005 (ref 1–1.03)
SPECIMEN SOURCE: NORMAL
UROBILINOGEN UR STRIP-ACNC: 0.2 EU/DL (ref 0.2–1)
WET PREP SPEC: NORMAL

## 2019-11-05 PROCEDURE — 81003 URINALYSIS AUTO W/O SCOPE: CPT | Performed by: OBSTETRICS & GYNECOLOGY

## 2019-11-05 PROCEDURE — 87591 N.GONORRHOEAE DNA AMP PROB: CPT | Performed by: OBSTETRICS & GYNECOLOGY

## 2019-11-05 PROCEDURE — 99213 OFFICE O/P EST LOW 20 MIN: CPT | Performed by: OBSTETRICS & GYNECOLOGY

## 2019-11-05 PROCEDURE — 87491 CHLMYD TRACH DNA AMP PROBE: CPT | Performed by: OBSTETRICS & GYNECOLOGY

## 2019-11-05 PROCEDURE — 87210 SMEAR WET MOUNT SALINE/INK: CPT | Performed by: OBSTETRICS & GYNECOLOGY

## 2019-11-05 NOTE — PROGRESS NOTES
CC: urinary frequency    HPI: Lindsay Payne is a 39 year old  here with some questions.  End of September, her father had a heart attack and open heart surgery.  Around that time, she had symptoms of a yeast infection.  She ended up taking an OTC 3 day monistat that took about 2-3 days to improve, but it did go away.  During the 3 days window between completing the treatment and feeling better, she saw, susanna mahmood CNM and had a negative UA and wet prep.  She was advised to have STD testing, but Caro was taken aback and declined the test.  She reports that since her father had a heart attack she and her  have been taking care of him, along with their 2 children.  She reports that they haven't even had sex since prior to the MI.  Since her visit 2 weeks ago she has been concerned about the STD, worried maybe she should have done the test.  She feels confident her  has not cheated, but it is affecting her sleep.  She talked to him about it and he encouraged her to have it done so she won't be thinking about any more.      In addition, yesterday she had a hard workout and noticed some urinary frequency and pelvic discomfort.  No increased itching, discharge or odor. No dysuria or hematuria.    ROS:  C: NEGATIVE for fever, chills, change in weight  I: NEGATIVE for worrisome rashes, moles or lesions  E: NEGATIVE for vision changes or irritation  E/M: NEGATIVE for ear, mouth and throat problems  R: NEGATIVE for significant cough or SOB  CV: NEGATIVE for chest pain, palpitations or peripheral edema  GI: NEGATIVE for nausea, abdominal pain, heartburn, or change in bowel habits  : NEGATIVE for dysuria, hematuria, vaginal discharge  M: NEGATIVE for significant arthralgias or myalgia  N: NEGATIVE for weakness, dizziness or paresthesias  E: NEGATIVE for temperature intolerance, skin/hair changes  P: NEGATIVE for changes in mood or affect    Past Medical History:   Diagnosis Date     NO ACTIVE  PROBLEMS      Past Surgical History:   Procedure Laterality Date     APPENDECTOMY       OB History    Para Term  AB Living   2 2 2 0 0 2   SAB TAB Ectopic Multiple Live Births   0 0 0 0 2      # Outcome Date GA Lbr Jaron/2nd Weight Sex Delivery Anes PTL Lv   2 Term 09/26/15 40w0d / 00:30 3.43 kg (7 lb 9 oz) M Vag-Spont EPI  AISSATOU      Apgar1: 7  Apgar5: 9   1 Term 11 39w3d 08:55 / 02:24  M Vag-Spont EPI N AISSATOU      Name: NANY,BOY BABY SANG      Apgar1: 9  Apgar5: 9        Allergies   Allergen Reactions     Pnv [Prenatal Vit U-Nr-Vjnoxzudl-Fa] Itching and Rash       Current Outpatient Medications:      levonorgestrel (MIRENA) 20 MCG/24HR IUD, 1 each by Intrauterine route once, Disp: , Rfl:      atovaquone-proguanil (MALARONE) 250-100 MG tablet, Take 1 tablet by mouth daily Start 2 days before exposure to Malaria and continue daily till  7 days after exposure., Disp: 25 tablet, Rfl: 0     montelukast (SINGULAIR) 10 MG tablet, Take 1 tablet (10 mg) by mouth At Bedtime (Patient not taking: Reported on 10/15/2019), Disp: 90 tablet, Rfl: 2     omeprazole (PRILOSEC) 20 MG CR capsule, Take 1 capsule (20 mg) by mouth daily, Disp: 90 capsule, Rfl: 1  Social History     Socioeconomic History     Marital status:      Spouse name: Not on file     Number of children: Not on file     Years of education: Not on file     Highest education level: Not on file   Occupational History     Not on file   Social Needs     Financial resource strain: Not on file     Food insecurity:     Worry: Not on file     Inability: Not on file     Transportation needs:     Medical: Not on file     Non-medical: Not on file   Tobacco Use     Smoking status: Never Smoker     Smokeless tobacco: Never Used   Substance and Sexual Activity     Alcohol use: Yes     Comment: 1-2 drinks per month     Drug use: No     Sexual activity: Yes     Partners: Male     Birth control/protection: I.U.D.   Lifestyle     Physical activity:      Days per week: Not on file     Minutes per session: Not on file     Stress: Not on file   Relationships     Social connections:     Talks on phone: Not on file     Gets together: Not on file     Attends [employer] service: Not on file     Active member of club or organization: Not on file     Attends meetings of clubs or organizations: Not on file     Relationship status: Not on file     Intimate partner violence:     Fear of current or ex partner: Not on file     Emotionally abused: Not on file     Physically abused: Not on file     Forced sexual activity: Not on file   Other Topics Concern     Parent/sibling w/ CABG, MI or angioplasty before 65F 55M? Not Asked   Social History Narrative    Caffeine intake/servings daily - 0    Calcium intake/servings daily - 3    Exercise 3 times weekly - describe jogs, weights    Sunscreen used - Yes    Seatbelts used - Yes    Guns stored in the home - No    Self Breast Exam - Yes    Pap test up to date -  Yes    Eye exam up to date -  Yes    Dental exam up to date -  Yes    DEXA scan up to date -  No    Flex Sig/Colonoscopy up to date -  No    Mammography up to date -  No    Immunizations reviewed and up to date - Yes    Abuse: Current or Past (Physical, Sexual or Emotional) - No    Do you feel safe in your environment - Yes    Do you cope well with stress - Yes    Do you suffer from insomnia - No    Last updated by: Monica Flynn  2/18/2015             Family History   Problem Relation Age of Onset     Cancer Mother         malignant melanoma     Arthritis Father         Bekhterev's disease-spinal suffication     Glaucoma Maternal Grandmother      Past medical, surgical, social and family history were reviewed and updated in EPIC.    PE: /77   Pulse 83   Temp 97.6  F (36.4  C) (Oral)   Wt 70.2 kg (154 lb 11.2 oz)   BMI 24.40 kg/m      Psych: normal affect, appropriate eye contact  Resp: no increased work of breathing  CV: no peripheral edema  Abd; SNT, no  palpable masses  Lymph: no enlarged inquinal nodes  Pelvic: normal vulva and vagina, mild erythema.  Scant clear discharge.  Cervix without lesions or CMT.  Tenderness over left pelvic floor musculature.  Skin: no visible rashes or lesions.    UA; negative  Wet prep: negative    A/P: urinary frequency, pelvic floor myalgia.   I explained that stress is the likely instigator of her symptoms and it is also possible she strained her pelvic floor musculature.  Recommend ibuprofen and avoiding strength trainging this week, ok to run.  If not improving, plan for pelvic floor PT upon return from her trip to the [facility].  Gc/ct sent.    ANDREY ROMERO MD

## 2019-11-05 NOTE — NURSING NOTE
"Chief Complaint   Patient presents with     UTI       Initial /77   Pulse 83   Temp 97.6  F (36.4  C) (Oral)   Wt 70.2 kg (154 lb 11.2 oz)   BMI 24.40 kg/m   Estimated body mass index is 24.4 kg/m  as calculated from the following:    Height as of 19: 1.696 m (5' 6.77\").    Weight as of this encounter: 70.2 kg (154 lb 11.2 oz).  BP completed using cuff size: regular    Questioned patient about current smoking habits.  Pt. has never smoked.          The following HM Due: NONE      The following patient reported/Care Every where data was sent to:  P ABSTRACT QUALITY INITIATIVES [[id number]]        patient has appointment for today  Tangela Stout                "

## 2019-11-06 LAB
C TRACH DNA SPEC QL NAA+PROBE: NEGATIVE
N GONORRHOEA DNA SPEC QL NAA+PROBE: NEGATIVE
SPECIMEN SOURCE: NORMAL
SPECIMEN SOURCE: NORMAL

## 2019-11-08 ENCOUNTER — HEALTH MAINTENANCE LETTER (OUTPATIENT)
Age: 39
End: 2019-11-08

## 2019-11-11 ENCOUNTER — TELEPHONE (OUTPATIENT)
Dept: DERMATOLOGY | Facility: CLINIC | Age: 39
End: 2019-11-11

## 2019-11-11 NOTE — TELEPHONE ENCOUNTER
[facility] Call Center    Phone Message    May a detailed message be left on voicemail: yes    Reason for Call: Other: PT has seen Dinorah Burt for laser treatments and would like to schedule with May Song.  Please follow up with the PT.      Action Taken: Message routed to:  [facility] ([facility]): Derm - cosmetic

## 2019-12-10 NOTE — TELEPHONE ENCOUNTER
[facility] Call Center    Phone Message    May a detailed message be left on voicemail: no    Reason for Call: Other: Pt is returning Jazmyn's call to set up laser treatment; Pt is okay with a call tomorrow.     Action Taken: Message routed to:  [facility] ([facility]): CLINIC COORDINATORS DERM [facility]

## 2019-12-20 ENCOUNTER — MYC MEDICAL ADVICE (OUTPATIENT)
Dept: FAMILY MEDICINE | Facility: CLINIC | Age: 39
End: 2019-12-20

## 2019-12-20 DIAGNOSIS — M54.9 BACK PAIN: Primary | ICD-10-CM

## 2019-12-20 DIAGNOSIS — M62.830 BACK MUSCLE SPASM: ICD-10-CM

## 2019-12-20 RX ORDER — CYCLOBENZAPRINE HCL 5 MG
5 TABLET ORAL 3 TIMES DAILY PRN
Qty: 15 TABLET | Refills: 0 | Status: SHIPPED | OUTPATIENT
Start: 2019-12-20 | End: 2020-04-28

## 2020-01-15 ENCOUNTER — OFFICE VISIT (OUTPATIENT)
Dept: DERMATOLOGY | Facility: CLINIC | Age: 40
End: 2020-01-15
Payer: COMMERCIAL

## 2020-01-15 DIAGNOSIS — L71.9 ROSACEA: ICD-10-CM

## 2020-01-15 DIAGNOSIS — L71.0 PERIORAL DERMATITIS: Primary | ICD-10-CM

## 2020-01-15 RX ORDER — PIMECROLIMUS 10 MG/G
CREAM TOPICAL 2 TIMES DAILY
Qty: 60 G | Refills: 4 | Status: SHIPPED | OUTPATIENT
Start: 2020-01-15 | End: 2021-11-12

## 2020-01-15 ASSESSMENT — PAIN SCALES - GENERAL: PAINLEVEL: NO PAIN (0)

## 2020-01-15 NOTE — PROCEDURES
Laser- VBeam(Pulsed Dye Laser) Procedure Note: Cosmetic      Dermatology Problem List:  1. Family history of melanoma   2. 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 Rosacea: metrogel, rhofade   - s/p PDL 18, 18   4. Spider angioma, nasal bridge  - s/p PDL 18, 18   5. Perioral dermatitis - Metrogel  6. Benign bx:  - History of benign lesion biopsied on back per report  - History of nevus excision left breast in [facility], non-dysplastic per patient  - Nevus pigmentosus, compound, left inferior breast, bx 14  - Lentigo, left breast, bx 2015      Procedure Date: José Antonio 15, 2020    Attending Staff Surgeon: Dr. Dinorah Burt    Resident Surgeon: Dr. Jeffery Pike    Assistant: Kayla Garay [facility]    Operating Room Data:     Surgery/Procedure Date:    SAME     Pre-operative Diagnosis:    Rosacea and spider angioma, improved with previous PDL, cherry angioma  Location: cheeks and nasal ala, left orbital rim    Operation/Procedure    Vbeam pulsed dye laser treatment#: 3      Post-operative Diagnosis:  SAME    Laser Settings: nose (1 pass)  Energy: 7 J/cm2  Spot size:7mm  Pulse width:  1.5 mS (0.45 thru 40 mS)  Dynamic cooling spray settin mS  Dynamic cooling device delay:  20 mS    Laser Settings: cherry angioma, just above the left orbital rim, near brow - lesion was confirmed to be placed over bone before lasering (1 pass) with appropriate protective eyewear in place  Energy: 11 J/cm2  Spot size: 3mm  Pulse width:  1.5 mS (0.45 thru 40 mS)  Dynamic cooling spray settin mS  Dynamic cooling device delay:  20 mS      Laser Settings: cheeks (2 passes on medial cheeks, 1 elsewhere)  Energy:7 J/cm2  Spot size:7mm  Pulse width:  6 mS (0.45 thru 40 mS)  Dynamic cooling spray settin mS  Dynamic cooling device delay:  20 mS        Fotofinder photos: No    Anesthesia:  None    Description of Operation/Procedure:   The  nature and purpose of the procedure, associated risks, possible consequences, complications and alternative methods of treatment were explained in detail, this includes but is not limited to hyperpigmentation, hypopigmentation, scarring, bruising, hair loss pain/discomfort, eye injury, and blister. We reviewed that the outcome could be any of the following: no improvement, slight improvement or change in skin color & texture, the skin might be permanently lighter or darker, and though uncommon, superficial scarring may occur.  Multiple treatments may be recommended.   A photo and operative consent were obtained. Time-out was performed.The patient was positioned to optimally expose the area treated. Protective eyewear was worn by the patient and goggles on all personnel in the treatment room. The patient confirmed the site to be treated. The laser energy output was verified by meter reading.      The clinically evident lesion(s) was/were treated with Shara Vbeam pulsed dye laser (595 nm) beam as above. A total of 118 pulses were used. The patient tolerated the procedure well and no complications were noted. Post operative instructions were provided. The total laser operation and preparation time was 10 minutes.      The patient will follow-up in 4-6 weeks.    The patient will pay the cosmetic fee today.         Staff Involved:  Resident (Shahzad)/Scribe/Staff    Scribe Disclosure  I, Vida Fraga, am serving as a scribe to document services personally performed by Dr. Dinorah Burt MD, based on data collection and the provider's statements to me.    Provider Disclosure:   The documentation recorded by the scribe accurately reflects the services I personally performed and the decisions made by me.    Dinorah Burt MD    Department of Dermatology  [facility]: Phone: [phone], Fax:[phone]  [facility]  Surgery Center: Phone: [phone], Fax: [phone]    Staff Physician Comments:   I saw and evaluated the patient with the resident and I agree with the assessment and plan.  I was present for the key portions of the above major procedure and examination..    Dinorah Burt MD    Department of Dermatology  [facility]: Phone: [phone], Fax:[phone]  [facility]: Phone: [phone], Fax: [phone]

## 2020-01-15 NOTE — PATIENT INSTRUCTIONS
Pulse Dye Laser    I will experience redness, swelling, pain, and heat sensation. I may experience bruising, itching, or acne. Risks are blistering, oozing, permanent scarring, hair loss,  temporary or permanent skin lightening or darkening, infection, and eye injury. I understand my outcome could be no improvement, slight improvement. Multiple treatments may be required.    After treatment, Do Not:    Rub, scratch, or put weight on the site for 2 weeks    Wear tight fitting clothing or jewelry over the site    Brown. Keep the site out of sunlight. Use sunscreen of 30 SPF or greater when in the sun. Use sunscreen 30 minutes before going out and reapply if sweating. Tanning decreases the success of the treatment     How do I care for the treated site?    Use ice packs for 10 minutes after the procedure for swelling     If the site is on your face, use ice again 1 hour after treatment    If a scab or crust forms, gently cleanse the site with hydrogen peroxide. Then put on Vaseline  ointment 3 times a day    Do not use makeup on any open wound    What should I expect?    Blue-gray color that may take 2 to 3 weeks to go away    Redness may also last a week or longer    Results may take up to 3 or 4 months after treatment    More procedures may be needed    Who should I call with questions?    [facility]: [phone]     [facility]: [phone]    For urgent needs outside of business hours call the [facility] at [phone] and ask for the dermatology resident on call

## 2020-01-15 NOTE — NURSING NOTE
Dermatology Rooming Note    Lindsay Payne's goals for this visit include:   Chief Complaint   Patient presents with     Laser Treatment     Caro is here today for a PDL treatments for Cheeks & nose      HALEY Lopez

## 2020-01-15 NOTE — LETTER
1/15/2020       RE: Lindsay Payne  [address] [place] [address]     Dear Colleague,    Thank you for referring your patient, Lindsay Payne, to the [facility] DERMATOLOGY at [facility]. Please see a copy of my visit note below.    See procedure note.    Dermatology Laser Intake Checklist:  History of psoriasis:No  History of recent tan, indoor or outdoor tanning/vacation or other sun exposure: No  History of vitiligo:No  Family history of vitiligo:No  Recent other cosmetic procedure(microderm abrasion/peel/hair removal/facial etc):No  History of HSV:No   Did the patient start valtrex: No  For genital laser hair removal patient only: Is there a history of genital warts or condyloma:No  Tattoo in the area to be treated:No  Is patient using hydroquinone:No  Retinoids and other acne medications stopped for 2 weeks:No  Has the patient had accutane in the last 6-12 months:No  Pregnant or breastfeeding: No  History of skin cancer in area planned for treatment: No  History of treatment with gold:No  Changes in medical history: No  Photos obtained: Yes  Does the patient smoke:No  Is the patient on ibuprofen/aspirin/plavix/coumadin/other blood thinner: No  If patient is taking narcotic or diazepam(valium)-does patient have : No  There were no vitals taken for this visit.    Again, thank you for allowing me to participate in the care of your patient.      Sincerely,    Dinorah Burt MD

## 2020-02-12 ENCOUNTER — OFFICE VISIT (OUTPATIENT)
Dept: DERMATOLOGY | Facility: CLINIC | Age: 40
End: 2020-02-12
Payer: COMMERCIAL

## 2020-02-12 DIAGNOSIS — L71.9 ROSACEA: Primary | ICD-10-CM

## 2020-02-12 DIAGNOSIS — L71.0 PERIORAL DERMATITIS: Primary | ICD-10-CM

## 2020-02-12 NOTE — PROCEDURES
Laser- VBeam(Pulsed Dye Laser) Procedure Note: Cosmetic      Dermatology Problem List:  1. Family history of melanoma   2. 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 Rosacea: metrogel, rhofade   - s/p PDL   4. Spider angioma, nasal bridge  - s/p PDL 18, 18, 1/15/2020, 20  5. Perioral dermatitis - Metrogel  6. Benign bx:  - History of benign lesion biopsied on back per report  - [facility], non-dysplastic per patient  - Nevus pigmentosus, compound, left inferior breast, bx 14  - Lentigo, left breast, bx 2015  7. Dermatitis, chin, possibly perioral  -insurance denied elidel so started meto cream    Procedure Date: 2020    Attending Staff Surgeon: Dr. Dinorah Burt MD    Assistant: Brittny Reese RN     Operating Room Data:     Surgery/Procedure Date:    SAME     Pre-operative Diagnosis:   Spider angioma and Rosacea, cherry angioma  Location: cheeks and nasal ala, left orbital rim    Operation/Procedure    Vbeam pulsed dye laser treatment#: 5      Post-operative Diagnosis:  SAME         Laser Settings: cherry angioma, just above the left orbital rim, near brow - (1 pass)   Energy: 11 J/cm2  Spot size: 3mm  Pulse width:  1.5 mS (0.45 thru 40 mS)  Dynamic cooling spray settin mS  Dynamic cooling device delay:  20 mS    Laser Settings: cheeks (3 passes on medial cheeks )  Energy:7 J/cm2  Spot size:7mm  Pulse width:  6 mS (0.45 thru 40 mS)  Dynamic cooling spray settin mS  Dynamic cooling device delay:  20 mS    Fotofinder photos: No    Anesthesia:  None    Description of Operation/Procedure:   The nature and purpose of the procedure, associated risks, possible consequences, complications and alternative methods of treatment were explained in detail, this includes but is not limited to hyperpigmentation, hypopigmentation, scarring, bruising, hair loss pain/discomfort, eye  injury, and blister. We reviewed that the outcome could be any of the following: no improvement, slight improvement or change in skin color & texture, the skin might be permanently lighter or darker, and though uncommon, superficial scarring may occur.  Multiple treatments may be recommended.   A photo and operative consent were obtained. Time-out was performed.The patient was positioned to optimally expose the area treated. Protective eyewear was worn by the patient and goggles on all personnel in the treatment room. The patient confirmed the site to be treated. The laser energy output was verified by meter reading.      The clinically evident lesion(s) was/were treated with Shara Vbeam pulsed dye laser (595 nm) beam as above. A total of 55 pulses were used. The patient tolerated the procedure well and no complications were noted. Post operative instructions were provided. The total laser operation and preparation time was 10 minutes.      The patient will follow-up in 4-6 weeks for medical visit    The patient will pay the cosmetic fee today.      Staff Involved:  Scribe/Staff    Scribe Disclosure  I, Lealnd Alaniz, am serving as a scribe to document services personally performed by Dr. Dinorah Burt MD, based on data collection and the provider's statements to me.    Provider Disclosure:   The documentation recorded by the scribe accurately reflects the services I personally performed and the decisions made by me.    Dinorah Burt MD    Department of Dermatology  [facility]: Phone: [phone], Fax:[phone]  [facility]: Phone: [phone], Fax: [phone]

## 2020-02-12 NOTE — PROGRESS NOTES
Dermatology Rooming Note    Lindsay Payne's goals for this visit include:   Chief Complaint   Patient presents with     Derm Problem     Caro is here today for PDL procedure     Dermatology Laser Intake Checklist:  History of psoriasis:No  History of recent tan, indoor or outdoor tanning/vacation or other sun exposure: No  History of vitiligo:No  Family history of vitiligo:No  Recent other cosmetic procedure(microderm abrasion/peel/hair removal/facial etc):No  History of HSV:No   Did the patient start valtrex: No  For genital laser hair removal patient only: Is there a history of genital warts or condyloma:No  Tattoo in the area to be treated:No  Is patient using hydroquinone:No  Retinoids and other acne medications stopped for 2 weeks:No  Has the patient had accutane in the last 6-12 months:No  Pregnant or breastfeeding: No  History of skin cancer in area planned for treatment: No  History of treatment with gold:No  Changes in medical history: No  Photos obtained: Yes  Does the patient smoke:No  Is the patient on ibuprofen/aspirin/plavix/coumadin/other blood thinner: No  If patient is taking narcotic or diazepam(valium)-does patient have : No  There were no vitals taken for this visit.

## 2020-02-12 NOTE — PROGRESS NOTES
[facility] Dermatology Note      Dermatology Problem List:  1. Family history of melanoma   2. 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 Rosacea: metrogel, rhofade   - s/p PDL   4. Spider angioma, nasal bridge  - s/p PDL 5/9/18, 11/21/18, 1/15/2020, 02/12/20  5. Perioral dermatitis - Metrogel  6. Benign bx:  - History of benign lesion biopsied on back per report  - History of nevus excision left breast in [place], non-dysplastic per patient  - Nevus pigmentosus, compound, left inferior breast, bx 4/11/14  - Lentigo, left breast, bx 8/7/2015  7. Dermatitis, chin, possibly perioral  -insurance denied elidel so started meto cream    Encounter Date: Feb 12, 2020    CC:  No chief complaint on file.  Dermatitis perioral       History of Present Illness:  Ms. Lindsay Payne is a 39 year old female with dermatitis on chin, could not get elide. Still bothersome, also gets acne in this area. Want PDL, see pdl note      Past Medical History:   Patient Active Problem List   Diagnosis     CARDIOVASCULAR SCREENING; LDL GOAL LESS THAN 160     Paraguard intrauterine device     Gastroesophageal reflux disease, esophagitis presence not specified     Past Medical History:   Diagnosis Date     NO ACTIVE PROBLEMS      Past Surgical History:   Procedure Laterality Date     APPENDECTOMY  1994       Social History:  Patient reports that she has never smoked. She has never used smokeless tobacco. She reports current alcohol use. She reports that she does not use drugs.    Family History:  Family History   Problem Relation Age of Onset     Cancer Mother         malignant melanoma     Arthritis Father         Bekhterev's disease-spinal suffication     Glaucoma Maternal Grandmother        Medications:  Current Outpatient Medications   Medication Sig Dispense Refill     atovaquone-proguanil (MALARONE) 250-100 MG tablet Take 1 tablet by mouth daily  Start 2 days before exposure to Malaria and continue daily till  7 days after exposure. (Patient not taking: Reported on 2/12/2020) 25 tablet 0     cyclobenzaprine (FLEXERIL) 5 MG tablet Take 1 tablet (5 mg) by mouth 3 times daily as needed for muscle spasms (Patient not taking: Reported on 2/12/2020) 15 tablet 0     levonorgestrel (MIRENA) 20 MCG/24HR IUD 1 each by Intrauterine route once       montelukast (SINGULAIR) 10 MG tablet Take 1 tablet (10 mg) by mouth At Bedtime (Patient not taking: Reported on 10/15/2019) 90 tablet 2     omeprazole (PRILOSEC) 20 MG CR capsule Take 1 capsule (20 mg) by mouth daily 90 capsule 1     pimecrolimus (ELIDEL) 1 % external cream Apply topically 2 times daily (Patient not taking: Reported on 2/12/2020) 60 g 4       Allergies   Allergen Reactions     Pnv [Prenatal Vit M-Fz-[id number]-Fa] Itching and Rash       Review of Systems:     -Constitutional: The patient is feeling generally well. Has been reading.     Physical exam:  GEN: This is a well developed, well-nourished female in no acute distress, in a pleasant mood.    SKIN:   Focused exam of face with telangiectasias on cheeks and nose and patch of erythema on lower chin  - No other lesions of concern on areas examined.     Impression/Plan:  1. History of dysplastic nevi and family history of melanoma.     Next skin exam is due 10/2020    2. Healed biopsy site with pigment recurrence, R mid-back. At site of benign biopsy per patient.   Photodocumentation exists and recheck is due 10/2020    3. Rosacea - cheeks and nose , never got rhofade cream, using PDL with improvement, see procedure note  See procedure note  4. Dermatitis, chin, possibly perioral- insurance denies rocco    Will do trial of mtro  Follow-up in 1 year for skin exam, earlier for new or changing lesions.       Staff Involved:    Scribe Disclosure  I, Ruben Faye, am serving as a scribe to document services personally performed by Dr. Dinorah Burt, based  on data collection and the provider's statements to me.     Provider Disclosure:   The documentation recorded by the scribe accurately reflects the services I personally performed and the decisions made by me.    Dinorah Burt MD    Department of Dermatology  [facility]: Phone: [phone], Fax:[phone]  [facility]: Phone: [phone], Fax: [phone]

## 2020-02-12 NOTE — LETTER
2/12/2020       RE: Lindsay Pyane  [address] [place] [address]     Dear Colleague,    Thank you for referring your patient, Lindsay Payne, to the [facility] DERMATOLOGY at [facility]. Please see a copy of my visit note below.    Dermatology Rooming Note    Lindsay Payne's goals for this visit include:   Chief Complaint   Patient presents with     Derm Problem     Caro is here today for PDL procedure     Dermatology Laser Intake Checklist:  History of psoriasis:No  History of recent tan, indoor or outdoor tanning/vacation or other sun exposure: No  History of vitiligo:No  Family history of vitiligo:No  Recent other cosmetic procedure(microderm abrasion/peel/hair removal/facial etc):No  History of HSV:No   Did the patient start valtrex: No  For genital laser hair removal patient only: Is there a history of genital warts or condyloma:No  Tattoo in the area to be treated:No  Is patient using hydroquinone:No  Retinoids and other acne medications stopped for 2 weeks:No  Has the patient had accutane in the last 6-12 months:No  Pregnant or breastfeeding: No  History of skin cancer in area planned for treatment: No  History of treatment with gold:No  Changes in medical history: No  Photos obtained: Yes  Does the patient smoke:No  Is the patient on ibuprofen/aspirin/plavix/coumadin/other blood thinner: No  If patient is taking narcotic or diazepam(valium)-does patient have : No  There were no vitals taken for this visit.          Again, thank you for allowing me to participate in the care of your patient.      Sincerely,    Dinorah Burt MD

## 2020-02-12 NOTE — PATIENT INSTRUCTIONS
Pulse Dye Laser    I will experience redness, swelling, pain, and heat sensation. I may experience bruising, itching, or acne. Risks are blistering, oozing, permanent scarring, hair loss,  temporary or permanent skin lightening or darkening, infection, and eye injury. I understand my outcome could be no improvement, slight improvement. Multiple treatments may be required.    After treatment, Do Not:    Rub, scratch, or put weight on the site for 2 weeks    Wear tight fitting clothing or jewelry over the site    Brown. Keep the site out of sunlight. Use sunscreen of 30 SPF or greater when in the sun. Use sunscreen 30 minutes before going out and reapply if sweating. Tanning decreases the success of the treatment     How do I care for the treated site?    Use ice packs for 10 minutes after the procedure for swelling     If the site is on your face, use ice again 1 hour after treatment    If a scab or crust forms, gently cleanse the site with hydrogen peroxide. Then put on Vaseline  ointment 3 times a day    Do not use makeup on any open wound    What should I expect?    Blue-gray color that may take 2 to 3 weeks to go away    Redness may also last a week or longer    Results may take up to 3 or 4 months after treatment    More procedures may be needed    Who should I call with questions?    [facility]: [phone]     [facility]: [phone]    For urgent needs outside of business hours call the [facility] at [phone] and ask for the dermatology resident on call

## 2020-02-12 NOTE — PATIENT INSTRUCTIONS
Pulse Dye Laser    I will experience redness, swelling, pain, and heat sensation. I may experience bruising, itching, or acne. Risks are blistering, oozing, permanent scarring, hair loss,  temporary or permanent skin lightening or darkening, infection, and eye injury. I understand my outcome could be no improvement, slight improvement. Multiple treatments may be required.    After treatment, Do Not:    Rub, scratch, or put weight on the site for 2 weeks    Wear tight fitting clothing or jewelry over the site    Brown. Keep the site out of sunlight. Use sunscreen of 30 SPF or greater when in the sun. Use sunscreen 30 minutes before going out and reapply if sweating. Tanning decreases the success of the treatment     How do I care for the treated site?    Use ice packs for 10 minutes after the procedure for swelling     If the site is on your face, use ice again 1 hour after treatment    If a scab or crust forms, gently cleanse the site with hydrogen peroxide. Then put on Vaseline  ointment 3 times a day    Do not use makeup on any open wound    What should I expect?    Blue-gray color that may take 2 to 3 weeks to go away    Redness may also last a week or longer    Results may take up to 3 or 4 months after treatment    More procedures may be needed    Who should I call with questions?    [facility]: [phone]     [facility]: [phone]    For urgent needs outside of business hours call the [facility] at [phone] and ask for the dermatology resident on call

## 2020-02-13 ENCOUNTER — TELEPHONE (OUTPATIENT)
Dept: DERMATOLOGY | Facility: CLINIC | Age: 40
End: 2020-02-13

## 2020-02-13 NOTE — TELEPHONE ENCOUNTER
Prior Authorization Retail Medication Request    Medication/Dose: Pimecrolimus   ICD code (if different than what is on RX):  Perioral dermatitis [L71.0]   Previously Tried and Failed:    Rationale:      Insurance Name:  [employer]  Insurance ID:  [id number]      Pharmacy Information (if different than what is on RX)  Name:  [facility]   Phone:  [phone]

## 2020-02-14 NOTE — TELEPHONE ENCOUNTER
PA Initiation    Medication: Pimecrolimus 1% Cream -   Insurance Company: [employer] [place] - Phone [phone] Fax [phone]  Pharmacy Filling the Rx: [id number] - [place], [place] - [address] [place] [address] AT [facility]  Filling Pharmacy Phone: [phone]  Filling Pharmacy Fax: [phone]  Start Date: 2/14/2020

## 2020-02-15 NOTE — TELEPHONE ENCOUNTER
Prior Authorization Approval    Medication: Pimecrolimus 1% Cream - APPROVED was approved on 2/14/2020  Effective: 1/22/2020 to 1/22/2021  Reference #:    Approved Dose/Quantity:   Insurance Company: [employer] [place] - Phone [phone] Fax [phone]  Expected CoPay:    Pharmacy Filling the Rx: [id number] - [place], [place] - [address] [place] [address]  Pharmacy Notified: Yes  Patient Notified: Comment:  **Instructed pharmacy to notify patient when script is ready to /ship.**

## 2020-04-23 ENCOUNTER — TELEPHONE (OUTPATIENT)
Dept: DERMATOLOGY | Facility: CLINIC | Age: 40
End: 2020-04-23

## 2020-04-23 NOTE — TELEPHONE ENCOUNTER
"Teledermatology Nurse Call for RETURN patients seen within the last 3 years:    The patient was contacted by phone and we reviewed, \"Due to the coronavirus pandemic, we are calling to review your visit and offer you a teledermatology visit where you send in photos via Actions. These photos will be seen by an MD or JOSÉ. This will be billed to you and your insurance.\"  The patient was also told that \"a teledermatology visit is not as thorough as an in-person visit and that the quality of the photograph sent may not be of the same quality as that taken by the dermatology clinic, but the patient would like to proceed with an teledermatology because of National Emergency Regarding Coronavirus disease (COVID 19) Outbreak.\"  \"If a prescription is necessary we can send it directly to your pharmacy.  If lab work is needed we can place an order for that and you can then stop by our lab to have the test done at a later time.\"    The patient understood that they may receive a call from the clinic to review additional history, may still be instructed to come to clinic even after photo review and be billed for both visits with an MD. They were told that a photo assessment does not replace an in person skin exam. The patient understood that teledermatology is not for urgent issues and would require up to 3 business days for review. The patient denied skin pain, fever, mucosal symptoms (lesions, blisters, sores in the mouth, nose, eyes, or genitals)  IF PATIENT ENDORSES ANY OF THESE STOP AND PAGE  ON CALL ATTENDING. IF OTHER POSSIBLY URGENT SYMPTOMS THEN PAGE PHYSICIAN YOU ARE SCHEDULING WITH OR ON CALL IF NO ANSWER.       The patient chose to:                                                                                                                                                                                                                    The patient selected [facility]. This video visit will be conducted via a call " between you and your physician/provider via [employer]. We have found that certain health care needs can be provided without the need for an in-person physical exam.  This service lets us provide the care you need with a video conversation. f during the course of the call the physician/provider feels a video visit is not appropriate, you will not be charged for this service. Patient would like the video invitation sent by: Send to e-mail at: [email or web address]                                                                                                                                                                                                           Patient concerns for this return visit: Perioral dermatitis                                                                                                                                                                                                                   -The patient was told to contact the clinic if they have not received correspondence within 72 hours.

## 2020-04-28 ENCOUNTER — MYC MEDICAL ADVICE (OUTPATIENT)
Dept: FAMILY MEDICINE | Facility: CLINIC | Age: 40
End: 2020-04-28

## 2020-04-28 ENCOUNTER — MYC MEDICAL ADVICE (OUTPATIENT)
Dept: DERMATOLOGY | Facility: CLINIC | Age: 40
End: 2020-04-28

## 2020-04-28 DIAGNOSIS — M62.830 BACK MUSCLE SPASM: ICD-10-CM

## 2020-04-28 RX ORDER — CYCLOBENZAPRINE HCL 5 MG
5 TABLET ORAL 3 TIMES DAILY PRN
Qty: 15 TABLET | Refills: 0 | Status: SHIPPED | OUTPATIENT
Start: 2020-04-28 | End: 2021-11-12

## 2020-04-28 NOTE — TELEPHONE ENCOUNTER
Photos noted in chart for tomorrows visit at [facility] with Dr. Carmel Lacey, CHRIS

## 2020-04-28 NOTE — TELEPHONE ENCOUNTER
Requested Prescriptions   Pending Prescriptions Disp Refills     cyclobenzaprine (FLEXERIL) 5 MG tablet 15 tablet 0     Sig: Take 1 tablet (5 mg) by mouth 3 times daily as needed for muscle spasms       There is no refill protocol information for this order        Last Written Prescription Date: 12/20/19   Last Fill Quantity: 15,  # refills: 0   Last office visit: 10/28/2019 with prescribing provider:  [facility] clinic provider.   Future Office Visit: none    Routing refill request to provider for review/approval because:  Drug not on the G refill protocol     Curt Redd RN   [facility]

## 2020-04-29 ENCOUNTER — MYC MEDICAL ADVICE (OUTPATIENT)
Dept: DERMATOLOGY | Facility: CLINIC | Age: 40
End: 2020-04-29

## 2020-04-29 ENCOUNTER — VIRTUAL VISIT (OUTPATIENT)
Dept: DERMATOLOGY | Facility: CLINIC | Age: 40
End: 2020-04-29
Payer: COMMERCIAL

## 2020-04-29 DIAGNOSIS — L50.9 PRESSURE URTICARIA: ICD-10-CM

## 2020-04-29 DIAGNOSIS — L71.0 PERIORAL DERMATITIS: Primary | ICD-10-CM

## 2020-04-29 RX ORDER — HYDROCORTISONE 2.5 %
CREAM (GRAM) TOPICAL 2 TIMES DAILY
Qty: 15 G | Refills: 0 | Status: SHIPPED | OUTPATIENT
Start: 2020-04-29 | End: 2021-11-12

## 2020-04-29 RX ORDER — FEXOFENADINE HCL 180 MG/1
180 TABLET ORAL 2 TIMES DAILY
Qty: 60 TABLET | Refills: 3 | Status: SHIPPED | OUTPATIENT
Start: 2020-04-29 | End: 2021-11-12

## 2020-04-29 RX ORDER — PIMECROLIMUS 10 MG/G
CREAM TOPICAL 2 TIMES DAILY
Qty: 30 G | Refills: 3 | Status: SHIPPED | OUTPATIENT
Start: 2020-04-29 | End: 2021-11-12

## 2020-04-29 ASSESSMENT — PAIN SCALES - GENERAL: PAINLEVEL: NO PAIN (0)

## 2020-04-29 NOTE — PROGRESS NOTES
[facility]  Store and Forward and Video ( Invitation sent by:  Georgette and send to e-mail at: [email or web address] )      Impression and Recommendations (Patient Counseled on the Following):  1. Lower face eruption, possibly dermatitis, irritant. Also consider Periorificial dermatitis. Patient has pink discoloration and one acneiform papule. Given her symptoms of burning and no improvement with metrocream, we will prescribe a short course of a low-potency topical steroid to help with her symptoms while we wait for Elidel to be approved. Photo Quality is limited in the setting of this COVID timed visit  -Continue metrocream while waiting for insurance to approve Elidel, resent prescription  - Use hydrocortisone 2.5% BID for 2 weeks.  -then transition to elidel, she will contact us to help with coverage if this is not covered    2. Erythema toes after working out or wearing boots. Most likely Pressure induced urticaria of bilateral feet. DDX includes pernio but symptoms, photos do not quite fit. No symptoms of COVID.   - Start fexofenadine 180 mg BID, reviewed rare risk of cardiac issues    Follow-up:   Follow-up with dermatology in approximately 4 weeks. Earlier for new or changing lesions or rash.      Staff and resident:    Wilson Campa MD  Dermatology Resident, PGY-2    Dr. Burt was on the phone for the entire visit and agrees with the assessment and plan as documented in this note.    Staff Physician Comments:   I, Dinorah Burt MD, was with the resident on the phone visit for entire time documented and agree with the resident's findings and plan of care as documented in the resident's note. I reviewed the photos.     Dinorah Burt MD   Department of Dermatology  [facility]: Phone: [phone], Fax:[phone]  [facility]: Phone: [phone], Fax: [phone]  4/29/2020    _____________________________________________________________________________    Dermatology Problem List:  1. Family history of melanoma   2. 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 Rosacea: metrogel, rhofade   - s/p PDL   4. Spider angioma, nasal bridge  - s/p PDL 5/9/18, 11/21/18, 1/15/2020, 02/12/20  5. Perioral dermatitis - Metrogel  6. Benign bx:  - History of benign lesion biopsied on back per report  - History of nevus excision left breast in [facility], non-dysplastic per patient  - Nevus pigmentosus, compound, left inferior breast, bx 4/11/14  - Lentigo, left breast, bx 8/7/2015  7. Dermatitis, chin, possibly perioral, not improving with metrogel  -2 weeks Bid 2.5% hydrocortisone  -Will retry for elidel for chin  8. Pressure induced urticaria bilateral feet  -Fexofenadine 180 mg BID    Encounter Date: Apr 29, 2020    CC:   Chief Complaint   Patient presents with     Derm Problem     Perioral dermatitis f/u - Caro states that things have stayed the same     Derm Problem     Caro is concerned about possibly having athletes's foot       History of Present Illness:  I have reviewed the teledermatology information and the nursing intake corresponding to this issue. Lindsay Payne is a 39 year old female who presents via teledermatology for follow-up of perioral dermatatis. Perioral dermatitis not improving with metrocream. Elidel was not approved by insurance. She also has been getting itching and swelling of bilateral feet and toes anytime after she wears shoes or socks. The itching and swelling resolves after she takes her socks or shoes off. She denies any systemic symptoms including tongue swelling, shortness of breath, rash elsewhere. She has no other concerns. No fevers, chills. No involvement of hands.      ROS: Patient is generally feeling well today.    Physical Examination:  General: Well-appearing female,  appropriately-developed individual.  Skin: Focused examination within the teledermatology photograph(s) including face and left foot was performed.  - One acneiform papule on lower cutaneous lip  - Faint pink discoloration on bilateral cheeks and surrounding mouth  -erythematous patches on 2nd through 4th toe on left foot, most medial toes possibly with erythema    Labs:  Reviewed    Past Medical History:   Patient Active Problem List   Diagnosis     CARDIOVASCULAR SCREENING; LDL GOAL LESS THAN 160     Paraguard intrauterine device     Gastroesophageal reflux disease, esophagitis presence not specified     Past Medical History:   Diagnosis Date     NO ACTIVE PROBLEMS      Past Surgical History:   Procedure Laterality Date     APPENDECTOMY  1994       Social History:  Patient reports that she has never smoked. She has never used smokeless tobacco. She reports current alcohol use. She reports that she does not use drugs.    Family History:  Family History   Problem Relation Age of Onset     Cancer Mother         malignant melanoma     Arthritis Father         Bekhterev's disease-spinal suffication     Glaucoma Maternal Grandmother        Medications:  Current Outpatient Medications   Medication     cyclobenzaprine (FLEXERIL) 5 MG tablet     levonorgestrel (MIRENA) 20 MCG/24HR IUD     metroNIDAZOLE (METROCREAM) 0.75 % external cream     montelukast (SINGULAIR) 10 MG tablet     omeprazole (PRILOSEC) 20 MG CR capsule     pimecrolimus (ELIDEL) 1 % external cream     atovaquone-proguanil (MALARONE) 250-100 MG tablet     No current facility-administered medications for this visit.           Allergies   Allergen Reactions     Pnv [Prenatal Vit R-Zs-[id number]-Fa] Itching and Rash         _____________________________________________________________________________    Teledermatology information:  - Location of patient: Home  - Patient presented as: return  - Location of teledermatologist:  [facility] DERMATOLOGY )  - Reason  teledermatology is appropriate:  of National Emergency Regarding Coronavirus disease (COVID 19) Outbreak  - Image quality and interpretability: acceptable  - Physician has received verbal consent for a Video/Photos Visit from the patient? Yes  - In-person dermatology visit recommendation: no  - Date of images: 4/29/2020  - Service start time: 12:44 pm  - Service end time: 1:02 pm  - Date of report: 4/29/2020

## 2020-04-29 NOTE — PROGRESS NOTES
"Teledermatology Nurse Call for RETURN patients seen within the last 3 years:    The patient was contacted by phone and we reviewed, \"Due to the coronavirus pandemic, we are calling to review your visit and offer you a teledermatology visit where you send in photos via GRAYL. These photos will be seen by an MD or JOSÉ. This will be billed to you and your insurance.\"  The patient was also told that \"a teledermatology visit is not as thorough as an in-person visit and that the quality of the photograph sent may not be of the same quality as that taken by the dermatology clinic, but the patient would like to proceed with an teledermatology because of National Emergency Regarding Coronavirus disease (COVID 19) Outbreak.\"  \"If a prescription is necessary we can send it directly to your pharmacy.  If lab work is needed we can place an order for that and you can then stop by our lab to have the test done at a later time.\"    The patient understood that they may receive a call from the clinic to review additional history, may still be instructed to come to clinic even after photo review and be billed for both visits with MD Adilia. They were told that a photo assessment does not replace an in person skin exam. The patient understood that teledermatology is not for urgent issues and would require up to 3 business days for review. The patient denied skin pain, fever, mucosal symptoms (lesions, blisters, sores in the mouth, nose, eyes, or genitals)  IF PATIENT ENDORSES ANY OF THESE STOP AND PAGE  ON CALL ATTENDING. IF OTHER POSSIBLY URGENT SYMPTOMS THEN PAGE PHYSICIAN YOU ARE SCHEDULING WITH OR ON CALL IF NO ANSWER.       The patient chose to:                                                                                                                                                                                                                    The patient selected Georgette. This video visit will be conducted via a " call between you and your physician/provider via [place]. We have found that certain health care needs can be provided without the need for an in-person physical exam.  This service lets us provide the care you need with a video conversation. f during the course of the call the physician/provider feels a video visit is not appropriate, you will not be charged for this service. Patient would like the video invitation sent by: Send to e-mail at: [email or web address]                                                                                                                                                                                                           Patient concerns for this return visit: perioral dermatitis    Nursing tasks completed  -Pharmacy preference was updated.  -The nurse has dropped in the AVS information *(For adults the phrase is umdermhteleavs and for pediatrics it is their own) for the physician to route in the AVS.                                                                                                                                                                                                                         -The patient was told to contact the clinic if they have not received correspondence within 72 hours.

## 2020-04-29 NOTE — NURSING NOTE
Dermatology Rooming Note    Lindsay Payne's goals for this visit include:   Chief Complaint   Patient presents with     Derm Problem     Perioral dermatitis f/u - Caro states that things have stayed the same     Derm Problem     Caro is concerned about possibly having athletes's foot     Carin Nick, EMT

## 2020-04-29 NOTE — PATIENT INSTRUCTIONS
[facility] Teledermatology Visit    Thank you for allowing us to participate in your care. Your findings, instructions and follow-up plan are as follows:  -start allegra 180mg twice daily for 4 weeks  -use hydrocortisone 2.5% cream twice daily on chin for 2 weeks then stop  -After 2 weeks, transition to elidel cream, if this is not covered by insurance, send Dr. Burt a message.   -take more photos of hands and feet for 4 week follow up    Have a great day!    When should I call my doctor?    If you are worsening or not improving, please, contact us or seek urgent care as noted below.     Who should I call with questions (adults)?    [facility] (adult and pediatric): [phone]     [facility] (adult): [phone]    For urgent needs outside of business hours call the [facility] at [phone] and ask for the dermatology resident on call    If this is a medical emergency and you are unable to reach an ER, Call [phone]      Who should I call with questions (pediatric)?  [facility]- Pediatric Dermatology  Dr. Erica Durbin, Dr. Ciara Truong, Dr. Rizwana Arenas, [facility]  Dr. Megan García, Dr. Lexie Hernadez & Dr. Thomas Puckett  Non Urgent  Nurse Triage Line; [phone]- Faina and Shawnee BALDWIN Care Coordinators   Shantel (/Complex ) [phone]    If you need a prescription refill, please contact your pharmacy. Refills are approved or denied by our Physicians during normal business hours, Monday through Fridays  Per office policy, refills will not be granted if you have not been seen within the past year (or sooner depending on your child's condition)    Scheduling Information:  Pediatric Appointment Scheduling and Call Center [phone]  Radiology Scheduling- [phone]  Sedation Unit Scheduling- [phone]  [place] Scheduling- General [phone];  Pediatric Dermatology [phone]  Main  Services: [phone]  Marshallese: [phone]  Faroese: [phone]  Hmong/St Helenian/Azeri: [phone]  Preadmission Nursing Department Fax Number: [phone] (Fax all pre-operative paperwork to this number)    For urgent matters arising during evenings, weekends, or holidays that cannot wait for normal business hours please call [phone] and ask for the Dermatology Resident On-Call to be paged.

## 2020-05-11 ENCOUNTER — VIRTUAL VISIT (OUTPATIENT)
Dept: FAMILY MEDICINE | Facility: OTHER | Age: 40
End: 2020-05-11

## 2020-05-11 ENCOUNTER — MYC MEDICAL ADVICE (OUTPATIENT)
Dept: DERMATOLOGY | Facility: CLINIC | Age: 40
End: 2020-05-11

## 2020-05-11 DIAGNOSIS — L29.9 PRURITUS OF SKIN: Primary | ICD-10-CM

## 2020-05-11 RX ORDER — CLOBETASOL PROPIONATE 0.5 MG/G
CREAM TOPICAL
Qty: 45 G | Refills: 0 | Status: SHIPPED | OUTPATIENT
Start: 2020-05-11 | End: 2021-11-12

## 2020-05-11 NOTE — PROGRESS NOTES
"Date: 2020 13:49:15  Clinician: Jose A Rivas  Clinician NPI: [id number]  Patient: Lindsay Payne  Patient : 1980  Patient Address: [address], [place] [address]  Patient Phone: [phone]  Visit Protocol: URI  Patient Summary:  Lindsay is a 39 year old ( : 1980 ) female who initiated a Visit for COVID-19 (Coronavirus) evaluation and screening. When asked the question \"Please sign me up to receive news, health information and promotions from [employer].\", Lindsay responded \"No\".    Lindsay states her symptoms started gradually 2-3 weeks ago.   Lindsay does not have any symptoms. Lindsay denies having fever, myalgias, rhinitis, facial pain or pressure, sore throat, cough, nasal congestion, vomiting, nausea, teeth pain, ageusia, anosmia, diarrhea, ear pain, headache, malaise, wheezing, enlarged lymph nodes, and chills. She also denies taking antibiotic medication for the symptoms, double sickening (worsening symptoms after initial improvement), and having recent facial or sinus surgery in the past 60 days. She is not experiencing dyspnea.    Pertinent COVID-19 (Coronavirus) information  Lindsay does not work or volunteer as healthcare worker or a  and does not work or volunteer in a healthcare facility.   She does not live with a healthcare worker.   Lindsay has not had a close contact with a laboratory-confirmed COVID-19 patient within 14 days of symptom onset. She also has not had a close contact with a suspected COVID-19 patient within 14 days of symptom onset.   Triage Point(s) temporarily suspended for COVID-19 (Coronavirus) screening  Lindsay reported the following symptoms which were previously protocol referral points. These protocol referral points have temporarily been removed for purposes of COVID-19 (Coronavirus) screening.   Denied all URI symptoms   Pertinent medical history  Lindsay typically gets a yeast infection when she takes antibiotics. She " has used fluconazole (Diflucan) to treat previous yeast infections. 2 doses of fluconazole (Diflucan) has typically been needed for symptoms to resolve in the past.  Lindsay does not need a return to work/school note.   Weight: 148 lbs   Lindsay does not smoke or use smokeless tobacco.   She denies pregnancy and denies breastfeeding. She does not menstruate.   Additional information as reported by the patient (free text): I don't have any symptoms of COVID but I have itchy, blistery toes. My dermatologist, Dr. Dinorah Burt, suggested I get tested for COVID because my toes look like 'COVID toes' to make sure it's not due to COVID before we proceed to other possible causes.   Weight: 148 lbs    MEDICATIONS: Allegra-D 12 Hour oral, ALLERGIES: Allegra-D 12 Hour  Clinician Response:  Dear Lindsay,   Dear Lindsay  Your symptoms show that you may have coronavirus (COVID-19). This illness can cause fever, cough and trouble breathing. Many people get a mild case and get better on their own. Some people can get very sick.   Will I be tested for COVID-19?  Because we have limited testing supplies, we do not test everyone who is at low risk. We are testing if:    You are very ill. For example, you're on chemotherapy, dialysis or home hospice care. (Contact your specialty clinic or program.)   You live in a nursing home or other long-term care facility. (Talk to your nurse manager or medical director.)   You're a health care worker. ([facility] employees contact our employee health office for testing.)   We are performing limited curbside testing for healthcare/first responders and people with medical problems that put them at increased risk. It does not appear by the [facility] information you submitted that you meet any of these criteria. If there are medical problems that we did not know about, please repeat an [facility] visit and let us know what medical conditions you have.   How can I protect others?  Without a test, we  can't know for sure that you have COVID-19. For safety, it's very important to follow these rules.  First, stay home and away from others (self-isolate) until:   You've had no fever---and no medicine that reduces fever---for 3 full days (72 hours). And...    Your other symptoms have gotten better. For example, your cough or breathing has improved. And...   At least 10 days have passed since your symptoms started.   During this time:   Don't go to work, school or anywhere else.    Stay away from others in your home. No hugging, kissing or shaking hands.   Don't let anyone visit.   Cover your mouth and nose with a mask, tissue or wash cloth to avoid spreading germs.   Wash your hands and face often. Use soap and water.   How can I take care of myself?   1.Take Tylenol (acetaminophen) for fever or pain. If you have liver or kidney problems, ask your family doctor if it's okay to take Tylenol.        Adults can take either:    650 mg (two 325 mg pills) every 4 to 6 hours, or...   1,000 mg (two 500 mg pills) every 8 hours as needed.    Note: Don't take more than 3,000 mg in one day.   For children, check the Tylenol bottle for the right dose. The dose is based on the child's age or weight.   2.If you have other health problems (like cancer, heart failure, an organ transplant or severe kidney disease): Call your specialty clinic if you don't feel better in the next 2 days.       3.Know when to call 911: If your breathing is so bad that it keeps you from doing normal activities, call 911 or go to the emergency room. Tell them that you've been staying home and may have COVID-19.   Where can I get more information?  To learn more about COVID-19 and how to care for yourself at home, please visit the CDC website at https://www.cdc.gov/coronavirus/2019-ncov/about/steps-when-sick.html.  For more about your care at [facility], please visit [email or web address]   If you are interested in becoming part  of a [facility] clinic trial related to COVID19 please go to [email or web address] for information, if you qualify.     Diagnosis: Other atopic dermatitis  Diagnosis ICD: L20.89

## 2020-05-15 ENCOUNTER — VIRTUAL VISIT (OUTPATIENT)
Dept: DERMATOLOGY | Facility: CLINIC | Age: 40
End: 2020-05-15
Payer: COMMERCIAL

## 2020-05-15 DIAGNOSIS — T69.1XXA CHILBLAINS, INITIAL ENCOUNTER: Primary | ICD-10-CM

## 2020-05-15 ASSESSMENT — PAIN SCALES - GENERAL: PAINLEVEL: MILD PAIN (2)

## 2020-05-15 NOTE — PATIENT INSTRUCTIONS
[facility] Teledermatology Visit    Thank you for allowing us to participate in your care. Your findings, instructions and follow-up plan are as follows:    Start clobetasol cream to the spots on the toes  If this does not work, we can try a different cream or start the aluminum chloride medication to decrease sweating  If you develop other symptoms (joint or muscle pain different from your exercise routines, rashes in other areas, shortness of breath, worsening fatigue, fever), please let me know and we can perform further workup and labs as needed.    When should I call my doctor?    If you are worsening or not improving, please, contact us or seek urgent care as noted below.     Who should I call with questions (adults)?    [facility] (adult and pediatric): [phone]     [facility] (adult): [phone]    For urgent needs outside of business hours call the [facility] at [phone] and ask for the dermatology resident on call    If this is a medical emergency and you are unable to reach an ER, Call [phone]      Who should I call with questions (pediatric)?  [facility]- Pediatric Dermatology  Dr. Erica Durbin, Dr. Ciara Truong, Dr. Rizwana Arenas, Ruth Mccann, DICK García, Dr. Lexie Hernadez & Dr. Thomas Puckett  Non Urgent  Nurse Triage Line; [phone]- Faina and Shawnee BALDWIN Care Coordinators   Shantel (/Complex ) [phone]    If you need a prescription refill, please contact your pharmacy. Refills are approved or denied by our Physicians during normal business hours, Monday through Fridays  Per office policy, refills will not be granted if you have not been seen within the past year (or sooner depending on your child's condition)    Scheduling Information:  Pediatric Appointment Scheduling and Call Center [phone]  Radiology Scheduling-  [phone]  Sedation Unit Scheduling- [phone]  [place] Scheduling- General [phone]; Pediatric Dermatology [phone]  Main  Services: [phone]  Tunisian: [phone]  Indonesian: [phone]  Hmong/Japanese/Eligio: [phone]  Preadmission Nursing Department Fax Number: [phone] (Fax all pre-operative paperwork to this number)    For urgent matters arising during evenings, weekends, or holidays that cannot wait for normal business hours please call [phone] and ask for the Dermatology Resident On-Call to be paged.

## 2020-05-15 NOTE — PROGRESS NOTES
[facility]  Store and Forward and Video ( Invitation sent by:  Georgette and send to e-mail at: [email or web address] )      Impression and Recommendations (Patient Counseled on the Following):  1. Foot eruption: photos appear consistent with pernio. Agree with initiation of clobetasol cream. If refractory, consider clobetasol or betamethasone ointment. Discussed initiation of aluminum chloride as this can reduce sweating that trigger evaporative cooling-related exacerbation of symptoms. At this time given absence of other symptoms, will not check labs but low threshold pending clinical response and systemic symptoms. Also discussed biopsy but will hold off pending clinical response and more characteristic clinical appearance.  -Clobetasol 0.05% cream BID to foot lesions  -Behavioral changes discussed      Follow-up:   Follow-up with dermatology in approximately 4-5 weeks. Earlier for new or changing lesions or rash.      Staff only:  Thomas Hand MD   of Dermatology  Department of Dermatology  [facility]      _____________________________________________________________________________    Dermatology Problem List:  1. Family history of melanoma   2. 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 Rosacea: metrogel, rhofade   - s/p PDL   4. Spider angioma, nasal bridge  - s/p PDL 5/9/18, 11/21/18, 1/15/2020, 02/12/20  5. Perioral dermatitis - Metrogel  6. Benign bx:  - History of benign lesion biopsied on back per report  - History of nevus excision left breast in [place], non-dysplastic per patient  - Nevus pigmentosus, compound, left inferior breast, bx 4/11/14  - Lentigo, left breast, bx 8/7/2015  7. Dermatitis, chin, possibly perioral, not improving with metrogel  -2 weeks Bid 2.5% hydrocortisone  -Will retry for elidel for chin  8. Pressure induced urticaria bilateral  feet  -Fexofenadine 180 mg BID    Encounter Date: May 15, 2020    CC:   Chief Complaint   Patient presents with     Derm Problem     Perioral dermatitis        History of Present Illness:  I have reviewed the teledermatology information and the nursing intake corresponding to this issue. Lindsay Payne is a 39 year old female who presents via teledermatology for a rash on the toes.    For several months - thought had athlete's foot   - but not barefoot in gym for several years - don't change shoes   - in fall, when where closed shoes, toes get very itchy   - started Tinactin spray - seems to help   - started on the outside digits    At beginning of April - red blisters on toes   - toes feel swollen - rub against each other   - can't put feet under blanket - get hot and itchy    When run - wear cotton socks and breathable shoes - no issues   - once stop running - very itchy    Dry cuticles - tend to pick at them - bleed when pick at them    Some achiness after running, no shortness of breath    ROS:   General: no fevers, chills, weight loss, or fatigue  Skin: as per HPI  MSK - no arthralgias/myalgias  Pulm - no shortness of breath    Physical Examination:  General: Well-appearing, appropriately-developed individual. Alert and oriented in a pleasant mood.  Skin: Focused examination within the teledermatology photograph(s) including face, feet was performed.   -erythematous papules and macules with some scaling on the toes, most prominent on the distal phalanges  -ill-defined erythema on the nasolabial folds, lower lateral cutaneous lip, and inferior cheeks      Past Medical History:   Patient Active Problem List   Diagnosis     CARDIOVASCULAR SCREENING; LDL GOAL LESS THAN 160     Paraguard intrauterine device     Gastroesophageal reflux disease, esophagitis presence not specified     Past Medical History:   Diagnosis Date     NO ACTIVE PROBLEMS      Past Surgical History:   Procedure Laterality Date     APPENDECTOMY   1994       Social History:  Patient reports that she has never smoked. She has never used smokeless tobacco. She reports current alcohol use. She reports that she does not use drugs.    Family History:  Family History   Problem Relation Age of Onset     Cancer Mother         malignant melanoma     Arthritis Father         Bekhterev's disease-spinal suffication     Glaucoma Maternal Grandmother        Medications:  Current Outpatient Medications   Medication     atovaquone-proguanil (MALARONE) 250-100 MG tablet     clobetasol (TEMOVATE) 0.05 % external cream     cyclobenzaprine (FLEXERIL) 5 MG tablet     fexofenadine (ALLEGRA) 180 MG tablet     hydrocortisone 2.5 % cream     levonorgestrel (MIRENA) 20 MCG/24HR IUD     metroNIDAZOLE (METROCREAM) 0.75 % external cream     montelukast (SINGULAIR) 10 MG tablet     omeprazole (PRILOSEC) 20 MG CR capsule     pimecrolimus (ELIDEL) 1 % external cream     pimecrolimus (ELIDEL) 1 % external cream     No current facility-administered medications for this visit.           Allergies   Allergen Reactions     Pnv [Prenatal Vit H-Aq-Gefypqzgy-[id number]] Itching and Rash         _____________________________________________________________________________    Teledermatology information:  - Location of patient: Home  - Patient presented as: return  - Location of teledermatologist:  ([facility] )  - Reason teledermatology is appropriate:  of National Emergency Regarding Coronavirus disease (COVID 19) Outbreak  - Image quality and interpretability: acceptable  - Physician has received verbal consent for a Video/Photos Visit from the patient? Yes  - In-person dermatology visit recommendation: no  - Date of images: 4/28/2020, 5/11/2020, 5/14/2020  - Service start time: 8:49  - Service end time: 9:01  - Date of report: 5/15/2020

## 2020-08-06 ENCOUNTER — TRANSFERRED RECORDS (OUTPATIENT)
Dept: HEALTH INFORMATION MANAGEMENT | Facility: CLINIC | Age: 40
End: 2020-08-06

## 2020-08-26 ENCOUNTER — OFFICE VISIT - HEALTHEAST (OUTPATIENT)
Dept: INTERNAL MEDICINE | Facility: CLINIC | Age: 40
End: 2020-08-26

## 2020-08-26 ENCOUNTER — COMMUNICATION - HEALTHEAST (OUTPATIENT)
Dept: TELEHEALTH | Facility: CLINIC | Age: 40
End: 2020-08-26

## 2020-08-26 DIAGNOSIS — Z01.818 PRE-OP EXAM: ICD-10-CM

## 2020-08-26 DIAGNOSIS — S83.281D TEAR OF LATERAL CARTILAGE OR MENISCUS OF KNEE, CURRENT, RIGHT, SUBSEQUENT ENCOUNTER: ICD-10-CM

## 2020-08-26 LAB
ANION GAP SERPL CALCULATED.3IONS-SCNC: 9 MMOL/L (ref 5–18)
ATRIAL RATE - MUSE: 58 BPM
BASOPHILS # BLD AUTO: 0 THOU/UL (ref 0–0.2)
BASOPHILS NFR BLD AUTO: 0 % (ref 0–2)
BUN SERPL-MCNC: 10 MG/DL (ref 8–22)
CALCIUM SERPL-MCNC: 9.8 MG/DL (ref 8.5–10.5)
CHLORIDE BLD-SCNC: 104 MMOL/L (ref 98–107)
CO2 SERPL-SCNC: 26 MMOL/L (ref 22–31)
CREAT SERPL-MCNC: 0.77 MG/DL (ref 0.6–1.1)
DIASTOLIC BLOOD PRESSURE - MUSE: NORMAL
EOSINOPHIL # BLD AUTO: 0.4 THOU/UL (ref 0–0.4)
EOSINOPHIL NFR BLD AUTO: 6 % (ref 0–6)
ERYTHROCYTE [DISTWIDTH] IN BLOOD BY AUTOMATED COUNT: 11 % (ref 11–14.5)
GFR SERPL CREATININE-BSD FRML MDRD: >60 ML/MIN/1.73M2
GLUCOSE BLD-MCNC: 82 MG/DL (ref 70–125)
HCG SERPL QL: NEGATIVE
HCT VFR BLD AUTO: 42.5 % (ref 35–47)
HGB BLD-MCNC: 14.6 G/DL (ref 12–16)
INTERPRETATION ECG - MUSE: NORMAL
LYMPHOCYTES # BLD AUTO: 1.5 THOU/UL (ref 0.8–4.4)
LYMPHOCYTES NFR BLD AUTO: 24 % (ref 20–40)
MCH RBC QN AUTO: 31.9 PG (ref 27–34)
MCHC RBC AUTO-ENTMCNC: 34.5 G/DL (ref 32–36)
MCV RBC AUTO: 92 FL (ref 80–100)
MONOCYTES # BLD AUTO: 0.3 THOU/UL (ref 0–0.9)
MONOCYTES NFR BLD AUTO: 5 % (ref 2–10)
NEUTROPHILS # BLD AUTO: 4 THOU/UL (ref 2–7.7)
NEUTROPHILS NFR BLD AUTO: 64 % (ref 50–70)
P AXIS - MUSE: 39 DEGREES
PLATELET # BLD AUTO: 236 THOU/UL (ref 140–440)
PMV BLD AUTO: 7.8 FL (ref 7–10)
POTASSIUM BLD-SCNC: 4.3 MMOL/L (ref 3.5–5)
PR INTERVAL - MUSE: 172 MS
QRS DURATION - MUSE: 80 MS
QT - MUSE: 396 MS
QTC - MUSE: 388 MS
R AXIS - MUSE: 77 DEGREES
RBC # BLD AUTO: 4.59 MILL/UL (ref 3.8–5.4)
SODIUM SERPL-SCNC: 139 MMOL/L (ref 136–145)
SYSTOLIC BLOOD PRESSURE - MUSE: NORMAL
T AXIS - MUSE: 65 DEGREES
VENTRICULAR RATE- MUSE: 58 BPM
WBC: 6.3 THOU/UL (ref 4–11)

## 2020-08-26 ASSESSMENT — MIFFLIN-ST. JEOR: SCORE: 1385.09

## 2020-09-01 ENCOUNTER — TRANSFERRED RECORDS (OUTPATIENT)
Dept: HEALTH INFORMATION MANAGEMENT | Facility: CLINIC | Age: 40
End: 2020-09-01

## 2020-09-17 ENCOUNTER — TRANSFERRED RECORDS (OUTPATIENT)
Dept: HEALTH INFORMATION MANAGEMENT | Facility: CLINIC | Age: 40
End: 2020-09-17

## 2020-12-06 ENCOUNTER — HEALTH MAINTENANCE LETTER (OUTPATIENT)
Age: 40
End: 2020-12-06

## 2021-02-12 ENCOUNTER — OFFICE VISIT - HEALTHEAST (OUTPATIENT)
Dept: INTERNAL MEDICINE | Facility: CLINIC | Age: 41
End: 2021-02-12

## 2021-02-12 DIAGNOSIS — M79.675 PAIN IN TOES OF BOTH FEET: ICD-10-CM

## 2021-02-12 DIAGNOSIS — Z13.0 SCREENING FOR ENDOCRINE, NUTRITIONAL, METABOLIC AND IMMUNITY DISORDER: ICD-10-CM

## 2021-02-12 DIAGNOSIS — Z13.29 SCREENING FOR ENDOCRINE, NUTRITIONAL, METABOLIC AND IMMUNITY DISORDER: ICD-10-CM

## 2021-02-12 DIAGNOSIS — Z13.21 SCREENING FOR ENDOCRINE, NUTRITIONAL, METABOLIC AND IMMUNITY DISORDER: ICD-10-CM

## 2021-02-12 DIAGNOSIS — Z00.00 ANNUAL PHYSICAL EXAM: ICD-10-CM

## 2021-02-12 DIAGNOSIS — Z13.228 SCREENING FOR ENDOCRINE, NUTRITIONAL, METABOLIC AND IMMUNITY DISORDER: ICD-10-CM

## 2021-02-12 DIAGNOSIS — T33.90XD FROSTBITE, SUBSEQUENT ENCOUNTER: ICD-10-CM

## 2021-02-12 DIAGNOSIS — K21.9 GASTROESOPHAGEAL REFLUX DISEASE WITHOUT ESOPHAGITIS: ICD-10-CM

## 2021-02-12 DIAGNOSIS — M79.674 PAIN IN TOES OF BOTH FEET: ICD-10-CM

## 2021-02-12 DIAGNOSIS — K05.10 GUM INFLAMMATION: ICD-10-CM

## 2021-02-12 DIAGNOSIS — Z11.51 SPECIAL SCREENING EXAMINATION FOR HUMAN PAPILLOMAVIRUS (HPV): ICD-10-CM

## 2021-02-12 DIAGNOSIS — M79.641 PAIN IN BOTH HANDS: ICD-10-CM

## 2021-02-12 DIAGNOSIS — M79.642 PAIN IN BOTH HANDS: ICD-10-CM

## 2021-02-12 LAB
ALBUMIN SERPL-MCNC: 4.6 G/DL (ref 3.5–5)
ALP SERPL-CCNC: 77 U/L (ref 45–120)
ALT SERPL W P-5'-P-CCNC: 14 U/L (ref 0–45)
ANION GAP SERPL CALCULATED.3IONS-SCNC: 10 MMOL/L (ref 5–18)
AST SERPL W P-5'-P-CCNC: 19 U/L (ref 0–40)
BASOPHILS # BLD AUTO: 0 THOU/UL (ref 0–0.2)
BASOPHILS NFR BLD AUTO: 1 % (ref 0–2)
BILIRUB SERPL-MCNC: 0.6 MG/DL (ref 0–1)
BUN SERPL-MCNC: 12 MG/DL (ref 8–22)
C REACTIVE PROTEIN LHE: <0.1 MG/DL (ref 0–0.8)
CALCIUM SERPL-MCNC: 9.4 MG/DL (ref 8.5–10.5)
CHLORIDE BLD-SCNC: 104 MMOL/L (ref 98–107)
CHOLEST SERPL-MCNC: 181 MG/DL
CO2 SERPL-SCNC: 25 MMOL/L (ref 22–31)
CREAT SERPL-MCNC: 0.76 MG/DL (ref 0.6–1.1)
EOSINOPHIL # BLD AUTO: 0.3 THOU/UL (ref 0–0.4)
EOSINOPHIL NFR BLD AUTO: 6 % (ref 0–6)
ERYTHROCYTE [DISTWIDTH] IN BLOOD BY AUTOMATED COUNT: 11.7 % (ref 11–14.5)
ERYTHROCYTE [SEDIMENTATION RATE] IN BLOOD BY WESTERGREN METHOD: 7 MM/HR (ref 0–20)
FASTING STATUS PATIENT QL REPORTED: YES
GFR SERPL CREATININE-BSD FRML MDRD: >60 ML/MIN/1.73M2
GLUCOSE BLD-MCNC: 92 MG/DL (ref 70–125)
HCT VFR BLD AUTO: 42.5 % (ref 35–47)
HDLC SERPL-MCNC: 42 MG/DL
HGB BLD-MCNC: 14.4 G/DL (ref 12–16)
IMM GRANULOCYTES # BLD: 0 THOU/UL
IMM GRANULOCYTES NFR BLD: 0 %
LDLC SERPL CALC-MCNC: 128 MG/DL
LYMPHOCYTES # BLD AUTO: 1.1 THOU/UL (ref 0.8–4.4)
LYMPHOCYTES NFR BLD AUTO: 25 % (ref 20–40)
MCH RBC QN AUTO: 31 PG (ref 27–34)
MCHC RBC AUTO-ENTMCNC: 33.9 G/DL (ref 32–36)
MCV RBC AUTO: 92 FL (ref 80–100)
MONOCYTES # BLD AUTO: 0.4 THOU/UL (ref 0–0.9)
MONOCYTES NFR BLD AUTO: 8 % (ref 2–10)
NEUTROPHILS # BLD AUTO: 2.5 THOU/UL (ref 2–7.7)
NEUTROPHILS NFR BLD AUTO: 60 % (ref 50–70)
PLATELET # BLD AUTO: 214 THOU/UL (ref 140–440)
PMV BLD AUTO: 9.6 FL (ref 7–10)
POTASSIUM BLD-SCNC: 4.3 MMOL/L (ref 3.5–5)
PROT SERPL-MCNC: 7.6 G/DL (ref 6–8)
RBC # BLD AUTO: 4.64 MILL/UL (ref 3.8–5.4)
SODIUM SERPL-SCNC: 139 MMOL/L (ref 136–145)
TRIGL SERPL-MCNC: 56 MG/DL
WBC: 4.3 THOU/UL (ref 4–11)

## 2021-02-12 ASSESSMENT — MIFFLIN-ST. JEOR: SCORE: 1405.27

## 2021-02-15 LAB
HPV SOURCE: NORMAL
HUMAN PAPILLOMA VIRUS 16 DNA: NEGATIVE
HUMAN PAPILLOMA VIRUS 18 DNA: NEGATIVE
HUMAN PAPILLOMA VIRUS FINAL DIAGNOSIS: NORMAL
HUMAN PAPILLOMA VIRUS OTHER HR: NEGATIVE
SPECIMEN DESCRIPTION: NORMAL

## 2021-02-16 LAB — ANA SER QL: 2.9 U

## 2021-02-17 ENCOUNTER — OFFICE VISIT - HEALTHEAST (OUTPATIENT)
Dept: INTERNAL MEDICINE | Facility: CLINIC | Age: 41
End: 2021-02-17

## 2021-02-17 ENCOUNTER — TRANSCRIBE ORDERS (OUTPATIENT)
Dept: OTHER | Age: 41
End: 2021-02-17

## 2021-02-17 DIAGNOSIS — M79.675 PAIN IN TOES OF BOTH FEET: ICD-10-CM

## 2021-02-17 DIAGNOSIS — M79.642 PAIN IN BOTH HANDS: ICD-10-CM

## 2021-02-17 DIAGNOSIS — M79.641 PAIN IN BOTH HANDS: Primary | ICD-10-CM

## 2021-02-17 DIAGNOSIS — T33.90XD FROSTBITE, SUBSEQUENT ENCOUNTER: ICD-10-CM

## 2021-02-17 DIAGNOSIS — M79.641 PAIN IN BOTH HANDS: ICD-10-CM

## 2021-02-17 DIAGNOSIS — M79.642 PAIN IN BOTH HANDS: Primary | ICD-10-CM

## 2021-02-17 DIAGNOSIS — T33.90XA FROSTBITE: ICD-10-CM

## 2021-02-17 DIAGNOSIS — M79.674 PAIN IN TOES OF BOTH FEET: ICD-10-CM

## 2021-02-19 LAB
BKR LAB AP ABNORMAL BLEEDING: NO
BKR LAB AP BIRTH CONTROL/HORMONES: NORMAL
BKR LAB AP CERVICAL APPEARANCE: NORMAL
BKR LAB AP GYN ADEQUACY: NORMAL
BKR LAB AP GYN INTERPRETATION: NORMAL
BKR LAB AP HPV REFLEX: NORMAL
BKR LAB AP LMP: NORMAL
BKR LAB AP PATIENT STATUS: NORMAL
BKR LAB AP PREVIOUS ABNORMAL: NORMAL
BKR LAB AP PREVIOUS NORMAL: 2003
HIGH RISK?: NO
PATH REPORT.COMMENTS IMP SPEC: NORMAL
RESULT FLAG (HE HISTORICAL CONVERSION): NORMAL

## 2021-02-23 ENCOUNTER — COMMUNICATION - HEALTHEAST (OUTPATIENT)
Dept: FAMILY MEDICINE | Facility: CLINIC | Age: 41
End: 2021-02-23

## 2021-04-22 ENCOUNTER — OFFICE VISIT - HEALTHEAST (OUTPATIENT)
Dept: VASCULAR SURGERY | Facility: CLINIC | Age: 41
End: 2021-04-22

## 2021-04-22 DIAGNOSIS — M79.673 FOOT PAIN: ICD-10-CM

## 2021-04-22 DIAGNOSIS — M79.643 HAND PAIN: ICD-10-CM

## 2021-04-22 LAB
ALBUMIN SERPL-MCNC: 4.8 G/DL (ref 3.5–5)
ALP SERPL-CCNC: 70 U/L (ref 45–120)
ALT SERPL W P-5'-P-CCNC: 13 U/L (ref 0–45)
ANION GAP SERPL CALCULATED.3IONS-SCNC: 9 MMOL/L (ref 5–18)
AST SERPL W P-5'-P-CCNC: 18 U/L (ref 0–40)
BASOPHILS # BLD AUTO: 0.1 THOU/UL (ref 0–0.2)
BASOPHILS NFR BLD AUTO: 1 % (ref 0–2)
BILIRUB SERPL-MCNC: 0.5 MG/DL (ref 0–1)
BUN SERPL-MCNC: 17 MG/DL (ref 8–22)
C REACTIVE PROTEIN LHE: <0.1 MG/DL (ref 0–0.8)
CALCIUM SERPL-MCNC: 9.8 MG/DL (ref 8.5–10.5)
CHLORIDE BLD-SCNC: 104 MMOL/L (ref 98–107)
CO2 SERPL-SCNC: 28 MMOL/L (ref 22–31)
CREAT SERPL-MCNC: 0.75 MG/DL (ref 0.6–1.1)
EOSINOPHIL # BLD AUTO: 0.3 THOU/UL (ref 0–0.4)
EOSINOPHIL NFR BLD AUTO: 5 % (ref 0–6)
ERYTHROCYTE [DISTWIDTH] IN BLOOD BY AUTOMATED COUNT: 11.8 % (ref 11–14.5)
ERYTHROCYTE [SEDIMENTATION RATE] IN BLOOD BY WESTERGREN METHOD: 5 MM/HR (ref 0–20)
GFR SERPL CREATININE-BSD FRML MDRD: >60 ML/MIN/1.73M2
GLUCOSE BLD-MCNC: 87 MG/DL (ref 70–125)
HCT VFR BLD AUTO: 44.2 % (ref 35–47)
HGB BLD-MCNC: 15.1 G/DL (ref 12–16)
IMM GRANULOCYTES # BLD: 0 THOU/UL
IMM GRANULOCYTES NFR BLD: 0 %
LYMPHOCYTES # BLD AUTO: 1.4 THOU/UL (ref 0.8–4.4)
LYMPHOCYTES NFR BLD AUTO: 23 % (ref 20–40)
MCH RBC QN AUTO: 30.9 PG (ref 27–34)
MCHC RBC AUTO-ENTMCNC: 34.2 G/DL (ref 32–36)
MCV RBC AUTO: 91 FL (ref 80–100)
MONOCYTES # BLD AUTO: 0.4 THOU/UL (ref 0–0.9)
MONOCYTES NFR BLD AUTO: 6 % (ref 2–10)
NEUTROPHILS # BLD AUTO: 4 THOU/UL (ref 2–7.7)
NEUTROPHILS NFR BLD AUTO: 64 % (ref 50–70)
PLATELET # BLD AUTO: 235 THOU/UL (ref 140–440)
PMV BLD AUTO: 9.6 FL (ref 7–10)
POTASSIUM BLD-SCNC: 4.6 MMOL/L (ref 3.5–5)
PROT SERPL-MCNC: 7.7 G/DL (ref 6–8)
RBC # BLD AUTO: 4.88 MILL/UL (ref 3.8–5.4)
RHEUMATOID FACT SERPL-ACNC: <15 IU/ML (ref 0–30)
SODIUM SERPL-SCNC: 141 MMOL/L (ref 136–145)
TSH SERPL DL<=0.005 MIU/L-ACNC: 1.2 UIU/ML (ref 0.3–5)
WBC: 6.2 THOU/UL (ref 4–11)

## 2021-04-26 LAB — ANA SER QL: 3.4 U

## 2021-04-28 LAB
DNA (DS) ANTIBODY - HISTORICAL: 11 IU
JO-1 AUTOANTIBODIES - HISTORICAL: 0 EU
SCL-70 AUTOANTIBODIES - HISTORICAL: 0 EU
SM (SMITH AUTOANTIBODIES - HISTORICAL: 5 EU
SM/RNP AUTOANTIBODIES - HISTORICAL: 2 EU
SS-A/RO AUTOANTIBODIES - HISTORICAL: 1 EU
SS-B/LA AUTOANTIBODIES - HISTORICAL: 0 EU

## 2021-05-04 ENCOUNTER — RECORDS - HEALTHEAST (OUTPATIENT)
Dept: VASCULAR ULTRASOUND | Facility: CLINIC | Age: 41
End: 2021-05-04

## 2021-05-04 DIAGNOSIS — M79.673 PAIN IN UNSPECIFIED FOOT: ICD-10-CM

## 2021-05-04 DIAGNOSIS — M79.643 PAIN IN UNSPECIFIED HAND: ICD-10-CM

## 2021-05-27 VITALS
TEMPERATURE: 98 F | SYSTOLIC BLOOD PRESSURE: 105 MMHG | HEART RATE: 67 BPM | RESPIRATION RATE: 15 BRPM | DIASTOLIC BLOOD PRESSURE: 62 MMHG

## 2021-05-28 ENCOUNTER — COMMUNICATION - HEALTHEAST (OUTPATIENT)
Dept: VASCULAR SURGERY | Facility: CLINIC | Age: 41
End: 2021-05-28

## 2021-05-28 DIAGNOSIS — M79.643 HAND PAIN: ICD-10-CM

## 2021-05-28 DIAGNOSIS — M79.673 FOOT PAIN: ICD-10-CM

## 2021-05-28 DIAGNOSIS — I73.00 RAYNAUD DISEASE: ICD-10-CM

## 2021-06-04 VITALS
DIASTOLIC BLOOD PRESSURE: 60 MMHG | WEIGHT: 148.25 LBS | HEART RATE: 66 BPM | RESPIRATION RATE: 16 BRPM | HEIGHT: 67 IN | BODY MASS INDEX: 23.27 KG/M2 | SYSTOLIC BLOOD PRESSURE: 90 MMHG | OXYGEN SATURATION: 98 %

## 2021-06-05 VITALS
DIASTOLIC BLOOD PRESSURE: 72 MMHG | HEIGHT: 67 IN | SYSTOLIC BLOOD PRESSURE: 107 MMHG | BODY MASS INDEX: 23.97 KG/M2 | WEIGHT: 152.7 LBS | HEART RATE: 69 BPM

## 2021-06-10 NOTE — PROGRESS NOTES
[facility]  [address] [address]  Dept: [phone]  Dept Fax: [phone]  Primary Provider: Jayda Dixon CNP  Pre-op Performing Provider: JAYDA DIXON    PREOPERATIVE EVALUATION:  Today's date: 8/26/2020    Lindsay Payne is a 39 y.o. female who presents for a preoperative evaluation.    Surgical Information: Dr. Issa Mak, [facility]  Procedure: Right knee, meniscus repair at [facility]  Fax number for surgical facility: [phone].  Type of Anesthesia Anticipated: General    Subjective     HPI related to upcoming procedure: The patient reports a long standing history of right knee pain. She reports that she was up in the [place] this [place] was squatting a lot and her right knee started to hurt much more and became swollen.    She reports being an avid runner, and not being able to do this lately due to her knee pain.    She went in to see Orthopedics, and she had and MRI done which showed a complex tear of the right meniscus, which will be repaired.    She reports a past surgical history of an appendectomy about 11 years ago, she had no issues with anesthesia with this.    She denies any alcohol, tobacco, drug use.    She is not currently on any medications.    She reports her COVID testing is set up for tomorrow in [place].    She reports she currently has an IUD that was placed, a Mirena, this was placed 4 to 5 years ago.  She reports having very light infrequent periods since her Mirena was placed.    She is currently , has 2 kids who are alive and well.    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  She currently is working from home.    She reports that her mother has a history of melanoma, had the mole removed in 2009 from her left shoulder, but the melanoma returned and she ended up with brain metastasis, passed away in 2014.  She denies  any other chronic medical history in her family.    She denies a history of sleep apnea.    She denies a fever, chills, cough, shortness of breath, chest pain, chest pressure, nausea, vomiting, abdominal pain, urinary, or bowel symptoms today.  No flowsheet data found.    Patient does not have a Health Care Directive or Living Will: Discussed advance care planning with patient; however, patient declined at this time.    RX [place] ([place] reviewed:  reviewed - no concerns  269}  Chronic medical problems: None.    Review of Systems  CONSTITUTIONAL: NEGATIVE for fever, chills, change in weight  INTEGUMENTARY/SKIN: NEGATIVE for worrisome rashes, moles or lesions  EYES: NEGATIVE for vision changes or irritation  ENT/MOUTH: NEGATIVE for ear, mouth and throat problems  RESP: NEGATIVE for significant cough or SOB  BREAST: NEGATIVE for masses, tenderness or discharge  CV: NEGATIVE for chest pain, palpitations or peripheral edema  GI: NEGATIVE for nausea, abdominal pain, heartburn, or change in bowel habits  : NEGATIVE for frequency, dysuria, or hematuria  MUSCULOSKELETAL: NEGATIVE for significant arthralgias or myalgia  NEURO: NEGATIVE for weakness, dizziness or paresthesias  ENDOCRINE: NEGATIVE for temperature intolerance, skin/hair changes  HEME: NEGATIVE for bleeding problems  PSYCHIATRIC: NEGATIVE for changes in mood or affect    Patient Active Problem List    Diagnosis Date Noted     Gastroesophageal reflux disease, esophagitis presence not specified 12/19/2017     Encounter for screening for cardiovascular disorders 06/07/2013     Past Medical History:   Diagnosis Date     Tear of right meniscus as current injury 2020     Past Surgical History:   Procedure Laterality Date     APPENDECTOMY  2009     No current outpatient medications on file.     No current facility-administered medications for this visit.        No Known Allergies    Social History     Tobacco Use     Smoking status: Never Smoker      "Smokeless tobacco: Never Used   Substance Use Topics     Alcohol use: Not Currently      Family History   Problem Relation Age of Onset     Melanoma Mother      Social History     Substance and Sexual Activity   Drug Use Never           Objective   BP 90/60   Pulse 66   Resp 16   Ht 5' 7\" (1.702 m)   Wt 148 lb 4 oz (67.2 kg)   SpO2 98%   BMI 23.22 kg/m    Physical Exam    GENERAL APPEARANCE: healthy, alert and no distress     EYES: EOMI, PERRL     HENT: ear canals and TM's normal and nose and mouth without ulcers or lesions     NECK: no adenopathy, no asymmetry, masses, or scars and thyroid normal to palpation     RESP: lungs clear to auscultation - no rales, rhonchi or wheezes     BREAST: normal without masses, tenderness or nipple discharge and no palpable axillary masses or adenopathy     CV: regular rates and rhythm, normal S1 S2, no S3 or S4 and no murmur, click or rub     ABDOMEN:  soft, nontender, no HSM or masses and bowel sounds normal     MS: extremities normal- no gross deformities noted, no evidence of inflammation in joints, FROM in all extremities.     SKIN: no suspicious lesions or rashes     NEURO: Normal strength and tone, sensory exam grossly normal, mentation intact and speech normal     PSYCH: mentation appears normal. and affect normal/bright     LYMPHATICS: No cervical adenopathy    Recent Labs   Lab Test 08/26/20  1051   HGB 14.6           PRE-OP Diagnostics:   Recent Results (from the past 24 hour(s))   Electrocardiogram Perform and Read    Collection Time: 08/26/20 10:32 AM   Result Value Ref Range    SYSTOLIC BLOOD PRESSURE      DIASTOLIC BLOOD PRESSURE      VENTRICULAR RATE 58 BPM    ATRIAL RATE 58 BPM    P-R INTERVAL 172 ms    QRS DURATION 80 ms    Q-T INTERVAL 396 ms    QTC CALCULATION (BEZET) 388 ms    P Axis 39 degrees    R AXIS 77 degrees    T AXIS 65 degrees    MUSE DIAGNOSIS       Sinus bradycardia  Otherwise normal ECG  No previous ECGs available     HM1 (CBC with " Diff)    Collection Time: 08/26/20 10:51 AM   Result Value Ref Range    WBC 6.3 4.0 - 11.0 thou/uL    RBC 4.59 3.80 - 5.40 mill/uL    Hemoglobin 14.6 12.0 - 16.0 g/dL    Hematocrit 42.5 35.0 - 47.0 %    MCV 92 80 - 100 fL    MCH 31.9 27.0 - 34.0 pg    MCHC 34.5 32.0 - 36.0 g/dL    RDW 11.0 11.0 - 14.5 %    Platelets 236 140 - 440 thou/uL    MPV 7.8 7.0 - 10.0 fL    Neutrophils % 64 50 - 70 %    Lymphocytes % 24 20 - 40 %    Monocytes % 5 2 - 10 %    Eosinophils % 6 0 - 6 %    Basophils % 0 0 - 2 %    Neutrophils Absolute 4.0 2.0 - 7.7 thou/uL    Lymphocytes Absolute 1.5 0.8 - 4.4 thou/uL    Monocytes Absolute 0.3 0.0 - 0.9 thou/uL    Eosinophils Absolute 0.4 0.0 - 0.4 thou/uL    Basophils Absolute 0.0 0.0 - 0.2 thou/uL     EKG: sinus bradycardia, read by provider. [id number]}     Assessment & Plan   The proposed surgical procedure is considered LOW risk.    REVISED CARDIAC RISK INDEX   The patient has the following serious cardiovascular risks for perioperative complications:  No serious cardiac risks = 0 points    INTERPRETATION: 0 points: Class I (very low risk - 0.4% complication rate)    1. Pre-op exam: EKG read by provider showed sinus bradycardia, no concerns. CBC was normal. Pregnancy test was negative. [facility] is processing.   - Electrocardiogram Perform and Read  - HM1(CBC and Differential)  - Basic Metabolic Panel  - Beta-hCG, Qualitative, Serum  - No NSAIDS (Aleve, Advil, Ibuprofen) prior to surgery.     2. Tear of lateral cartilage or meniscus of knee, current, right, subsequent encounter: MRI confirmation. She will have this repaired on 09/01/2020. Tylenol as needed for pain control prior to surgery only.      The patient has the following additional risks and recommendations for perioperative complications:   - No identified additional risk factors other than previously addressed     MEDICATION INSTRUCTIONS:  Patient is on no chronic medications    RECOMMENDATION:  APPROVAL GIVEN to proceed with proposed  procedure, without further diagnostic evaluation.    Return in about 6 months (around 2/26/2021) for Annual physical.    Signed Electronically by: Vonnie Dixon CNP    Copy of this evaluation report is provided to requesting physician.    [email or web address]    [facility] Preop Guidelines    Revised Cardiac Risk Index

## 2021-06-10 NOTE — PATIENT INSTRUCTIONS - HE
Keep your COVID testing as scheduled.    No NSAIDs (Aleve, Advil, ibuprofen) prior to surgery.    Your labs are pending at this time, they will be released via my chart once they are available.    Follow-up before surgery if having any new symptoms.

## 2021-06-15 NOTE — PROGRESS NOTES
Lindsay Payne is a 40 y.o. female who is being evaluated via a billable telephone visit.      What phone number would you like to be contacted at? [phone]  How would you like to obtain your AVS? AVS Preference: Mail a copy.    Assessment & Plan     Lindsay was seen today for follow-up.    Diagnoses and all orders for this visit:    Pain in both hands  Pain in toes of both feet  Frostbite, subsequent encounter  Issues continue. She has seen dermatology for this, they prescribed Clobetasol, which has not been helping. Itching, swollen toes, blisters, and pain when in cold temperatures. Toes were very cold at last appointment to exam, so were hands. Hands are also sensitive when in the cold, even with use of gloves and hand warmers. Will refer to vascular medicine for further evaluation.   -     Ambulatory referral to Vascular Medicine       Return if symptoms worsen or fail to improve.    Vonnie Dixon, [facility]    Subjective   Lindsay Payne is 40 y.o. and presents today for the following health issues   HPI Discussed the patient's labs, all were essentially normal. Encouraged her to continue working on diet and exercise, as her HDL was low.    She reports that her toes continue to be an issue. MILAD, ESR, and CRP were normal. She reports that they are now swollen, red, and blistered. The clobetasol has not been helping. She continues to react in the cold. This has been going on since April of last year. Will refer to vascular.         Phone call duration: 10 minutes

## 2021-06-16 NOTE — PROGRESS NOTES
Vascular Medicine Progress note    Dr Uzair Esqueda MD, Vascular Medicine      Lindsay Payne    Medical Record #:  [id number]    Date of Service: 04/22/2021     Date last seen:  Visit date not found    PRIMARY CARE PROVIDER: Vonnie Dixon CNP      IMPRESSION/PLAN: Patient is here complaining of discoloration of both lower extremities and cold sensation in both hands.  Patient started to have this condition almost 2 years ago it is getting worse  Patient has no signs or symptoms suggestive of autoimmune disease or rheumatological disease  Patient has no complaint of rashes, joint pain,  or GI constitutional symptoms, no history of constitutional symptoms    Patient has no signs or symptoms suggestive of thyroid disease, no signs or symptoms suggestive of vasculitis  Patient has no other symptoms or signs apart from what is mentioned above    DISPOSITION:  1-check CBC, CMP, ESR, C-reactive protein, MILAD, rheumatoid factor, TSH  2-GWEN with PPG/toe pressures  3-follow-up with the patient once lab results are available  No need for cold challenge test as the patient is already having very cold extremities      ______________________________________________________________________    Subjective:    ALLERGIES:  Patient has no known allergies.    MEDS:    Current Outpatient Medications:      fexofenadine (ALLEGRA ALLERGY) 60 MG tablet, Take 60 mg by mouth as needed., Disp: , Rfl:      omeprazole (PRILOSEC) 20 MG capsule, Take 1 capsule (20 mg total) by mouth daily before breakfast., Disp: 90 capsule, Rfl: 3    REVIEW OF SYSTEMS:    A 12 point ROS was reviewed and except for what is listed in the HPI above, all others are negative    Objective:    PE:  /62   Pulse 67   Temp 98  F (36.7  C)   Resp 15   Wt Readings from Last 1 Encounters:   02/12/21 152 lb 11.2 oz (69.3 kg)     There is no height or weight on file to calculate BMI.    EXAM:  GENERAL: This is a well-developed 40 y.o. female who appears  her stated age  EYES: Grossly normal.  MOUTH: Buccal mucosa normal   CARDIAC:  Normal S1 and S2, no Murmur  CHEST/LUNG:  Clear lung fields bilaterally  GASTROINTESINAL (ABDOMEN):Soft, non-tender, B/S present, no pulsatile mass     MUSCULOSKELETAL: Grossly normal and both lower extremities are intact.  HEME/LYMPH: No lymphedema  NEUROLOGIC: Focally intact, Alert and oriented x 3.   PSYCH: appropriate affect  INTEGUMENT: No open lesions or ulcers  Pulse Exam: Palpable DP and PT pulses  Skin changes consistent with chilblains/pernio as the patient is having blisters, itching, and discoloration of the toes  Circumferential measures:  No flowsheet data found.        DIAGNOSTIC STUDIES:     No results found.  I personally reviewed the following imaging today and those on care everywhere, if indicated    LABS:      Sodium   Date Value Ref Range Status   02/12/2021 139 136 - 145 mmol/L Final   08/26/2020 139 136 - 145 mmol/L Final     Potassium   Date Value Ref Range Status   02/12/2021 4.3 3.5 - 5.0 mmol/L Final   08/26/2020 4.3 3.5 - 5.0 mmol/L Final     Chloride   Date Value Ref Range Status   02/12/2021 104 98 - 107 mmol/L Final   08/26/2020 104 98 - 107 mmol/L Final     BUN   Date Value Ref Range Status   02/12/2021 12 8 - 22 mg/dL Final   08/26/2020 10 8 - 22 mg/dL Final     Creatinine   Date Value Ref Range Status   02/12/2021 0.76 0.60 - 1.10 mg/dL Final   08/26/2020 0.77 0.60 - 1.10 mg/dL Final     Hemoglobin   Date Value Ref Range Status   02/12/2021 14.4 12.0 - 16.0 g/dL Final   08/26/2020 14.6 12.0 - 16.0 g/dL Final     Platelets   Date Value Ref Range Status   02/12/2021 214 140 - 440 thou/uL Final   08/26/2020 236 140 - 440 thou/uL Final       Total time spent 25   (15,25,35) minutes face to face with patient with more than 50% time spent in counseling and coordination of care.    Uzair Esqueda MD  VASCULAR MEDICINE

## 2021-06-16 NOTE — PROGRESS NOTES
"CONSULT Memo hand/foot pain. History of frostbite in bilateral feet. symptoms since 4/2020 when out in the cold, painful toes, swelling, uncontrolable itching. Cold feet, toes, and hands. Possibly some vasculitis. Clobetasol has not helped. Vonnie Dixon NP referring.    Pt states right hand is always colder. Cold to the touch. Pt states the only time her hands are warm is when she is working out \"like dripping sweat\"    Pt states her family wants a hug but never for her to touch them with her hands because they are always so cold.    Pt states pain is not all the time \"I have flair ups to my feet and I get boils. It is uncomfortable to walk. They itch all the time. I cannot sleep. That medication helped the first time but then it didn't. The only thing that works is if I wrap my feet\"    Pt wants to prevent the flair ups.    "

## 2021-06-18 NOTE — PATIENT INSTRUCTIONS - HE
Patient Instructions by Suzy Black RN at 4/22/2021 11:00 AM     Author: Suzy Black RN Service: -- Author Type: Registered Nurse    Filed: 4/22/2021 12:08 PM Encounter Date: 4/22/2021 Status: Addendum    : Suzy Black RN (Registered Nurse)    Related Notes: Original Note by Suzy Black RN (Registered Nurse) filed at 4/22/2021 11:59 AM       Ankle-Brachial Index (GWEN) or Physiologic Test    Description  An ankle-brachial index test is relatively pain free. Blood pressure cuffs of various sizes are placed on your thigh, calf, foot and toes.  Similar to having your blood pressure checked with an arm cuff, as the technician inflates the cuffs, they progressively tighten and are then quickly released.  You may feel some discomfort, but generally for less than 60 seconds for each measurement. You will be asked to remove your socks and shoes and possibly your pants or shorts. Gowns will be provided. It usually takes about 30-60 minutes.   Depending on the initial readings and patient symptoms, you may be asked to perform a light walk on a treadmill.  The technician will apply ultrasound gel, usually warmed for your comfort, to your ankles and wrists. Through the gel, the technician will use a small hand-held device that emits sound waves.  Risks  There are typically no side effects or complications associated with a physiologic study.  How to Prepare  Eat and take medications as usual.  There is no preparation required for an ankle-brachial index (GWEN) or physiologic exam.  What Can I Expect After the Test?  The technician will send the ultrasound images to your vascular surgeon for evaluation. Typically, a report is available in 2-3 days. If anything critical is found, it is standard practice to notify the vascular surgeon immediately.  Reference: https://vascular.org/patient-resources/vascular-tests  Patient Education     Norvasc Oral Tablet 2.5 mg  Uses  This medicine is used for the following  purposes:    angina    high blood pressure    Raynaud's disease  Instructions  This medicine may be taken with or without food.  It is very important that you take the medicine at about the same time every day. It will work best if you do this.  Keep the medicine at room temperature. Avoid heat and direct light.  It is important that you keep taking each dose of this medicine on time even if you are feeling well.  If you forget to take a dose on time, take it as soon as you remember. If it is almost time for the next dose, do not take the missed dose. Return to your normal dosing schedule. Do not take 2 doses of this medicine at one time.  Please tell your doctor and pharmacist about all the medicines you take. Include both prescription and over-the-counter medicines. Also tell them about any vitamins, herbal medicines, or anything else you take for your health.  It is very important that you keep all appointments for medical exams and tests while on this medicine.  Cautions  Do not use the medication any more than instructed.  If possible, avoid using with marijuana or other medicines that can cause dizziness or drowsiness. These include allergy/cold products, muscle relaxers, sleep aids, and pain relievers.  Your ability to stay alert or to react quickly may be impaired by this medicine. Do not drive or operate machinery until you know how this medicine will affect you.  Please check with your doctor before drinking alcohol while on this medicine.  Tell the doctor or pharmacist if you are pregnant, planning to be pregnant, or breastfeeding.  Ask your pharmacist if this medicine can interact with any of your other medicines. Be sure to tell them about all the medicines you take.  Do not start or stop any other medicines without first speaking to your doctor or pharmacist.  Do not share this medicine with anyone who has not been prescribed this medicine.  Side Effects  The following is a list of some common side  effects from this medicine. Please speak with your doctor about what you should do if you experience these or other side effects.    dizziness    swelling of the legs, feet, and hands    lack of energy and tiredness    feeling of heat or flushing    low blood pressure  Call your doctor or get medical help right away if you notice any of these more serious side effects:    worsening chest pain or crushing feeling    fainting    fast or irregular heart beats    jaw pain    shortness of breath  A few people may have an allergic reactions to this medicine. Symptoms can include difficulty breathing, skin rash, itching, swelling, or severe dizziness. If you notice any of these symptoms, seek medical help quickly.  Extra  Please speak with your doctor, nurse, or pharmacist if you have any questions about this medicine.  [email or web address]  IMPORTANT NOTE: This document tells you briefly how to take your medicine, but it does not tell you all there is to know about it.Your doctor or pharmacist may give you other documents about your medicine. Please talk to them if you have any questions.Always follow their advice. There is a more complete description of this medicine available in English.Scan this code on your smartphone or tablet or use the web address below. You can also ask your pharmacist for a printout. If you have any questions, please ask your pharmacist.     2021 [email or web address].

## 2021-06-18 NOTE — PATIENT INSTRUCTIONS - HE
Patient Instructions by Vonnie Dxion CNP at 2/12/2021  9:00 AM     Author: Vonnie Dixon CNP Service: -- Author Type: Nurse Practitioner    Filed: 2/12/2021  9:44 AM Encounter Date: 2/12/2021 Status: Addendum    : Vonnie Dixon CNP (Nurse Practitioner)    Related Notes: Original Note by Vonnie Dixon CNP (Nurse Practitioner) filed at 2/12/2021  9:43 AM       Your Omeprazole was sent into pharmacy. Start this daily.    Clobetasol topically to your toes for the next 14 days then discontinue.    Your labs are processing at this time. We will talk Wednesday next week for follow up.     Patient Education     Tips to Control Acid Reflux    To control acid reflux, youll need to make some basic diet and lifestyle changes. The simple steps outlined below may be all youll need to ease discomfort.  Watch what you eat    Don't have fatty foods or spicy foods.    Eat fewer acidic foods, such as citrus and tomato-based foods. These can increase symptoms.    Limit drinking alcohol, caffeine, and fizzy beverages. All increase acid reflux.    Try limiting chocolate, peppermint, and spearmint. These can make acid reflux worse in some people.    Watch when you eat    Don't lie down for 3 hours after eating.    Don't snack before going to bed.    Raise your head  Raising your head and upper body by 4 to 6 inches helps limit reflux when youre lying down. Put blocks under the head of your bed frame or a wedge under your mattress to raise it.  Other changes    Lose weight, if you need to    Dont exercise near bedtime    Don't wear tight-fitting clothes    Limit aspirin and ibuprofen    Stop smoking    Date Last Reviewed: 7/1/2016 2000-2019 [employer]. [address] [place] [address], [place] [address]. All rights reserved. This information is not intended as a substitute for professional medical care. Always follow your healthcare professional's instructions.

## 2021-06-21 NOTE — LETTER
Letter by Ruma Dietrich RN at      Author: Ruma Dietrich RN Service: -- Author Type: --    Filed:  Encounter Date: 2/23/2021 Status: (Other)         Lindsay Payne  [address] [place] [address]             February 23, 2021         Dear Ms. Payne,    We are happy to inform you that your recent Pap smear and Human Papillomavirus (HPV) test results are normal and negative.    It is recommended that you have your next Pap smear and Human Papillomavirus (HPV) test in 5 years. You will also need to return to the clinic every year for an annual wellness visit.    If you have additional questions regarding this result, please contact our office and we will be happy to assist you.      Sincerely,    Your [facility] Team

## 2021-06-25 NOTE — TELEPHONE ENCOUNTER
Informed her Dr. Esqueda reviewed her US and that it was normal, showing good blood flow to her legs.     Lindsay states that the 2.5 mg amlodipine has done nothing for her. Writer informed will let Dr. Esqueda know.    Dr. Esqueda would like to up dose to 5mg daily. Informed Lindsay, will call in 3weeks to check.

## 2021-06-26 NOTE — TELEPHONE ENCOUNTER
Pt states that she didn't start taking the 5mg of amlodipine until about a week ago.     Informed pt will call in 2 weeks to see how it is going.     No further questions

## 2021-06-30 NOTE — PROGRESS NOTES
Progress Notes by Vonnie Dixon CNP at 2021  9:00 AM     Author: Vonnie Dixon CNP Service: -- Author Type: Nurse Practitioner    Filed: 2021 10:12 AM Encounter Date: 2021 Status: Signed    : Vonnie Dixon CNP (Nurse Practitioner)       FEMALE PREVENTATIVE EXAM    Assessment and Plan:     Patient has been advised of split billing requirements and indicates understanding: Yes    1. Annual physical exam: Completed today. Fasted labs drawn. PAP completed.     2. Gum inflammation/Pain in both hands/Pain in toes of both feet/Frostbite, subsequent encounter: Hx of bilateral toe frost bite, continued redness, swelling, itching, and pain of toes since then. Toes will break out in a blistery rash if exposed to cold and hurt. Toes and hands are cold to touch, normal color for ethnicity. Hands get very cold when exposed to colder temperatures and hurt. This started last year after frost bite. No history of Raynaud's. Hands and feet do not turn white or purple. Also a history of gum inflammation. Will check autoimmune labs today. Possible Rheumatology referral or Vascular referral pending on labs.   - Sedimentation Rate  - C-Reactive Protein(CRP)  - Antinuclear Antibodies Screen (MILAD)  -Continue Clobetasol as discussed for rash on toes.     3. Gastroesophageal reflux disease without esophagitis: Continued reflux, current prescription is , will refill today.   - omeprazole (PRILOSEC) 20 MG capsule; Take 1 capsule (20 mg total) by mouth daily before breakfast.  Dispense: 90 capsule; Refill: 3  -Try to avoid trigger foods as discussed today.     4. Special screening examination for human papillomavirus (HPV): PAP completed today. Normal pelvic exam.   - Gynecologic Cytology (PAP Smear)    5. Screening for endocrine, nutritional, metabolic and immunity disorder: Fasted labs drawn.   - HM1(CBC and Differential)  - Comprehensive Metabolic Panel  - Lipid Cascade  FASTING      Next follow up:  Return in about 5 days (around 2021) for Follow up.    Immunization Review  Adult Imm Review: Declines immunizations today    I discussed the following with the patient:   Adult Healthy Living: Importance of regular exercise  Healthy nutrition      Subjective:   Chief Complaint: Lindsay Payne is an 40 y.o. female here for a preventative health visit.    Patient has been advised of split billing requirements and indicates understanding: Yes     HPI:  The patient reports concerns of a possible autoimmune process. She reports that she saw her dentist, and she has some severe inflammation in her gums. She saw the Endodontist on ; she was started on Amoxicillin and Metronidazole for a possible gingivitis infection. She reports taking two dose of the Amoxicillin and Metronidazole and her stomach hurt so bad she stopped this. She was given Diflucan for a possible yeast infection (always gets this when she has antibiotics, she did not take this).    She reports last winter getting frost bite on her toes. She reports that since then her toes will blister and react to the cold. She has been using Clobetasol topically to the toes as needed, not regularly. She reports that her toes were itching so bad last night it kept her up. They itch and then they hurt. She reports that her toes are always cold, frigidly cold. She uses hand warmer packets in her boots, when she goes outside, but this doesn't even help.    She also reports that her hands and fingers hurt when she is out in the cold. She reports her hands are always frigidly cold. She denies that her toes and hands turn white in color when she is out in the elements. She always covers her hands and toes when outside in the elements. She has no issues when it is warmer outside.    She also reports continued GERD. She is an avid coffee drinker, her current prescription of Omeprazole is . Will refill this today.    She also  "reports that she has a Mirena IUD, she does not really get periods. She is due for a PAP today. She has no vaginal concerns.    She is fasted. She would like her cholesterol drawn today. Her father has a history of a Quad bypass.    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 She is exercising regularly.     She is  with children.     She denies a fever, chills, cough, shortness of breath, chest pain, chest pressure, nausea, vomiting, abdominal pain, urinary, or bowel symptoms today.     Healthy Habits  Are you taking a daily aspirin? No  Do you typically exercising at least 40 min, 3-4 times per week?  Yes  Do you usually eat at least 4 servings of fruit and vegetables a day, include whole grains and fiber and avoid regularly eating high fat foods? Yes  Have you had an eye exam in the past two years? Yes  Do you see a dentist twice per year? Yes  Do you have any concerns regarding sleep? No    Safety Screen  If you own firearms, are they secured in a locked gun cabinet or with trigger locks? The patient declines to answer  Do you feel you are safe where you are living?: Yes (8/26/2020 10:24 AM)  Do you feel you are safe in your relationship(s)?: Yes (8/26/2020 10:24 AM)      Review of Systems:  Please see above.  The rest of the review of systems are negative for all systems.     Pap History:   Yes - updated in Problem List and Health Maintenance accordingly  Cancer Screening       Status Date      PAP SMEAR Overdue 10/30/2003           Patient Care Team:  Vonnie Dixon CNP as PCP - General (Nurse Practitioner)  Vonnie Dixon CNP as Assigned PCP        History     Reviewed By Date/Time Sections Reviewed    Gabby Madden 2/12/2021  9:18 AM Tobacco            Objective:   Vital Signs:   Visit Vitals  /72   Pulse 69   Ht 5' 7\" (1.702 m)   Wt 152 lb 11.2 oz (69.3 kg)   BMI 23.92 kg/m           Physical Exam   Constitutional: She is oriented to person, place, and time and " well-developed, well-nourished, and in no distress. No distress.   HENT:   Head: Normocephalic and atraumatic.   Right Ear: External ear normal.   Left Ear: External ear normal.   Nose: Nose normal.   Mouth/Throat: Oropharynx is clear and moist. No oropharyngeal exudate.   Eyes: Pupils are equal, round, and reactive to light. Conjunctivae and EOM are normal. No scleral icterus.   Neck: Normal range of motion. Neck supple. No thyromegaly present.   Cardiovascular: Normal rate, regular rhythm and normal heart sounds. Exam reveals no gallop and no friction rub.   No murmur heard.  Pulmonary/Chest: Effort normal and breath sounds normal. Right breast exhibits no inverted nipple, no mass, no nipple discharge, no skin change and no tenderness. Left breast exhibits no inverted nipple, no mass, no nipple discharge, no skin change and no tenderness. Breasts are symmetrical.   Abdominal: Soft. Bowel sounds are normal. She exhibits no distension and no mass. There is no abdominal tenderness. There is no rebound and no guarding.   Genitourinary:    Vulva, vagina, cervix, uterus, right adnexa, left adnexa and rectum normal.      No vaginal discharge.     Musculoskeletal: Normal range of motion.         General: Tenderness present. No edema.      Left foot: Normal range of motion and normal capillary refill. Tenderness present. No bony tenderness, swelling, crepitus, deformity or laceration.        Feet:       Comments: Pulses intact on feet. Toes are normal color other than rash on left cuticles. Feet and toes are very cold to touch.    Lymphadenopathy:     She has no cervical adenopathy.   Neurological: She is alert and oriented to person, place, and time. Gait normal.   Skin: Skin is warm and dry. She is not diaphoretic. There is erythema.   Psychiatric: Memory, affect and judgment normal.   Nursing note and vitals reviewed.           Medication List          Accurate as of February 12, 2021 10:08 AM. If you have any  questions, ask your nurse or doctor.            CONTINUE taking these medications    omeprazole 20 MG capsule  Also known as: PriLOSEC  INSTRUCTIONS: Take 1 capsule (20 mg total) by mouth daily before breakfast.           STOP taking these medications    amoxicillin 500 MG capsule  Also known as: AMOXIL  Stopped by: Vonnie Dixon CNP     fluconazole 150 MG tablet  Also known as: DIFLUCAN  Stopped by: Vonnie Dixon CNP     HYDROcodone-acetaminophen 5-325 mg per tablet  Stopped by: Vonnie Dixon CNP     metroNIDAZOLE 500 MG tablet  Also known as: FLAGYL  Stopped by: Vonnie Dixon CNP           Where to Get Your Medications      These medications were sent to [id number] - [place], [place] - [address], [place] [address]    Phone: [phone]     omeprazole 20 MG capsule         Additional Screenings Completed Today: None

## 2021-08-05 ENCOUNTER — OFFICE VISIT (OUTPATIENT)
Dept: VASCULAR SURGERY | Facility: CLINIC | Age: 41
End: 2021-08-05
Attending: INTERNAL MEDICINE
Payer: COMMERCIAL

## 2021-08-05 VITALS — SYSTOLIC BLOOD PRESSURE: 88 MMHG | TEMPERATURE: 98.7 F | DIASTOLIC BLOOD PRESSURE: 64 MMHG | HEART RATE: 72 BPM

## 2021-08-05 DIAGNOSIS — I73.00 RAYNAUD'S DISEASE WITHOUT GANGRENE: Primary | ICD-10-CM

## 2021-08-05 PROCEDURE — 99214 OFFICE O/P EST MOD 30 MIN: CPT | Performed by: INTERNAL MEDICINE

## 2021-08-05 RX ORDER — AMLODIPINE BESYLATE 5 MG/1
5 TABLET ORAL
COMMUNITY
Start: 2021-05-28 | End: 2021-09-24

## 2021-08-05 ASSESSMENT — PAIN SCALES - GENERAL: PAINLEVEL: NO PAIN (0)

## 2021-08-05 NOTE — PATIENT INSTRUCTIONS
Patient Education     Norvasc Oral Tablet 5 mg  Uses  This medicine is used for the following purposes:    angina    high blood pressure    Raynaud's disease  Instructions  This medicine may be taken with or without food.  It is very important that you take the medicine at about the same time every day. It will work best if you do this.  Keep the medicine at room temperature. Avoid heat and direct light.  It is important that you keep taking each dose of this medicine on time even if you are feeling well.  If you forget to take a dose on time, take it as soon as you remember. If it is almost time for the next dose, do not take the missed dose. Return to your normal dosing schedule. Do not take 2 doses of this medicine at one time.  Please tell your doctor and pharmacist about all the medicines you take. Include both prescription and over-the-counter medicines. Also tell them about any vitamins, herbal medicines, or anything else you take for your health.  It is very important that you keep all appointments for medical exams and tests while on this medicine.  Cautions  Do not use the medication any more than instructed.  If possible, avoid using with marijuana or other medicines that can cause dizziness or drowsiness. These include allergy/cold products, muscle relaxers, sleep aids, and pain relievers.  Your ability to stay alert or to react quickly may be impaired by this medicine. Do not drive or operate machinery until you know how this medicine will affect you.  Please check with your doctor before drinking alcohol while on this medicine.  Tell the doctor or pharmacist if you are pregnant, planning to be pregnant, or breastfeeding.  Ask your pharmacist if this medicine can interact with any of your other medicines. Be sure to tell them about all the medicines you take.  Do not start or stop any other medicines without first speaking to your doctor or pharmacist.  Do not share this medicine with anyone who has not  been prescribed this medicine.  Side Effects  The following is a list of some common side effects from this medicine. Please speak with your doctor about what you should do if you experience these or other side effects.    dizziness    swelling of the legs, feet, and hands    lack of energy and tiredness    feeling of heat or flushing    low blood pressure  Call your doctor or get medical help right away if you notice any of these more serious side effects:    worsening chest pain or crushing feeling    fainting    fast or irregular heart beats    jaw pain    shortness of breath  A few people may have an allergic reactions to this medicine. Symptoms can include difficulty breathing, skin rash, itching, swelling, or severe dizziness. If you notice any of these symptoms, seek medical help quickly.  Extra  Please speak with your doctor, nurse, or pharmacist if you have any questions about this medicine.  [email or web address]  IMPORTANT NOTE: This document tells you briefly how to take your medicine, but it does not tell you all there is to know about it.Your doctor or pharmacist may give you other documents about your medicine. Please talk to them if you have any questions.Always follow their advice. There is a more complete description of this medicine available in English.Scan this code on your smartphone or tablet or use the web address below. You can also ask your pharmacist for a printout. If you have any questions, please ask your pharmacist.     2021 [email or web address].

## 2021-08-05 NOTE — PROGRESS NOTES
Vascular Medicine Progress Note     Lindsay Payne is a 40 year old female who seen here today for follow-up Raynaud's    Interval History   Patient is on Norvasc she takes Norvasc on and off she is feeling much better her symptoms are better by almost 70%    Physical Exam   Temp: 98.7  F (37.1  C)   BP: (!) 88/64 Pulse: 72            There were no vitals filed for this visit.  Vital Signs with Ranges  Temp:  [98.7  F (37.1  C)] 98.7  F (37.1  C)  Pulse:  [72] 72  BP: (88)/(64) 88/64  [unfilled]    Constitutional: awake, alert, cooperative, no apparent distress, and appears stated age  Eyes: Lids and lashes normal, pupils equal, round and reactive to light, extra ocular muscles intact, sclera clear, conjunctiva normal  ENT: normocepalic, without obvious abnormality, oropharynx pink and moist  Hematologic / Lymphatic: no lymphadenopathy  Respiratory: No increased work of breathing, good air exchange, clear to auscultation bilaterally, no crackles or wheezing  Cardiovascular: regular rate and rhythm, normal S1 and S2 and no murmur noted  GI: Normal bowel sounds, soft, non-distended, non-tender  Skin: no redness, warmth, or swelling, no rashes and no lesions  Musculoskeletal: There is no redness, warmth, or swelling of the joints.  Full range of motion noted.  Motor strength is 5 out of 5 all extremities bilaterally.  Tone is normal.  Neurologic: Awake, alert, oriented to name, place and time.  Cranial nerves II-XII are grossly intact.  Motor is 5 out of 5 bilaterally.    Neuropsychiatric:  Normal affect, memory, insight.  Pulses: Intact bilateral and equal with delayed capillary refill time but gets much better in comparison to the initial exam. No carotid bruits appreciated.     Medications         Data   No results found for this or any previous visit (from the past 24 hour(s)).    Assessment & Plan   No diagnosis found.      Summary: Continue with Norvasc current dose follow-up in mid winter    [facility] Center[facility]ana  MD Dheeraj

## 2021-09-24 ENCOUNTER — MYC MEDICAL ADVICE (OUTPATIENT)
Dept: VASCULAR SURGERY | Facility: CLINIC | Age: 41
End: 2021-09-24

## 2021-09-24 DIAGNOSIS — I73.00 RAYNAUD'S DISEASE WITHOUT GANGRENE: Primary | ICD-10-CM

## 2021-09-24 RX ORDER — AMLODIPINE BESYLATE 5 MG/1
5 TABLET ORAL DAILY
Qty: 30 TABLET | Refills: 3 | Status: SHIPPED | OUTPATIENT
Start: 2021-09-24 | End: 2022-05-24

## 2021-09-25 ENCOUNTER — HEALTH MAINTENANCE LETTER (OUTPATIENT)
Age: 41
End: 2021-09-25

## 2021-10-29 NOTE — TELEPHONE ENCOUNTER
"1. Recommend patient go to on care, just in case get COVID testing. THere is something now known as \"covid toes\" and we should  make sure she does not have that (I do recall she said this had been going on for months but I wonder if this is different).     2. IF that is negative then lets have her see Dr. Hand and consider a blood work up for something called pernio    3. Start clboetasol cream twice daily for 2 weeks. Have RN write for 45 grams  " rpt c/s @39wks repeat csection at 39weeks came in early labor repeat  at 39weeks came in early labor

## 2021-11-11 ENCOUNTER — MYC MEDICAL ADVICE (OUTPATIENT)
Dept: INTERNAL MEDICINE | Facility: CLINIC | Age: 41
End: 2021-11-11
Payer: COMMERCIAL

## 2021-11-11 DIAGNOSIS — J01.40 ACUTE NON-RECURRENT PANSINUSITIS: Primary | ICD-10-CM

## 2021-11-12 RX ORDER — DOXYCYCLINE 100 MG/1
100 CAPSULE ORAL 2 TIMES DAILY
Qty: 20 CAPSULE | Refills: 0 | Status: SHIPPED | OUTPATIENT
Start: 2021-11-12 | End: 2022-12-09

## 2021-11-15 ENCOUNTER — IMMUNIZATION (OUTPATIENT)
Dept: NURSING | Facility: CLINIC | Age: 41
End: 2021-11-15
Payer: COMMERCIAL

## 2021-11-15 PROCEDURE — 91306 COVID-19,PF,MODERNA (18+ YRS BOOSTER .25ML): CPT

## 2021-11-15 PROCEDURE — 0064A COVID-19,PF,MODERNA (18+ YRS BOOSTER .25ML): CPT

## 2022-02-17 PROBLEM — K21.9 GASTROESOPHAGEAL REFLUX DISEASE: Status: ACTIVE | Noted: 2017-12-19

## 2022-03-03 ENCOUNTER — OFFICE VISIT (OUTPATIENT)
Dept: VASCULAR SURGERY | Facility: CLINIC | Age: 42
End: 2022-03-03
Attending: INTERNAL MEDICINE
Payer: COMMERCIAL

## 2022-03-03 VITALS
DIASTOLIC BLOOD PRESSURE: 72 MMHG | RESPIRATION RATE: 12 BRPM | SYSTOLIC BLOOD PRESSURE: 110 MMHG | HEART RATE: 76 BPM | TEMPERATURE: 97.7 F

## 2022-03-03 DIAGNOSIS — I73.00 RAYNAUD'S DISEASE WITHOUT GANGRENE: Primary | ICD-10-CM

## 2022-03-03 PROCEDURE — 99214 OFFICE O/P EST MOD 30 MIN: CPT | Performed by: INTERNAL MEDICINE

## 2022-03-03 PROCEDURE — G0463 HOSPITAL OUTPT CLINIC VISIT: HCPCS

## 2022-03-03 RX ORDER — AMLODIPINE BESYLATE 10 MG/1
10 TABLET ORAL DAILY
Qty: 30 TABLET | Refills: 0 | Status: SHIPPED | OUTPATIENT
Start: 2022-03-03 | End: 2022-12-09

## 2022-03-03 ASSESSMENT — PAIN SCALES - GENERAL: PAINLEVEL: NO PAIN (0)

## 2022-03-03 NOTE — PROGRESS NOTES
[facility]  Uzair Esqueda MD - Vascular Medicine Progress Note    LOCATION: [facility] [place]    Lindsay Payne  Medical Record #:  [id number]  YOB: 1980  Age:  41 year old     Date of Service: 3/3/2022     PRIMARY CARE PROVIDER: Vonnie Dixon    REASON FOR VISIT:  evaluation for medical treatment for vasospastic disease/Raynaud's    IMPRESSION:  6 month f/u Raynaud's. On Norvasc 5 mg once daily- symtoms were improved last OV. Patient stated hands are not as painful but over the winter had frostbite on bilateral toes a couple times.   Patient c/o numbness in bilateral 5th toes when exposed to cold.   Caro mentioned she did purchase some electric gloves and socks.          RECOMMENDATION:  1-increased her Norvasc to 10 mg p.o. daily, patient is to report improvement of symptoms if any in 7 to 10 days from now  If Norvasc does not work and patient will be on Norvasc to 20 mg daily as a start of dose  2-continue with protective measures    If patients imaging is normal patient should follow up as needed    HPI: Lindsay Payne is a 41 year old female who was seen today for evaluation of vasospastic disease/Raynaud's treatment      PAST MEDICAL HISTORY  Past Medical History:   Diagnosis Date     NO ACTIVE PROBLEMS      Tear of right meniscus as current injury 2020       PAST SURGICAL HISTORY:  Past Surgical History:   Procedure Laterality Date     APPENDECTOMY  1994     APPENDECTOMY  2009         CURRENT MEDICATIONS  amLODIPine (NORVASC) 5 MG tablet, Take 1 tablet (5 mg) by mouth daily  levonorgestrel (MIRENA) 20 MCG/24HR IUD, 1 each by Intrauterine route once  omeprazole (PRILOSEC) 20 MG CR capsule, Take 1 capsule (20 mg) by mouth daily  doxycycline hyclate (VIBRAMYCIN) 100 MG capsule, Take 1 capsule (100 mg) by mouth 2 times daily (Patient not taking: Reported on 3/3/2022)    No current facility-administered medications on file prior to  visit.      ALLERGIES   No Known Allergies    FAMILY HISTORY  Family History   Problem Relation Age of Onset     Cancer Mother         malignant melanoma     Arthritis Father         Bekhterev's disease-spinal suffication     Glaucoma Maternal Grandmother      Melanoma Mother        SOCIAL HISTORY  Social History     Socioeconomic History     Marital status:      Spouse name: Not on file     Number of children: Not on file     Years of education: Not on file     Highest education level: Not on file   Occupational History     Not on file   Tobacco Use     Smoking status: Never Smoker     Smokeless tobacco: Never Used   Substance and Sexual Activity     Alcohol use: Not Currently     Comment: 1-2 drinks per month     Drug use: Never     Sexual activity: Yes     Partners: Male     Birth control/protection: I.U.D.   Other Topics Concern     Parent/sibling w/ CABG, MI or angioplasty before 65F 55M? Not Asked   Social History Narrative    Caffeine intake/servings daily - 0    Calcium intake/servings daily - 3    Exercise 3 times weekly - describe jogs, weights    Sunscreen used - Yes    Seatbelts used - Yes    Guns stored in the home - No    Self Breast Exam - Yes    Pap test up to date -  Yes    Eye exam up to date -  Yes    Dental exam up to date -  Yes    DEXA scan up to date -  No    Flex Sig/Colonoscopy up to date -  No    Mammography up to date -  No    Immunizations reviewed and up to date - Yes    Abuse: Current or Past (Physical, Sexual or Emotional) - No    Do you feel safe in your environment - Yes    Do you cope well with stress - Yes    Do you suffer from insomnia - No    Last updated by: Monica Flynn  2/18/2015             Social Determinants of Health     Financial Resource Strain: Not on file   Food Insecurity: Not on file   Transportation Needs: Not on file   Physical Activity: Not on file   Stress: Not on file   Social Connections: Not on file   Intimate Partner Violence: Not on file    Housing Stability: Not on file       ROS:   Complete ROS negative other than what is stated in HPI.     EXAM:  /72   Pulse 76   Temp 97.7  F (36.5  C) (Oral)   Resp 12   In general, the patient is a pleasant female in no apparent distress.    HEENT: NC/AT.  PERRLA.  EOMI.  Sclerae white, not injected.    Neck: No adenopathy.  Carotids +2/2 bilaterally without bruits.   Heart: RRR. Normal S1, S2 splits physiologically. No murmur, rub, click, or gallop.   Lungs: CTA.  No ronchi, wheezes, rales.    Abdomen: Soft, nontender, nondistended.   Extremities: No clubbing, cyanosis, or edema.  No wounds.     Labs:  LIPID RESULTS:  Lab Results   Component Value Date    CHOL 181 02/12/2021    CHOL 132 12/19/2014    HDL 42 (L) 02/12/2021    HDL 35 (L) 12/19/2014     02/12/2021    LDL 74 12/19/2014    TRIG 56 02/12/2021    TRIG 115 12/19/2014    CHOLHDLRATIO 3.8 12/19/2014       LIVER ENZYME RESULTS:  Lab Results   Component Value Date    AST 18 04/22/2021    AST 19 07/26/2018    ALT 13 04/22/2021    ALT 24 07/26/2018       CBC RESULTS:  Lab Results   Component Value Date    WBC 6.2 04/22/2021    WBC 5.9 05/12/2017    RBC 4.88 04/22/2021    RBC 4.55 05/12/2017    HGB 15.1 04/22/2021    HGB 12.5 05/12/2017    HCT 44.2 04/22/2021    HCT 39.0 05/12/2017    MCV 91 04/22/2021    MCV 86 05/12/2017    MCH 30.9 04/22/2021    MCH 27.5 05/12/2017    MCHC 34.2 04/22/2021    MCHC 32.1 05/12/2017    RDW 11.8 04/22/2021    RDW 14.0 05/12/2017     04/22/2021     05/12/2017       BMP RESULTS:  Lab Results   Component Value Date     04/22/2021     07/26/2018    POTASSIUM 4.6 04/22/2021    POTASSIUM 4.4 07/26/2018    CHLORIDE 104 04/22/2021    CHLORIDE 107 07/26/2018    CO2 28 04/22/2021    CO2 27 07/26/2018    ANIONGAP 9 04/22/2021    ANIONGAP 4 07/26/2018    GLC 87 04/22/2021    GLC 77 07/26/2018    BUN 17 04/22/2021    BUN 17 07/26/2018    CR 0.75 04/22/2021    CR 0.72 07/26/2018    GFRESTIMATED >60  04/22/2021    GFRESTIMATED >90 07/26/2018    GFRESTBLACK >60 04/22/2021    GFRESTBLACK >90 07/26/2018    LEXI 9.8 04/22/2021    LEXI 8.6 07/26/2018        A1C RESULTS:  No results found for: A1C    THYROID RESULTS:  Lab Results   Component Value Date    TSH 1.20 04/22/2021    TSH 1.05 07/26/2018         DIAGNOSTIC STUDIES:     Images:  No results found.    .      Uzair Esqueda MD        20 minutes spent on the date of the encounter doing chart review, history and exam, documentation, and further activities as noted above.

## 2022-03-03 NOTE — PROGRESS NOTES
6 month f/u Raynaud's. On Norvasc 5 mg once daily- symtoms were improved last OV. Patient stated hands are not as painful but over the winter had frostbite on bilateral toes a couple times. Patient c/o numbness in bilateral 5th toes when exposed to cold. Caro mentioned she did purchase some electric gloves and socks.

## 2022-03-03 NOTE — PATIENT INSTRUCTIONS
Begin taking amlodipine (Norvasc) 10 mg daily. A new prescription was sent for this. Please call if this is helping and we can do a refill for a 90 day supply for the year [phone]. If this does not work Dr. Esqueda would like you to try sildenafil (Revatio) 20 once daily. This is sometimes not covered by insurance but can be bought at [facility] for $56 for 90 day supply.     Patient Education     Norvasc Oral Tablet 10 mg  Uses  This medicine is used for the following purposes:    angina    high blood pressure    Raynaud's disease  Instructions  This medicine may be taken with or without food.  It is very important that you take the medicine at about the same time every day. It will work best if you do this.  Keep the medicine at room temperature. Avoid heat and direct light.  It is important that you keep taking each dose of this medicine on time even if you are feeling well.  If you forget to take a dose on time, take it as soon as you remember. If it is almost time for the next dose, do not take the missed dose. Return to your normal dosing schedule. Do not take 2 doses of this medicine at one time.  Please tell your doctor and pharmacist about all the medicines you take. Include both prescription and over-the-counter medicines. Also tell them about any vitamins, herbal medicines, or anything else you take for your health.  It is very important that you keep all appointments for medical exams and tests while on this medicine.  Cautions  Do not use the medication any more than instructed.  If possible, avoid using with marijuana or other medicines that can cause dizziness or drowsiness. These include allergy/cold products, muscle relaxers, sleep aids, and pain relievers.  Your ability to stay alert or to react quickly may be impaired by this medicine. Do not drive or operate machinery until you know how this medicine will affect you.  Please check with your doctor before drinking alcohol while  on this medicine.  Tell the doctor or pharmacist if you are pregnant, planning to be pregnant, or breastfeeding.  Ask your pharmacist if this medicine can interact with any of your other medicines. Be sure to tell them about all the medicines you take.  Do not start or stop any other medicines without first speaking to your doctor or pharmacist.  Do not share this medicine with anyone who has not been prescribed this medicine.  Side Effects  The following is a list of some common side effects from this medicine. Please speak with your doctor about what you should do if you experience these or other side effects.    dizziness    swelling of the legs, feet, and hands    lack of energy and tiredness    feeling of heat or flushing    low blood pressure  Call your doctor or get medical help right away if you notice any of these more serious side effects:    worsening chest pain or crushing feeling    fainting    fast or irregular heart beats    jaw pain    shortness of breath  A few people may have an allergic reactions to this medicine. Symptoms can include difficulty breathing, skin rash, itching, swelling, or severe dizziness. If you notice any of these symptoms, seek medical help quickly.  Extra  Please speak with your doctor, nurse, or pharmacist if you have any questions about this medicine.  [email or web address]  IMPORTANT NOTE: This document tells you briefly how to take your medicine, but it does not tell you all there is to know about it.Your doctor or pharmacist may give you other documents about your medicine. Please talk to them if you have any questions.Always follow their advice. There is a more complete description of this medicine available in English.Scan this code on your smartphone or tablet or use the web address below. You can also ask your pharmacist for a printout. If you have any questions, please ask your pharmacist.     2021 [employer].    Patient Education      Revatio Oral Tablet 20 mg  Uses  For high blood pressure in the lungs.  Instructions  This medicine may be taken with or without food.  Store at room temperature in a dry place. Do not keep in the bathroom.  Keep the medicine away from heat and light.  It is important that you keep taking each dose of this medicine on time even if you are feeling well.  If you forget to take a dose on time, take it as soon as you remember. If it is almost time for the next dose, do not take the missed dose. Return to your normal dosing schedule. Do not take 2 doses of this medicine at one time.  Please tell your doctor and pharmacist about all the medicines you take. Include both prescription and over-the-counter medicines. Also tell them about any vitamins, herbal medicines, or anything else you take for your health.  Do not suddenly stop taking this medicine. Check with your doctor before stopping.  It is very important that you keep all appointments for medical exams and tests while on this medicine.  Cautions  Tell your doctor and pharmacist if you ever had an allergic reaction to a medicine. Symptoms of an allergic reaction can include trouble breathing, skin rash, itching, swelling, or severe dizziness.  Do not use the medication any more than instructed.  This medicine may cause dizziness or fainting, especially after exercising or in hot weather. Be very careful when standing or sitting up quickly.  Your ability to stay alert or to react quickly may be impaired by this medicine. Do not drive or operate machinery until you know how this medicine will affect you.  Please check with your doctor before drinking alcohol while on this medicine.  Do not take any medicine containing nitrates while on this medicine.  Tell the doctor or pharmacist if you are pregnant, planning to be pregnant, or breastfeeding.  Ask your pharmacist if this medicine can interact with any of your other medicines. Be sure to tell them about all the  medicines you take.  Please tell all your doctors and dentists that you are on this medicine before they provide care.  Do not start or stop any other medicines without first speaking to your doctor or pharmacist.  If you have had a heart attack or a stroke within the past 6 months, talk to your doctor before using this medicine.  If you have painful erection or an erection for more than 4 hours, seek medical care right away.  Call your doctor right away if you get chest pain, trouble seeing, hearing problems, ringing in the ears, memory loss, or severe dizziness.  Do not share this medicine with anyone who has not been prescribed this medicine.  Side Effects  The following is a list of some common side effects from this medicine. Please speak with your doctor about what you should do if you experience these or other side effects.    agitated feeling or trouble sleeping    dizziness    swelling of the legs, feet, and hands    nosebleeds    feeling of heat or flushing    headaches    low blood pressure    lightheadedness    stuffy nose    nausea    stomach upset or abdominal pain  Call your doctor or get medical help right away if you notice any of these more serious side effects:    chest pain    fainting    hearing loss    shortness of breath    blurring or changes of vision  A few people may have an allergic reactions to this medicine. Symptoms can include difficulty breathing, skin rash, itching, swelling, or severe dizziness. If you notice any of these symptoms, seek medical help quickly.  Extra  Please speak with your doctor, nurse, or pharmacist if you have any questions about this medicine.  [email or web address]  IMPORTANT NOTE: This document tells you briefly how to take your medicine, but it does not tell you all there is to know about it.Your doctor or pharmacist may give you other documents about your medicine. Please talk to them if you have any questions.Always follow their  advice. There is a more complete description of this medicine available in English.Scan this code on your smartphone or tablet or use the web address below. You can also ask your pharmacist for a printout. If you have any questions, please ask your pharmacist.     2021 [employer].

## 2022-03-12 ENCOUNTER — HEALTH MAINTENANCE LETTER (OUTPATIENT)
Age: 42
End: 2022-03-12

## 2022-03-22 ENCOUNTER — TELEPHONE (OUTPATIENT)
Dept: DERMATOLOGY | Facility: CLINIC | Age: 42
End: 2022-03-22

## 2022-03-22 ENCOUNTER — OFFICE VISIT (OUTPATIENT)
Dept: DERMATOLOGY | Facility: CLINIC | Age: 42
End: 2022-03-22
Payer: COMMERCIAL

## 2022-03-22 DIAGNOSIS — D18.01 CHERRY ANGIOMA: ICD-10-CM

## 2022-03-22 DIAGNOSIS — L71.0 PERIORAL DERMATITIS: ICD-10-CM

## 2022-03-22 DIAGNOSIS — Z12.83 SKIN EXAM FOR MALIGNANT NEOPLASM: Primary | ICD-10-CM

## 2022-03-22 DIAGNOSIS — L71.9 ROSACEA: ICD-10-CM

## 2022-03-22 DIAGNOSIS — L82.1 SEBORRHEIC KERATOSES: ICD-10-CM

## 2022-03-22 DIAGNOSIS — L71.9 ACNE ROSACEA: ICD-10-CM

## 2022-03-22 DIAGNOSIS — L30.9 PERIORBITAL DERMATITIS: ICD-10-CM

## 2022-03-22 DIAGNOSIS — D22.9 MULTIPLE NEVI: ICD-10-CM

## 2022-03-22 DIAGNOSIS — L71.0 PERIORAL DERMATITIS: Primary | ICD-10-CM

## 2022-03-22 PROCEDURE — 99214 OFFICE O/P EST MOD 30 MIN: CPT | Mod: GC | Performed by: DERMATOLOGY

## 2022-03-22 RX ORDER — PIMECROLIMUS 10 MG/G
CREAM TOPICAL 2 TIMES DAILY
Qty: 30 G | Refills: 3 | Status: SHIPPED | OUTPATIENT
Start: 2022-03-22 | End: 2022-12-09

## 2022-03-22 RX ORDER — TRETINOIN 0.25 MG/G
CREAM TOPICAL
Qty: 20 G | Refills: 11 | Status: SHIPPED | OUTPATIENT
Start: 2022-03-22 | End: 2023-05-10

## 2022-03-22 ASSESSMENT — PAIN SCALES - GENERAL: PAINLEVEL: NO PAIN (0)

## 2022-03-22 NOTE — PATIENT INSTRUCTIONS
1. Rosacea  - We can use the metronidazole cream rather than the gel   - Can use it twice a day on the face  - For the itchy areas, please use the elidel cream twice a day as needed for itchiness   - We may have to wait for insurance to cover it   - If not, we can send tacrolimus ointment for the same effect    2. Perioral dermatitis  - You can use the metronidazole cream in the interim  - If you'd like to, please let us know if you want to do the doxycycline   - You would take one capsule twice a day for 2 months   - If you skin begins to clear up, you can decrease to once a day   - Make sure to take it with food to avoid GI upset and also wear plenty of sunscreen    3. Acne  - For the tretinoin, please use a pea sized amount on your entire face  - If no irritation, can increase to nightly    PLEASE LET US KNOW IF THINGS ARE EXPENSIVE

## 2022-03-22 NOTE — LETTER
3/22/2022       RE: Lindsay Payne  [address] [place] [address]     Dear Colleague,    Thank you for referring your patient, Lindsay Payne, to the [facility] DERMATOLOGY [facility]IC [place] at [facility]. Please see a copy of my visit note below.    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  # Rosacea/perioral dermatitis  - s/p PDL   - Current Tx: elidel +/- doxy  - Previous Tx: metrogel (gel too irritating)  # Spider angioma, nasal bridge  - s/p PDL 5/9/18, 11/21/18, 1/15/2020, 02/12/20  # Acne  - Current Tx: tretinoin 0.025% cream  # Benign bx:  - History of benign lesion biopsied on back per report  - History of nevus excision left breast in raine, non-dysplastic per patient  - Nevus pigmentosus, compound, left inferior breast, bx 4/11/14  - Lentigo, left breast, bx 8/7/2015  # Pressure induced urticaria bilateral feet  - Current Tx: Fexofenadine 180 mg BID  ___________________________________________    Assessment & Plan:    # Perioral dermatitis  # Rosacea with acne  # Periorbital dermatitis  Discussed the possible etiologies, clinical course, and management options of this benign condition with the patient. Will transition to metronidazole cream (to avoid gel base) and start on elidel for the dermatitis and tretinoin 0.025% cream for acne  - Prescribed metronidazole cream BID  - Prescribed elidel cream BID PRN dermatitis  - Prescribed tretinoin 0.025% cream  - Recommended to let us know if she would like a short course of doxy    # Multiple clinically banal appearing nevi  Discussed the possible etiologies, clinical course, and management options of this benign condition with the patient.  - Reviewed the  ABCDEs of melanoma  - Recommend daily sunscreen use with SPF at least 30    # Seborrheic keratoses  # Cherry angiomata  Discussed the possible etiologies, clinical course, and management options of this benign condition with the patient.  - Reassurance provided    Procedures Performed:  None    Follow-up: 6m    Staff Involved:  Patient was seen and staffed with attending physician Dr. Agustin Bahena MD  Med/Derm Resident PGY-4  P:[phone]    Staff Addendum:  I, Monica Velez MD, saw this patient with the resident and agree with the resident s findings and plan of care as documented in the resident s note.    ___________________________________________      CC: Skin Check (Caro is here today for full body skin check, fhx melanoma) and Derm Problem (f/u rosacea and perioral dermatitis, medication that was prescribed has not worked in the past )      HPI:  Ms. Lindsay Payne is a(n) 41 year old female who presents to clinic today for FBSE. Reports:  - she has a couple areas of concern  - an itchy red spot on her cheeks  - would like to also discuss perioral dermatitis and rosacea therapies  - has tried metrogel without much improvement  - notes some irritation from the gel  - otherwise feeling well in usual state of health    Physical exam:  General: in no acute distress, well-developed, well-nourished  Skin:  - skin type: medium fair  - erythematous papules on the medial face with increased blood vessels  - faint ill-defined erythema affecting temples and periorbital skin  - minute papules around the mouth  - multiple light brown to tan papules with reassuring pigment network on dermoscopy on face, trunk, and extremities  - tan to light brown waxy stuck on papules and plaques on arms  - red or purple papules that darlin with diascopy on arms and trunk  - No other lesions of concern on areas examined.     Medications:  Current Outpatient Medications   Medication     amLODIPine (NORVASC) 10 MG  tablet     amLODIPine (NORVASC) 5 MG tablet     levonorgestrel (MIRENA) 20 MCG/24HR IUD     omeprazole (PRILOSEC) 20 MG CR capsule     doxycycline hyclate (VIBRAMYCIN) 100 MG capsule     No current facility-administered medications for this visit.      Past Medical History:   Patient Active Problem List   Diagnosis     CARDIOVASCULAR SCREENING; LDL GOAL LESS THAN 160     Paraguard intrauterine device     Gastroesophageal reflux disease, esophagitis presence not specified     Past Medical History:   Diagnosis Date     NO ACTIVE PROBLEMS      Tear of right meniscus as current injury 2020

## 2022-03-22 NOTE — PROGRESS NOTES
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  # Rosacea/perioral dermatitis  - s/p PDL   - Current Tx: elidel +/- doxy  - Previous Tx: metrogel (gel too irritating)  # Spider angioma, nasal bridge  - s/p PDL 5/9/18, 11/21/18, 1/15/2020, 02/12/20  # Acne  - Current Tx: tretinoin 0.025% cream  # Benign bx:  - History of benign lesion biopsied on back per report  - History of nevus excision left breast in raine, non-dysplastic per patient  - Nevus pigmentosus, compound, left inferior breast, bx 4/11/14  - Lentigo, left breast, bx 8/7/2015  # Pressure induced urticaria bilateral feet  - Current Tx: Fexofenadine 180 mg BID  ___________________________________________    Assessment & Plan:    # Perioral dermatitis  # Rosacea with acne  # Periorbital dermatitis  Discussed the possible etiologies, clinical course, and management options of this benign condition with the patient. Will transition to metronidazole cream (to avoid gel base) and start on elidel for the dermatitis and tretinoin 0.025% cream for acne  - Prescribed metronidazole cream BID  - Prescribed elidel cream BID PRN dermatitis  - Prescribed tretinoin 0.025% cream  - Recommended to let us know if she would like a short course of doxy    # Multiple clinically banal appearing nevi  Discussed the possible etiologies, clinical course, and management options of this benign condition with the patient.  - Reviewed the ABCDEs of melanoma  - Recommend daily sunscreen use with SPF at least 30    # Seborrheic keratoses  # Cherry angiomata  Discussed the possible etiologies, clinical course, and management options of this benign condition with the patient.  - Reassurance provided    Procedures Performed:  None    Follow-up:  6m    Staff Involved:  Patient was seen and staffed with attending physician Dr. Agustin Bahena MD  Med/Derm Resident PGY-4  P:[phone]    Staff Addendum:  I, Monica Velez MD, saw this patient with the resident and agree with the resident s findings and plan of care as documented in the resident s note.    ___________________________________________      CC: Skin Check (Caro is here today for full body skin check, fhx melanoma) and Derm Problem (f/u rosacea and perioral dermatitis, medication that was prescribed has not worked in the past )      HPI:  Ms. Lindsay Payne is a(n) 41 year old female who presents to clinic today for FBSE. Reports:  - she has a couple areas of concern  - an itchy red spot on her cheeks  - would like to also discuss perioral dermatitis and rosacea therapies  - has tried metrogel without much improvement  - notes some irritation from the gel  - otherwise feeling well in usual state of health    Physical exam:  General: in no acute distress, well-developed, well-nourished  Skin:  - skin type: medium fair  - erythematous papules on the medial face with increased blood vessels  - faint ill-defined erythema affecting temples and periorbital skin  - minute papules around the mouth  - multiple light brown to tan papules with reassuring pigment network on dermoscopy on face, trunk, and extremities  - tan to light brown waxy stuck on papules and plaques on arms  - red or purple papules that darlin with diascopy on arms and trunk  - No other lesions of concern on areas examined.     Medications:  Current Outpatient Medications   Medication     amLODIPine (NORVASC) 10 MG tablet     amLODIPine (NORVASC) 5 MG tablet     levonorgestrel (MIRENA) 20 MCG/24HR IUD     omeprazole (PRILOSEC) 20 MG CR capsule     doxycycline hyclate (VIBRAMYCIN) 100 MG capsule     No current facility-administered medications for this visit.      Past Medical History:   Patient Active Problem List    Diagnosis     CARDIOVASCULAR SCREENING; LDL GOAL LESS THAN 160     Paraguard intrauterine device     Gastroesophageal reflux disease, esophagitis presence not specified     Past Medical History:   Diagnosis Date     NO ACTIVE PROBLEMS      Tear of right meniscus as current injury 2020

## 2022-03-22 NOTE — NURSING NOTE
Dermatology Rooming Note    Lindsay Payne's goals for this visit include:   Chief Complaint   Patient presents with     Skin Check     Caro is here today for full body skin check, fhx melanoma     Derm Problem     f/u rosacea and perioral dermatitis, medication that was prescribed has not worked in the past      Kinza Zaidi, EMT

## 2022-03-22 NOTE — TELEPHONE ENCOUNTER
Prior Authorization Retail Medication Request    Medication/Dose: pimecrolimus (ELIDEL) 1 % external cream  ICD code (if different than what is on RX):  Perioral dermatitis [L71.0]   Periorbital dermatitis [L30.9]   Previously Tried and Failed:    Rationale:      Insurance Name:  [employer]  Insurance ID:  [id number]

## 2022-03-23 NOTE — TELEPHONE ENCOUNTER
PA Initiation    Medication: pimecrolimus (ELIDEL) 1 % external cream   Insurance Company: [employer] - Phone [phone] Fax [phone]  Pharmacy Filling the Rx: [id number] - [place], [place] - [address] [place] [address]  Filling Pharmacy Phone: [phone]  Filling Pharmacy Fax: [phone]  Start Date: 3/22/2022

## 2022-03-23 NOTE — TELEPHONE ENCOUNTER
PRIOR AUTHORIZATION DENIED    Medication: pimecrolimus (ELIDEL) 1 % external cream--DENIED    Denial Date: 3/23/2022    Denial Rational: Excluded    Appeal Information:

## 2022-03-24 ENCOUNTER — TELEPHONE (OUTPATIENT)
Dept: DERMATOLOGY | Facility: CLINIC | Age: 42
End: 2022-03-24
Payer: COMMERCIAL

## 2022-03-24 RX ORDER — TACROLIMUS 1 MG/G
OINTMENT TOPICAL 2 TIMES DAILY
Qty: 30 G | Refills: 6 | Status: SHIPPED | OUTPATIENT
Start: 2022-03-24 | End: 2022-12-09

## 2022-03-24 NOTE — TELEPHONE ENCOUNTER
, Monica Martinez MD  [facility] Dermatology Adult [facility] 7 minutes ago (7:58 AM)     KB    Please let patient know I switched the prescription to a topical tacrolimus (Protopic) due to insurance denial.       Called and informed patient. No further questions at this time.    Keyla Paige CMA on 3/24/2022 at 8:07 AM

## 2022-03-24 NOTE — TELEPHONE ENCOUNTER
Prior Authorization Retail Medication Request    Medication/Dose: tacrolimus (PROTOPIC) 0.1 % external ointment  ICD code (if different than what is on RX):  Perioral dermatitis [L71.0]  Previously Tried and Failed:    Rationale:      Insurance Name:  [employer]  Insurance ID:  [id number]

## 2022-03-24 NOTE — TELEPHONE ENCOUNTER
PA Initiation    Medication: tacrolimus (PROTOPIC) 0.1 % external ointment  Insurance Company: [employer] - Phone [phone] Fax [phone]  Pharmacy Filling the Rx: [id number] - [place], [place] - [address] [place] [address]  Filling Pharmacy Phone: [phone]  Filling Pharmacy Fax: [phone]  Start Date: 3/24/2022

## 2022-03-25 NOTE — TELEPHONE ENCOUNTER
Prior Authorization Approval    Authorization Effective Date: 3/24/2022  Authorization Expiration Date: 3/24/2023  Medication: tacrolimus (PROTOPIC) 0.1 % external ointment--APPROVED  Approved Dose/Quantity:   Reference #:     Insurance Company: [employer] - Phone [phone] Fax [phone]  Expected CoPay:       CoPay Card Available:      Foundation Assistance Needed:    Which Pharmacy is filling the prescription (Not needed for infusion/clinic administered): [id number] - [place], [place] - [address] [place] [address]  Pharmacy Notified: Yes  Patient Notified: Yes **Instructed pharmacy to notify patient when script is ready to /ship.**

## 2022-03-27 ENCOUNTER — MYC MEDICAL ADVICE (OUTPATIENT)
Dept: VASCULAR SURGERY | Facility: CLINIC | Age: 42
End: 2022-03-27
Payer: COMMERCIAL

## 2022-03-27 DIAGNOSIS — I73.00 RAYNAUD'S DISEASE WITHOUT GANGRENE: Primary | ICD-10-CM

## 2022-03-31 ENCOUNTER — TELEPHONE (OUTPATIENT)
Dept: VASCULAR SURGERY | Facility: CLINIC | Age: 42
End: 2022-03-31
Payer: COMMERCIAL

## 2022-03-31 RX ORDER — SILDENAFIL CITRATE 20 MG/1
20 TABLET ORAL DAILY
Qty: 90 TABLET | Refills: 3 | Status: SHIPPED | OUTPATIENT
Start: 2022-03-31 | End: 2022-05-24

## 2022-03-31 NOTE — TELEPHONE ENCOUNTER
Spoke with patient and reviewed the [employer]pside message that was sent to stop amlodipine and start revatio.  Patient agrees with plan.

## 2022-03-31 NOTE — TELEPHONE ENCOUNTER
Caller: Caro    Provider: MD Uzair Esqueda    Detailed reason for call: She missed a call from our team. Her internet is down so she is not able to see her My Chart message. Writer relayed message regarding her medication change and pharmacy.    She was uncertain when she should schedule a follow up. Please advise.    Best phone number to contact: [phone]    Best time to contact: any    Ok to leave a detailed message: Yes    O

## 2022-05-24 DIAGNOSIS — I73.00 RAYNAUD'S DISEASE WITHOUT GANGRENE: ICD-10-CM

## 2022-05-24 RX ORDER — AMLODIPINE BESYLATE 10 MG/1
10 TABLET ORAL DAILY
Qty: 30 TABLET | Refills: 3 | Status: SHIPPED | OUTPATIENT
Start: 2022-05-24 | End: 2022-12-09

## 2022-06-07 ENCOUNTER — VIRTUAL VISIT (OUTPATIENT)
Dept: VASCULAR SURGERY | Facility: CLINIC | Age: 42
End: 2022-06-07
Payer: COMMERCIAL

## 2022-06-07 DIAGNOSIS — I73.00 RAYNAUD'S DISEASE WITHOUT GANGRENE: Primary | ICD-10-CM

## 2022-06-07 PROCEDURE — 99213 OFFICE O/P EST LOW 20 MIN: CPT | Mod: 95 | Performed by: INTERNAL MEDICINE

## 2022-06-07 NOTE — PATIENT INSTRUCTIONS
Elmira Payne ,    Thank you for entrusting your care with us today. After your visit today with Dr Uzair Esqueda this is the plan that was discussed at your appointment.    Stop your norvasc    2. Start aspirin 325mg     3. Call us next week for an update on your status          I am including additional information on these things and our contact information if you have any questions or concerns.   Please do not hesitate to reach out to us if you felt we did not answer your questions or you are unsure of the treatment plan after your visit today. Our number is [phone].Thank you for trusting us with your care.         Again thank you for your time.     Stefany Boone RN

## 2022-06-07 NOTE — PROGRESS NOTES
[facility] VASCULAR CLINIC  Uzair Esqueda MD - Vascular Medicine Progress Note    LOCATION: [facility] V[facility] - [place]    Lindsay Payne  Medical Record #:  [id number]  YOB: 1980  Age:  41 year old     Date of Service: 6/7/2022     PRIMARY CARE PROVIDER: Vonnie Toro    REASON FOR VISIT:   follow up  for vasospastic disease    IMPRESSION: Vasospastic disease Raynaud's did respond partially to Norvasc now when the weather is warmer she is experiencing signs and symptoms suggestive of erythromelalgia    RECOMMENDATION:    Follow up with in 1 week     And DC Norvasc for now  Start aspirin 325 mg p.o. daily    If patients imaging is normal patient should follow up in 1 week over the phone    HPI: Lindsay Payne is a 41 year old female who was interviewed over the phone today for follow-up on vasospastic disease      PAST MEDICAL HISTORY  Past Medical History:   Diagnosis Date     NO ACTIVE PROBLEMS      Tear of right meniscus as current injury 2020       PAST SURGICAL HISTORY:  Past Surgical History:   Procedure Laterality Date     APPENDECTOMY  1994     APPENDECTOMY  2009         CURRENT MEDICATIONS  amLODIPine (NORVASC) 10 MG tablet, Take 1 tablet (10 mg) by mouth daily  levonorgestrel (MIRENA) 20 MCG/24HR IUD, 1 each by Intrauterine route once  metroNIDAZOLE (METROCREAM) 0.75 % external cream, Apply topically 2 times daily  omeprazole (PRILOSEC) 20 MG CR capsule, Take 1 capsule (20 mg) by mouth daily  tacrolimus (PROTOPIC) 0.1 % external ointment, Apply topically 2 times daily  tretinoin (RETIN-A) 0.025 % external cream, Please apply a pea sized amount to entire face every other night for 2 weeks. If no irritation, can increase to nightly  amLODIPine (NORVASC) 10 MG tablet, Take 1 tablet (10 mg) by mouth daily (Patient not taking: Reported on 6/7/2022)  doxycycline hyclate (VIBRAMYCIN) 100 MG capsule, Take 1 capsule (100 mg) by mouth 2 times daily (Patient not taking:  Reported on 3/3/2022)  pimecrolimus (ELIDEL) 1 % external cream, Apply topically 2 times daily    No current facility-administered medications on file prior to visit.      ALLERGIES   No Known Allergies    FAMILY HISTORY  Family History   Problem Relation Age of Onset     Cancer Mother         malignant melanoma     Arthritis Father         Bekhterev's disease-spinal suffication     Glaucoma Maternal Grandmother      Melanoma Mother        SOCIAL HISTORY  Social History     Socioeconomic History     Marital status:      Spouse name: Not on file     Number of children: Not on file     Years of education: Not on file     Highest education level: Not on file   Occupational History     Not on file   Tobacco Use     Smoking status: Never Smoker     Smokeless tobacco: Never Used   Substance and Sexual Activity     Alcohol use: Not Currently     Comment: 1-2 drinks per month     Drug use: Never     Sexual activity: Yes     Partners: Male     Birth control/protection: I.U.D.   Other Topics Concern     Parent/sibling w/ CABG, MI or angioplasty before 65F 55M? Not Asked   Social History Narrative    Caffeine intake/servings daily - 0    Calcium intake/servings daily - 3    Exercise 3 times weekly - describe jogs, weights    Sunscreen used - Yes    Seatbelts used - Yes    Guns stored in the home - No    Self Breast Exam - Yes    Pap test up to date -  Yes    Eye exam up to date -  Yes    Dental exam up to date -  Yes    DEXA scan up to date -  No    Flex Sig/Colonoscopy up to date -  No    Mammography up to date -  No    Immunizations reviewed and up to date - Yes    Abuse: Current or Past (Physical, Sexual or Emotional) - No    Do you feel safe in your environment - Yes    Do you cope well with stress - Yes    Do you suffer from insomnia - No    Last updated by: Monica Flynn  2/18/2015             Social Determinants of Health     Financial Resource Strain: Not on file   Food Insecurity: Not on file    Transportation Needs: Not on file   Physical Activity: Not on file   Stress: Not on file   Social Connections: Not on file   Intimate Partner Violence: Not on file   Housing Stability: Not on file       ROS:   Complete ROS negative other than what is stated in HPI.         Labs:  LIPID RESULTS:  Lab Results   Component Value Date    CHOL 181 02/12/2021    CHOL 132 12/19/2014    HDL 42 (L) 02/12/2021    HDL 35 (L) 12/19/2014     02/12/2021    LDL 74 12/19/2014    TRIG 56 02/12/2021    TRIG 115 12/19/2014    CHOLHDLRATIO 3.8 12/19/2014       LIVER ENZYME RESULTS:  Lab Results   Component Value Date    AST 18 04/22/2021    AST 19 07/26/2018    ALT 13 04/22/2021    ALT 24 07/26/2018       CBC RESULTS:  Lab Results   Component Value Date    WBC 6.2 04/22/2021    WBC 5.9 05/12/2017    RBC 4.88 04/22/2021    RBC 4.55 05/12/2017    HGB 15.1 04/22/2021    HGB 12.5 05/12/2017    HCT 44.2 04/22/2021    HCT 39.0 05/12/2017    MCV 91 04/22/2021    MCV 86 05/12/2017    MCH 30.9 04/22/2021    MCH 27.5 05/12/2017    MCHC 34.2 04/22/2021    MCHC 32.1 05/12/2017    RDW 11.8 04/22/2021    RDW 14.0 05/12/2017     04/22/2021     05/12/2017       BMP RESULTS:  Lab Results   Component Value Date     04/22/2021     07/26/2018    POTASSIUM 4.6 04/22/2021    POTASSIUM 4.4 07/26/2018    CHLORIDE 104 04/22/2021    CHLORIDE 107 07/26/2018    CO2 28 04/22/2021    CO2 27 07/26/2018    ANIONGAP 9 04/22/2021    ANIONGAP 4 07/26/2018    GLC 87 04/22/2021    GLC 77 07/26/2018    BUN 17 04/22/2021    BUN 17 07/26/2018    CR 0.75 04/22/2021    CR 0.72 07/26/2018    GFRESTIMATED >60 04/22/2021    GFRESTIMATED >90 07/26/2018    GFRESTBLACK >60 04/22/2021    GFRESTBLACK >90 07/26/2018    LEXI 9.8 04/22/2021    LEXI 8.6 07/26/2018        A1C RESULTS:  No results found for: A1C    THYROID RESULTS:  Lab Results   Component Value Date    TSH 1.20 04/22/2021    TSH 1.05 07/26/2018         DIAGNOSTIC STUDIES:     Images:  No  results found.    .      Uzair Esqueda MD        12 minutes spent on the date of the encounter doing chart review, history and exam, documentation, and further activities as noted above.

## 2022-06-10 ENCOUNTER — TELEPHONE (OUTPATIENT)
Dept: DERMATOLOGY | Facility: CLINIC | Age: 42
End: 2022-06-10
Payer: COMMERCIAL

## 2022-06-10 NOTE — TELEPHONE ENCOUNTER
Attempt to call patient to help schedule follow up per Dr Velez last visit on 3/22/22 checkout dispositions for Rosacea and acne follow up. Left message with Dermatology number for patient to call back to schedule.

## 2022-08-31 ENCOUNTER — OFFICE VISIT (OUTPATIENT)
Dept: DERMATOLOGY | Facility: CLINIC | Age: 42
End: 2022-08-31
Payer: COMMERCIAL

## 2022-08-31 DIAGNOSIS — B07.8 COMMON WART: Primary | ICD-10-CM

## 2022-08-31 PROCEDURE — 17110 DESTRUCTION B9 LES UP TO 14: CPT | Performed by: DERMATOLOGY

## 2022-08-31 RX ORDER — IMIQUIMOD 12.5 MG/.25G
CREAM TOPICAL
Qty: 24 EACH | Refills: 11 | Status: SHIPPED | OUTPATIENT
Start: 2022-08-31

## 2022-08-31 ASSESSMENT — PAIN SCALES - GENERAL: PAINLEVEL: NO PAIN (1)

## 2022-08-31 NOTE — PROGRESS NOTES
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  # Rosacea/perioral dermatitis  - s/p PDL   - Current Tx: elidel +/- doxy  - Previous Tx: metrogel (gel too irritating)  # Spider angioma, nasal bridge  - s/p PDL 5/9/18, 11/21/18, 1/15/2020, 02/12/20  # Acne  - Current Tx: tretinoin 0.025% cream  # Benign bx:  - History of benign lesion biopsied on back per report  - [facility] patient  - Nevus pigmentosus, compound, left inferior breast, bx 4/11/14  - Lentigo, left breast, bx 8/7/2015  # Pressure induced urticaria bilateral feet  - Current Tx: Fexofenadine 180 mg BID  # Verruca right third finger  - cryotherapy 8/31/22 with imiquimod  ___________________________________________      ____________________________________________    Assessment & Plan:     # Verruca right third finger   - Cryotherapy performed today (see procedure note(s) below).   - Imiquimod three times per week after heals from cryotherapy       Procedures Performed:   - Cryotherapy procedure note, location(s): right third finger. After verbal consent and discussion of risks and benefits including, but not limited to, dyspigmentation/scar, blister, and pain, one lesion(s) was(were) treated with 1-2 mm freeze border for 1-2 cycles with liquid nitrogen. Post cryotherapy instructions were provided.      Follow-up: Keep October appointment    Staff:     Monica Velez MD  ____________________________________________    CC: Derm Problem (Caro is here today for a wart follow up )    HPI:  Ms. Lindsay Payne is a(n) 41 year old female who presents today as a return patient for a sooner appointment for a new problem.  She has developed a new painful and  occasionally bleeding wart of her finger.  She has tried OTC salicylic acid products which have not been helpful.  She has a history of warts of the hands in the past which were recalcitrant to treatment.  She did respond well to imiquimod in the past.     Patient is otherwise feeling well, without additional skin concerns.     Labs Reviewed:  N/A    Physical Exam:  Vitals: There were no vitals taken for this visit.  SKIN: Focused examination of hands was performed.  - There is a verrucous papule with thrombosed capillaries interrupting dermatoglyphics on the right third finger.  - No other lesions of concern on areas examined.     Medications:  Current Outpatient Medications   Medication     amLODIPine (NORVASC) 10 MG tablet     levonorgestrel (MIRENA) 20 MCG/24HR IUD     metroNIDAZOLE (METROCREAM) 0.75 % external cream     omeprazole (PRILOSEC) 20 MG CR capsule     pimecrolimus (ELIDEL) 1 % external cream     tacrolimus (PROTOPIC) 0.1 % external ointment     tretinoin (RETIN-A) 0.025 % external cream     amLODIPine (NORVASC) 10 MG tablet     doxycycline hyclate (VIBRAMYCIN) 100 MG capsule     No current facility-administered medications for this visit.      Past Medical History:   Patient Active Problem List   Diagnosis     CARDIOVASCULAR SCREENING; LDL GOAL LESS THAN 160     Paraguard intrauterine device     Gastroesophageal reflux disease, esophagitis presence not specified     Past Medical History:   Diagnosis Date     NO ACTIVE PROBLEMS      Tear of right meniscus as current injury 2020        Monica Velez MD  [address]  [place] [address] on close of this encounter.

## 2022-08-31 NOTE — LETTER
8/31/2022       RE: Lindsay Payne  [address] [place] [address]     Dear Colleague,    Thank you for referring your patient, Lindsay Payne, to the [facility]ERMATOLOGY [facility] [place] at [facility]. Please see a copy of my visit note below.    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  # Rosacea/perioral dermatitis  - s/p PDL   - Current Tx: elidel +/- doxy  - Previous Tx: metrogel (gel too irritating)  # Spider angioma, nasal bridge  - s/p PDL 5/9/18, 11/21/18, 1/15/2020, 02/12/20  # Acne  - Current Tx: tretinoin 0.025% cream  # Benign bx:  - History of benign lesion biopsied on back per report  - [facility] of [facility], non-dysplastic per patient  - Nevus pigmentosus, compound, left inferior breast, bx 4/11/14  - Lentigo, left breast, bx 8/7/2015  # Pressure induced urticaria bilateral feet  - Current Tx: Fexofenadine 180 mg BID  # Verruca right third finger  - cryotherapy 8/31/22 with imiquimod  ___________________________________________      ____________________________________________    Assessment & Plan:     # Verruca right third finger   - Cryotherapy performed today (see procedure note(s) below).   - Imiquimod three times per week after heals from cryotherapy       Procedures Performed:   - Cryotherapy procedure note, location(s): right third finger. After verbal consent and discussion of risks and benefits including, but not limited to, dyspigmentation/scar, blister, and pain, one lesion(s) was(were) treated with 1-2 mm freeze border for 1-2 cycles with liquid nitrogen. Post cryotherapy instructions were provided.      Follow-up: Keep October appointment    Staff:      Monica Velez MD  ____________________________________________    CC: Derm Problem (Caro is here today for a wart follow up )    HPI:  Ms. Lindsay Payne is a(n) 41 year old female who presents today as a return patient for a sooner appointment for a new problem.  She has developed a new painful and occasionally bleeding wart of her finger.  She has tried OTC salicylic acid products which have not been helpful.  She has a history of warts of the hands in the past which were recalcitrant to treatment.  She did respond well to imiquimod in the past.     Patient is otherwise feeling well, without additional skin concerns.     Labs Reviewed:  N/A    Physical Exam:  Vitals: There were no vitals taken for this visit.  SKIN: Focused examination of hands was performed.  - There is a verrucous papule with thrombosed capillaries interrupting dermatoglyphics on the right third finger.  - No other lesions of concern on areas examined.     Medications:  Current Outpatient Medications   Medication     amLODIPine (NORVASC) 10 MG tablet     levonorgestrel (MIRENA) 20 MCG/24HR IUD     metroNIDAZOLE (METROCREAM) 0.75 % external cream     omeprazole (PRILOSEC) 20 MG CR capsule     pimecrolimus (ELIDEL) 1 % external cream     tacrolimus (PROTOPIC) 0.1 % external ointment     tretinoin (RETIN-A) 0.025 % external cream     amLODIPine (NORVASC) 10 MG tablet     doxycycline hyclate (VIBRAMYCIN) 100 MG capsule     No current facility-administered medications for this visit.      Past Medical History:   Patient Active Problem List   Diagnosis     CARDIOVASCULAR SCREENING; LDL GOAL LESS THAN 160     Paraguard intrauterine device     Gastroesophageal reflux disease, esophagitis presence not specified     Past Medical History:   Diagnosis Date     NO ACTIVE PROBLEMS      Tear of right meniscus as current injury 2020       CC Monica Velez MD  [address]  [place] [address] on close of this encounter.

## 2022-08-31 NOTE — PATIENT INSTRUCTIONS
Cryotherapy    What is it?  Use of a very cold liquid, such as liquid nitrogen, to freeze and destroy abnormal skin cells that need to be removed    What should I expect?  Tenderness and redness  A small blister that might grow and fill with dark purple blood. There may be crusting.  More than one treatment may be needed if the lesions do not go away.    How do I care for the treated area?  Gently wash the area with your hands when bathing.  Use a thin layer of Vaseline to help with healing. You may use a Band-Aid.   The area should heal within 7-10 days and may leave behind a pink or lighter color.   Do not use an antibiotic or Neosporin ointment.   You may take acetaminophen (Tylenol) for pain.     Call your doctor if you have:  Severe pain  Signs of infection (warmth, redness, cloudy yellow drainage, and or a bad smell)  Questions or concerns    Who should I call with questions?      [facility]: [phone]      [facility]: [phone]      For urgent needs outside of business hours call the [facility] at [phone] and ask for the dermatology resident on call

## 2022-08-31 NOTE — NURSING NOTE
Dermatology Rooming Note    Lindsay Payne's goals for this visit include:   Chief Complaint   Patient presents with     Derm Problem     Caro is here today for a wart follow up      HALEY Lopez

## 2022-09-27 ENCOUNTER — OFFICE VISIT (OUTPATIENT)
Dept: OBGYN | Facility: CLINIC | Age: 42
End: 2022-09-27
Payer: COMMERCIAL

## 2022-09-27 VITALS
HEIGHT: 67 IN | SYSTOLIC BLOOD PRESSURE: 102 MMHG | DIASTOLIC BLOOD PRESSURE: 70 MMHG | WEIGHT: 151.4 LBS | BODY MASS INDEX: 23.76 KG/M2 | HEART RATE: 88 BPM | OXYGEN SATURATION: 100 %

## 2022-09-27 DIAGNOSIS — N92.6 IRREGULAR MENSES: Primary | ICD-10-CM

## 2022-09-27 DIAGNOSIS — Z23 NEED FOR PROPHYLACTIC VACCINATION AND INOCULATION AGAINST INFLUENZA: ICD-10-CM

## 2022-09-27 PROCEDURE — 90686 IIV4 VACC NO PRSV 0.5 ML IM: CPT | Performed by: OBSTETRICS & GYNECOLOGY

## 2022-09-27 PROCEDURE — 99212 OFFICE O/P EST SF 10 MIN: CPT | Mod: 25 | Performed by: OBSTETRICS & GYNECOLOGY

## 2022-09-27 PROCEDURE — 90471 IMMUNIZATION ADMIN: CPT | Performed by: OBSTETRICS & GYNECOLOGY

## 2022-09-27 NOTE — PROGRESS NOTES
S; Lindsay Payne is a 41 year old  who has had a mirena IUD for the past 5 years.  Overall she has really liked it and typically does not have a period since having it other than occasionally if she makes significant diet changes, she may have a period.  In august, they sold there home and bought a new one with a mortgage that is about double their current.  It is much larger and more space [place] [address] [place].  They have moved out of their old house and are living with her father, so it has been very stressful.  When this all began last month, she had another period and then again this month.  She feels it is related to the stress, but wasn't sure if she should change her IUD? The bleeding is not much or overly bothersome, but she did enjoy not really having periods up until now.  She has also noticed some increased pelvic discomfort and cramping, unsure if it is GI or related to hormones?    Past Medical History:   Diagnosis Date     NO ACTIVE PROBLEMS      Tear of right meniscus as current injury      Past Surgical History:   Procedure Laterality Date     APPENDECTOMY  1994     APPENDECTOMY  2009     OB History    Para Term  AB Living   2 2 2 0 0 2   SAB IAB Ectopic Multiple Live Births   0 0 0 0 2      # Outcome Date GA Lbr Jaron/2nd Weight Sex Delivery Anes PTL Lv   2 Term 09/26/15 40w0d / 00:30 3.43 kg (7 lb 9 oz) M Vag-Spont EPI  AISSATOU      Apgar1: 7  Apgar5: 9   1 Term 11 39w3d 08:55 / 02:24  M Vag-Spont EPI N AISSATOU      Name: NANY,BOY BABY LINDSAY      Apgar1: 9  Apgar5: 9      No Known Allergies      Current Outpatient Medications:      amLODIPine (NORVASC) 10 MG tablet, Take 1 tablet (10 mg) by mouth daily, Disp: 30 tablet, Rfl: 3     amLODIPine (NORVASC) 10 MG tablet, Take 1 tablet (10 mg) by mouth daily (Patient not taking: No sig reported), Disp: 30 tablet, Rfl: 0     doxycycline hyclate (VIBRAMYCIN) 100 MG capsule, Take 1 capsule (100 mg)  by mouth 2 times daily (Patient not taking: No sig reported), Disp: 20 capsule, Rfl: 0     imiquimod (ALDARA) 5 % external cream, Apply topically three times a week, Disp: 24 each, Rfl: 11     levonorgestrel (MIRENA) 20 MCG/24HR IUD, 1 each by Intrauterine route once, Disp: , Rfl:      metroNIDAZOLE (METROCREAM) 0.75 % external cream, Apply topically 2 times daily, Disp: 45 g, Rfl: 11     omeprazole (PRILOSEC) 20 MG CR capsule, Take 1 capsule (20 mg) by mouth daily, Disp: 90 capsule, Rfl: 1     pimecrolimus (ELIDEL) 1 % external cream, Apply topically 2 times daily, Disp: 30 g, Rfl: 3     tacrolimus (PROTOPIC) 0.1 % external ointment, Apply topically 2 times daily, Disp: 30 g, Rfl: 6     tretinoin (RETIN-A) 0.025 % external cream, Please apply a pea sized amount to entire face every other night for 2 weeks. If no irritation, can increase to nightly, Disp: 20 g, Rfl: 11    Social History     Socioeconomic History     Marital status:      Spouse name: Not on file     Number of children: Not on file     Years of education: Not on file     Highest education level: Not on file   Occupational History     Not on file   Tobacco Use     Smoking status: Never Smoker     Smokeless tobacco: Never Used   Substance and Sexual Activity     Alcohol use: Not Currently     Comment: 1-2 drinks per month     Drug use: Never     Sexual activity: Yes     Partners: Male     Birth control/protection: I.U.D.   Other Topics Concern     Parent/sibling w/ CABG, MI or angioplasty before 65F 55M? Not Asked   Social History Narrative    Caffeine intake/servings daily - 0    Calcium intake/servings daily - 3    Exercise 3 times weekly - describe jogs, weights    Sunscreen used - Yes    Seatbelts used - Yes    Guns stored in the home - No    Self Breast Exam - Yes    Pap test up to date -  Yes    Eye exam up to date -  Yes    Dental exam up to date -  Yes    DEXA scan up to date -  No    Flex Sig/Colonoscopy up to date -  No    Mammography  "up to date -  No    Immunizations reviewed and up to date - Yes    Abuse: Current or Past (Physical, Sexual or Emotional) - No    Do you feel safe in your environment - Yes    Do you cope well with stress - Yes    Do you suffer from insomnia - No    Last updated by: Monica Flynn  2/18/2015             Social Determinants of Health     Financial Resource Strain: Not on file   Food Insecurity: Not on file   Transportation Needs: Not on file   Physical Activity: Not on file   Stress: Not on file   Social Connections: Not on file   Intimate Partner Violence: Not on file   Housing Stability: Not on file     Family History   Problem Relation Age of Onset     Cancer Mother         malignant melanoma     Melanoma Mother      Arthritis Father         Bekhterev's disease-spinal suffication     Glaucoma Maternal Grandmother        Past medical, surgical, social and family history were reviewed and updated in EPIC.    PE: /70   Pulse 88   Ht 1.702 m (5' 7\")   Wt 68.7 kg (151 lb 6.4 oz)   SpO2 100%   BMI 23.71 kg/m      Gen: NAD    No further exam done    A/P; irregular menses   I explained that it is not concerning to have periods, even if infrequent or irregular with the mirena. It is certainly possible she may begin to have them more regularly since she has had it for 5 years.  If this is bothersome, we can swap it out prior to the 8 year jass.  We also discussed that other things may be contributing, like stress, etc and things may improve as they become settled in the new house.  I also explained other things can cause cramping and bleeding like ovarian cysts, malpositioned IUD, fibroid, polyp.  We will get a pelvic us and make sure IUD is correctly positioned and no other abnormal findings.  If normal, she can just let me know if/when she desires replacement prior to 2025.  Questions answered    ANDREY ROMERO MD    "

## 2022-11-09 ENCOUNTER — OFFICE VISIT (OUTPATIENT)
Dept: DERMATOLOGY | Facility: CLINIC | Age: 42
End: 2022-11-09
Payer: COMMERCIAL

## 2022-11-09 DIAGNOSIS — L71.0 PERIORAL DERMATITIS: Primary | ICD-10-CM

## 2022-11-09 DIAGNOSIS — B07.9 VERRUCA VULGARIS: ICD-10-CM

## 2022-11-09 DIAGNOSIS — L71.9 ROSACEA: ICD-10-CM

## 2022-11-09 PROCEDURE — 99214 OFFICE O/P EST MOD 30 MIN: CPT | Mod: GC | Performed by: DERMATOLOGY

## 2022-11-09 ASSESSMENT — PAIN SCALES - GENERAL: PAINLEVEL: NO PAIN (0)

## 2022-11-09 NOTE — PROGRESS NOTES
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  # Rosacea/perioral dermatitis  - s/p PDL   - Current Tx: elidel +/- doxy, metrocream  # Spider angioma, nasal bridge  - s/p PDL 5/9/18, 11/21/18, 1/15/2020, 02/12/20  # Acne  - Current Tx: tretinoin 0.025% cream  # Benign bx:  - History of benign lesion biopsied on back per report  - History of nevus excision left breast in [place], non-dysplastic per patient  - Nevus pigmentosus, compound, left inferior breast, bx 4/11/14  - Lentigo, left breast, bx 8/7/2015  # Pressure induced urticaria bilateral feet  - Current Tx: Fexofenadine 180 mg BID  # Verruca right third finger  - cryotherapy 8/31/22 with imiquimod      ____________________________________________    Assessment & Plan:     # ET rosacea, on metrocream, does not feel it is well-controlled. S/p PDL in 2018 with [facility]. Will add azaleic acid   - Continue metrocream   - Start azaleic acid   - Future: consider soolantra (I asked patient to send me a [employer] message in 8 to 12 weeks if not improved, and then would send Soolantra)    # Verruca vulgaris, 3rd finger, resolved.       Procedures Performed:   None.     Follow-up:6 months    Staff and Resident:      Oma Yang MD     The patient was seen and staffed with Dr. Agustin MD   I, Monica Velez MD, saw this patient with the resident and agree with the resident s findings and plan of care as documented in the resident s note.      ____________________________________________    CC: Derm Problem (Pt states she is here for follow-up rosacea on her face)    HPI:  Ms. Lindsay Payne is a(n) 42 year old female who presents today as a return patient to reevaluate wart and also discuss rosacea. Has wart that was treated  "on the 2nd finger in August now resolved. Still has a tiny one on the thumb but she has not been using her cream. Mainly she wants to talk about rosacea. She uses metrocream. However she still has significant facial flushing. She tried PDL in 2018 with [facility] and does not think it made a difference. Today is a \"good\" day 3/10 in severity.     Patient is otherwise feeling well, without additional skin concerns.     Labs Reviewed:  N/A    Physical Exam:  Vitals: There were no vitals taken for this visit.  SKIN: Focused examination of hands and face   - Verrucous papule at the distal first digit on the right  - mild erythema on the central face and on the periphery   - No other lesions of concern on areas examined.     Medications:  Current Outpatient Medications   Medication     imiquimod (ALDARA) 5 % external cream     levonorgestrel (MIRENA) 20 MCG/24HR IUD     metroNIDAZOLE (METROCREAM) 0.75 % external cream     omeprazole (PRILOSEC) 20 MG CR capsule     tacrolimus (PROTOPIC) 0.1 % external ointment     tretinoin (RETIN-A) 0.025 % external cream     amLODIPine (NORVASC) 10 MG tablet     amLODIPine (NORVASC) 10 MG tablet     doxycycline hyclate (VIBRAMYCIN) 100 MG capsule     pimecrolimus (ELIDEL) 1 % external cream     No current facility-administered medications for this visit.      Past Medical History:   Patient Active Problem List   Diagnosis     CARDIOVASCULAR SCREENING; LDL GOAL LESS THAN 160     Paraguard intrauterine device     Gastroesophageal reflux disease, esophagitis presence not specified     Past Medical History:   Diagnosis Date     NO ACTIVE PROBLEMS      Tear of right meniscus as current injury 2020        Monica Velez MD  [address] [facility] [address]  [place] [address] on close of this encounter.  "

## 2022-11-09 NOTE — LETTER
11/9/2022       RE: Lindsay Payne  [address]  [place] [facility] [address]     Dear Colleague,    Thank you for referring your patient, Lindsay Payne, to the [facility] DERMATOLOGY [facility]IC [place] at [facility]. Please see a copy of my visit note below.    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/18  # Rosacea/perioral dermatitis  - s/p PDL   - Current Tx: elidel +/- doxy  - Previous Tx: metrogel (gel too irritating)  # Spider angioma, nasal bridge  - s/p PDL 5/9/18, 11/21/18, 1/15/2020, 02/12/20  # Acne  - Current Tx: tretinoin 0.025% cream  # Benign bx:  - History of benign lesion biopsied on back per report  - [facility], non-dysplastic per patient  - Nevus pigmentosus, compound, left inferior breast, bx 4/11/14  - Lentigo, left breast, bx 8/7/2015  # Pressure induced urticaria bilateral feet  - Current Tx: Fexofenadine 180 mg BID  # Verruca right third finger  - cryotherapy 8/31/22 with imiquimod  ___________________________________________      ____________________________________________    Assessment & Plan:     # Verruca right third finger   - Cryotherapy performed today (see procedure note(s) below).   - Imiquimod three times per week after heals from cryotherapy       Procedures Performed:   - Cryotherapy procedure note, location(s): right third finger. After verbal consent and discussion of risks and benefits including, but not limited to, dyspigmentation/scar, blister, and pain, one lesion(s) was(were) treated with 1-2 mm freeze border for 1-2 cycles with liquid nitrogen. Post cryotherapy instructions were provided.      Follow-up: Keep October appointment    Staff:      Monica Velez MD  ____________________________________________    CC: Derm Problem (Pt states she is here for follow-up rosacea on her face)    HPI:  Ms. Lindsay Payne is a(n) 42 year old female who presents today as a return patient for a sooner appointment for a new problem.  She has developed a new painful and occasionally bleeding wart of her finger.  She has tried OTC salicylic acid products which have not been helpful.  She has a history of warts of the hands in the past which were recalcitrant to treatment.  She did respond well to imiquimod in the past.     Patient is otherwise feeling well, without additional skin concerns.     Labs Reviewed:  N/A    Physical Exam:  Vitals: There were no vitals taken for this visit.  SKIN: Focused examination of hands was performed.  - There is a verrucous papule with thrombosed capillaries interrupting dermatoglyphics on the right third finger.  - No other lesions of concern on areas examined.     Medications:  Current Outpatient Medications   Medication     imiquimod (ALDARA) 5 % external cream     levonorgestrel (MIRENA) 20 MCG/24HR IUD     metroNIDAZOLE (METROCREAM) 0.75 % external cream     omeprazole (PRILOSEC) 20 MG CR capsule     tacrolimus (PROTOPIC) 0.1 % external ointment     tretinoin (RETIN-A) 0.025 % external cream     amLODIPine (NORVASC) 10 MG tablet     amLODIPine (NORVASC) 10 MG tablet     doxycycline hyclate (VIBRAMYCIN) 100 MG capsule     pimecrolimus (ELIDEL) 1 % external cream     No current facility-administered medications for this visit.      Past Medical History:   Patient Active Problem List   Diagnosis     CARDIOVASCULAR SCREENING; LDL GOAL LESS THAN 160     Paraguard intrauterine device     Gastroesophageal reflux disease, esophagitis presence not specified     Past Medical History:   Diagnosis Date     NO ACTIVE PROBLEMS      Tear of right meniscus as current injury 2020       CC Monica Velez MD  [address] [place] [address]  [address]  [place] [address] on close of this encounter.    [facility] Dermatology Note  Encounter Date: Nov 9, 2022  Office Visit     Dermatology Problem List:  Last FBSE: 03/22/22     # Family history of melanoma   - mom  # History of DN  - Nevus pigmentosus, compound with moderate dysplasia, R breast, s/p excision 4/24/18  # Rosacea/perioral dermatitis  - s/p PDL   - Current Tx: elidel +/- doxy, metrocream  # Spider angioma, nasal bridge  - s/p PDL 5/9/18, 11/21/18, 1/15/2020, 02/12/20  # Acne  - Current Tx: tretinoin 0.025% cream  # Benign bx:  - History of benign lesion biopsied on back per report  - [facility], non-dysplastic [facility] patient  - Nevus pigmentosus, compound, left inferior breast, bx 4/11/14  - Lentigo, left breast, bx 8/7/2015  # Pressure induced urticaria bilateral feet  - Current Tx: Fexofenadine 180 mg BID  # Verruca right third finger  - cryotherapy 8/31/22 with imiquimod      ____________________________________________    Assessment & Plan:     # ET rosacea, on metrocream, does not feel it is well-controlled. S/p PDL in 2018 with [facility]. Will add azaleic acid   - Continue metrocream   - Start azaleic acid   - Future: consider soolantra (I asked patient to send me a [employer] message in 8 to 12 weeks if not improved, and then would send Soolantra)    # Verruca vulgaris, 3rd finger, resolved.       Procedures Performed:   None.     Follow-up:6 months    Staff and Resident:      Oma Yang MD     The patient was seen and staffed with Dr. Agustin MD   I, Monica Velez MD, saw this patient with the resident and agree with the resident s findings and plan of care as documented in the resident s note.      ____________________________________________    CC: Derm Problem (Pt states she is here for follow-up rosacea on her face)    HPI:  Ms. Lindsay Payne is a(n) 42 year old female who presents today as a return patient to  "reevaluate wart and also discuss rosacea. Has wart that was treated on the 2nd finger in August now resolved. Still has a tiny one on the thumb but she has not been using her cream. Mainly she wants to talk about rosacea. She uses metrocream. However she still has significant facial flushing. She tried PDL in 2018 with [facility] and does not think it made a difference. Today is a \"good\" day 3/10 in severity.     Patient is otherwise feeling well, without additional skin concerns.     Labs Reviewed:  N/A    Physical Exam:  Vitals: There were no vitals taken for this visit.  SKIN: Focused examination of hands and face   - Verrucous papule at the distal first digit on the right  - mild erythema on the central face and on the periphery   - No other lesions of concern on areas examined.     Medications:  Current Outpatient Medications   Medication     imiquimod (ALDARA) 5 % external cream     levonorgestrel (MIRENA) 20 MCG/24HR IUD     metroNIDAZOLE (METROCREAM) 0.75 % external cream     omeprazole (PRILOSEC) 20 MG CR capsule     tacrolimus (PROTOPIC) 0.1 % external ointment     tretinoin (RETIN-A) 0.025 % external cream     amLODIPine (NORVASC) 10 MG tablet     amLODIPine (NORVASC) 10 MG tablet     doxycycline hyclate (VIBRAMYCIN) 100 MG capsule     pimecrolimus (ELIDEL) 1 % external cream     No current facility-administered medications for this visit.      Past Medical History:   Patient Active Problem List   Diagnosis     CARDIOVASCULAR SCREENING; LDL GOAL LESS THAN 160     Paraguard intrauterine device     Gastroesophageal reflux disease, esophagitis presence not specified     Past Medical History:   Diagnosis Date     NO ACTIVE PROBLEMS      Tear of right meniscus as current injury 2020       CC Monica Velez MD  [address] [facility] [address]  [place] [address] on close of this encounter.    "

## 2022-11-09 NOTE — NURSING NOTE
Dermatology Rooming Note    Lindsay Payne's goals for this visit include:   Chief Complaint   Patient presents with     Derm Problem     Pt states she is here for follow-up rosacea on her face     Brandi Blanchard LPN

## 2022-11-09 NOTE — PROGRESS NOTES
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/18  # Rosacea/perioral dermatitis  - s/p PDL   - Current Tx: elidel +/- doxy  - Previous Tx: metrogel (gel too irritating)  # Spider angioma, nasal bridge  - s/p PDL 5/9/18, 11/21/18, 1/15/2020, 02/12/20  # Acne  - Current Tx: tretinoin 0.025% cream  # Benign bx:  - History of benign lesion biopsied on back per report  - [facility] patient  - Nevus pigmentosus, compound, left inferior breast, bx 4/11/14  - Lentigo, left breast, bx 8/7/2015  # Pressure induced urticaria bilateral feet  - Current Tx: Fexofenadine 180 mg BID  # Verruca right third finger  - cryotherapy 8/31/22 with imiquimod  ___________________________________________      ____________________________________________    Assessment & Plan:     # Verruca right third finger   - Cryotherapy performed today (see procedure note(s) below).   - Imiquimod three times per week after heals from cryotherapy       Procedures Performed:   - Cryotherapy procedure note, location(s): right third finger. After verbal consent and discussion of risks and benefits including, but not limited to, dyspigmentation/scar, blister, and pain, one lesion(s) was(were) treated with 1-2 mm freeze border for 1-2 cycles with liquid nitrogen. Post cryotherapy instructions were provided.      Follow-up: Keep October appointment    Staff:     Monica Velez MD  ____________________________________________    CC: Derm Problem (Pt states she is here for follow-up rosacea on her face)    HPI:  Ms. Lindsay Payne is a(n) 42 year old female who presents today as a return patient for a sooner appointment for a new problem.  She has developed a new painful  and occasionally bleeding wart of her finger.  She has tried OTC salicylic acid products which have not been helpful.  She has a history of warts of the hands in the past which were recalcitrant to treatment.  She did respond well to imiquimod in the past.     Patient is otherwise feeling well, without additional skin concerns.     Labs Reviewed:  N/A    Physical Exam:  Vitals: There were no vitals taken for this visit.  SKIN: Focused examination of hands was performed.  - There is a verrucous papule with thrombosed capillaries interrupting dermatoglyphics on the right third finger.  - No other lesions of concern on areas examined.     Medications:  Current Outpatient Medications   Medication     imiquimod (ALDARA) 5 % external cream     levonorgestrel (MIRENA) 20 MCG/24HR IUD     metroNIDAZOLE (METROCREAM) 0.75 % external cream     omeprazole (PRILOSEC) 20 MG CR capsule     tacrolimus (PROTOPIC) 0.1 % external ointment     tretinoin (RETIN-A) 0.025 % external cream     amLODIPine (NORVASC) 10 MG tablet     amLODIPine (NORVASC) 10 MG tablet     doxycycline hyclate (VIBRAMYCIN) 100 MG capsule     pimecrolimus (ELIDEL) 1 % external cream     No current facility-administered medications for this visit.      Past Medical History:   Patient Active Problem List   Diagnosis     CARDIOVASCULAR SCREENING; LDL GOAL LESS THAN 160     Paraguard intrauterine device     Gastroesophageal reflux disease, esophagitis presence not specified     Past Medical History:   Diagnosis Date     NO ACTIVE PROBLEMS      Tear of right meniscus as current injury 2020       CC Monica Velez MD  [address] [facility] [address]  [place] [address] on close of this encounter.

## 2022-11-11 ENCOUNTER — TELEPHONE (OUTPATIENT)
Dept: DERMATOLOGY | Facility: CLINIC | Age: 42
End: 2022-11-11

## 2022-11-11 DIAGNOSIS — L71.9 ROSACEA: Primary | ICD-10-CM

## 2022-11-11 NOTE — TELEPHONE ENCOUNTER
Prior Authorization Retail Medication Request    Medication/Dose: azelaic acid (AZELEX) 20 % external cream  ICD code (if different than what is on RX):    Previously Tried and Failed:    Rationale:      Insurance Name:  [employer]  Insurance ID:  [id number]

## 2022-11-14 NOTE — TELEPHONE ENCOUNTER
PA Initiation    Medication: azelaic acid (AZELEX) 20 % external cream   Insurance Company: [employer] - Phone [phone] Fax [phone]  Pharmacy Filling the Rx: [id number] - [place], [place] - [address]  Filling Pharmacy Phone: [phone]  Filling Pharmacy Fax: [phone]  Start Date: 11/14/2022

## 2022-11-15 NOTE — TELEPHONE ENCOUNTER
PRIOR AUTHORIZATION DENIED    Medication: azelaic acid (AZELEX) 20 % external cream--DENIED    Denial Date: 11/15/2022    Denial Rational: Per insurance rep medication is excluded

## 2022-11-16 NOTE — TELEPHONE ENCOUNTER
Called and verified patient by last name and . Informed patient that PA was denied. Asked patient if she would like to pay for medication out of pocket or if she would like message sent to Dr. Velez to see if there are alternative treatments as no options for appeal were given. Patient wants to see if Dr. Velez is willing to prescribe alternative. Patient had no questions or concerns.     Polo Doherty, EMT

## 2022-11-17 RX ORDER — IVERMECTIN 10 MG/G
CREAM TOPICAL
Qty: 45 G | Refills: 11 | Status: SHIPPED | OUTPATIENT
Start: 2022-11-17 | End: 2023-05-10

## 2022-11-18 NOTE — TELEPHONE ENCOUNTER
Writer called patient to let her know that Dr. Velez sent Soolantra to the [facility] pharmacy for patient as an alternative. Patient acknowledged, she stated that the pharmacy told her they are all out of stock on Soolantra, but are working on getting it stocked. Patient said she will wait for this medication and is appreciative.    Tana FRANCISCO RN

## 2022-11-18 NOTE — TELEPHONE ENCOUNTER
Monica Velez MD  [facility] Yesterday (8:15 AM)     KB  I sent the Soolantra to the [facility] pharmacy.  This is the other medication we discussed at her visit.  The computer tells me it is on her formulary but if it is not covered, have the patient call us back.       Polo Doherty Kimberly Ann, MD 2 days ago     BRUCE JENNINGS denied for azelaic acid cream for use on Rosacea. No options for appeal as medication is excluded from plan. Is there an alternative that can be prescribed? Patient does not want to pay out of pocket for medication. Please advise.     Polo Doherty EM

## 2022-12-09 ENCOUNTER — OFFICE VISIT (OUTPATIENT)
Dept: FAMILY MEDICINE | Facility: CLINIC | Age: 42
End: 2022-12-09
Payer: COMMERCIAL

## 2022-12-09 VITALS
TEMPERATURE: 97.4 F | HEIGHT: 67 IN | BODY MASS INDEX: 24.8 KG/M2 | OXYGEN SATURATION: 97 % | DIASTOLIC BLOOD PRESSURE: 69 MMHG | SYSTOLIC BLOOD PRESSURE: 102 MMHG | HEART RATE: 74 BPM | WEIGHT: 158 LBS

## 2022-12-09 DIAGNOSIS — Z23 HIGH PRIORITY FOR 2019-NCOV VACCINE: ICD-10-CM

## 2022-12-09 DIAGNOSIS — K21.9 GASTROESOPHAGEAL REFLUX DISEASE WITHOUT ESOPHAGITIS: Primary | ICD-10-CM

## 2022-12-09 PROCEDURE — 0134A COVID-19 VACCINE BIVALENT BOOSTER 18+ (MODERNA): CPT | Performed by: FAMILY MEDICINE

## 2022-12-09 PROCEDURE — 99214 OFFICE O/P EST MOD 30 MIN: CPT | Mod: 25 | Performed by: FAMILY MEDICINE

## 2022-12-09 PROCEDURE — 91313 COVID-19 VACCINE BIVALENT BOOSTER 18+ (MODERNA): CPT | Performed by: FAMILY MEDICINE

## 2022-12-09 RX ORDER — SUCRALFATE ORAL 1 G/10ML
1 SUSPENSION ORAL 4 TIMES DAILY
Qty: 414 ML | Refills: 1 | Status: ON HOLD | OUTPATIENT
Start: 2022-12-09 | End: 2024-09-24

## 2022-12-09 NOTE — PROGRESS NOTES
Assessment & Plan     Gastroesophageal reflux disease without esophagitis  - will add Carafate to her current regimen. Also stressed the importance of lifestyle changes. Drinks 2 espresso drinks daily- advice cutting back.   - omeprazole (PRILOSEC) 20 MG DR capsule; Take 1 capsule (20 mg) by mouth daily  - sucralfate (CARAFATE) 1 GM/10ML suspension; Take 10 mLs (1 g) by mouth 4 times daily    High priority for 2019-nCoV vaccine  - COVID-19,PF,MODERNA BIVALENT 18+Yrs      25 minutes spent on the date of the encounter doing chart review, history and exam, documentation and further activities per the note       See Patient Instructions    No follow-ups on file.   Follow-up Visit   Expected date:  Jan 09, 2023 (Approximate)      Follow Up Appointment Details:     Follow-up with whom?: PCP    Follow-Up for what?: Chronic Disease f/u    Chronic Disease f/u: General (Other)    Additional Details: reflux    How?: In Person                    Sheri Kennedy MD  [facility]    Joelle Elizondo is a 42 year old, presenting for the following health issues:  Gastrophageal Reflux      History of Present Illness       Reason for visit:  Heart burn  Symptom onset:  3-4 weeks ago    She eats 2-3 servings of fruits and vegetables daily.She consumes 0 sweetened beverage(s) daily.She exercises with enough effort to increase her heart rate 20 to 29 minutes per day.  She exercises with enough effort to increase her heart rate 4 days per week. She is missing 2 dose(s) of medications per week.       GERD/Heartburn  Onset/Duration: 3-4 weeks ago  Description: bad heartburns and esophagus pain, entire chest pain  Intensity: mild, moderate  Progression of Symptoms: improving  Accompanying Signs & Symptoms:  Does it feel like food gets stuck or trouble swallowing: No, but feels like scare tissue  Nausea: No  Vomiting (bloody?): No  Abdominal Pain: YES  Black-Tarry stools: No  Bloody stools:  "No  History:  Previous similar episodes: YES, chronic gastritis   Previous ulcers: No  Precipitating factors:    Caffeine use: YES  Alcohol use: No  NSAID/Aspirin use: No  Tobacco use: No  Worse with fatty foods, spicy foods and caffeinated drinks.    Therapies tried and outcome:             Lifestyle changes: diet change            Medications: Omeprazole (Prilosec), antacids and medication helpful mostly      Takes omeprazole but feels this is not helping as much. Started getting chest discomfort in October- there were times when it was hard to even swallow but this has since resolved.   Drinks two espressos daily. She has tried to cut back on spicy food intake.           Review of Systems   Constitutional, HEENT, cardiovascular, pulmonary, GI, , musculoskeletal, neuro, skin, endocrine and psych systems are negative, except as otherwise noted.      Objective    /69 (BP Location: Right arm, Patient Position: Sitting, Cuff Size: Adult Large)   Pulse 74   Temp 97.4  F (36.3  C) (Oral)   Ht 1.702 m (5' 7\")   Wt 71.7 kg (158 lb)   SpO2 97%   BMI 24.75 kg/m    Body mass index is 24.75 kg/m .  Physical Exam   GENERAL: healthy, alert and no distress  RESP: lungs clear to auscultation - no rales, rhonchi or wheezes  CV: regular rate and rhythm, normal S1 S2, no S3 or S4, no murmur, click or rub, no peripheral edema and peripheral pulses strong  ABDOMEN: tenderness epigastric and bowel sounds normal  PSYCH: mentation appears normal, affect normal/bright                "

## 2023-04-22 ENCOUNTER — HEALTH MAINTENANCE LETTER (OUTPATIENT)
Age: 43
End: 2023-04-22

## 2023-05-10 ENCOUNTER — OFFICE VISIT (OUTPATIENT)
Dept: DERMATOLOGY | Facility: CLINIC | Age: 43
End: 2023-05-10
Payer: COMMERCIAL

## 2023-05-10 DIAGNOSIS — L71.9 ROSACEA: Primary | ICD-10-CM

## 2023-05-10 DIAGNOSIS — L71.9 ACNE ROSACEA: ICD-10-CM

## 2023-05-10 PROCEDURE — 99213 OFFICE O/P EST LOW 20 MIN: CPT | Mod: GC | Performed by: DERMATOLOGY

## 2023-05-10 RX ORDER — TRETINOIN 0.25 MG/G
CREAM TOPICAL
Qty: 20 G | Refills: 11 | Status: SHIPPED | OUTPATIENT
Start: 2023-05-10

## 2023-05-10 RX ORDER — IVERMECTIN 10 MG/G
CREAM TOPICAL
Qty: 45 G | Refills: 11 | Status: SHIPPED | OUTPATIENT
Start: 2023-05-10

## 2023-05-10 RX ORDER — AZELAIC ACID 0.15 G/G
GEL TOPICAL
Qty: 50 G | Refills: 11 | Status: SHIPPED | OUTPATIENT
Start: 2023-05-10

## 2023-05-10 ASSESSMENT — PAIN SCALES - GENERAL: PAINLEVEL: NO PAIN (0)

## 2023-05-10 NOTE — NURSING NOTE
Dermatology Rooming Note    Lindsay Payne's goals for this visit include:   Chief Complaint   Patient presents with     Skin Check     FBSE. F/up. Has no known areas of concern      Rita Gil, Visit Facilitator

## 2023-05-10 NOTE — LETTER
5/10/2023       RE: Lindsay Payne  [address] [place] [address]     Dear Colleague,    Thank you for referring your patient, Lindsay Payne, to the [facility] DERMATOLOG[facility] [place] at [facility]. Please see a copy of my visit note below.    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  # Rosacea/perioral dermatitis  - Current Tx: Soolantra, azelaic acid, tretinoin 0.025  - Past tx: PDL, elidel +/- doxy, metrocream  # Spider angioma, nasal bridge  - s/p PDL 5/9/18, 11/21/18, 1/15/2020, 02/12/20  # Acne  - Current Tx: tretinoin 0.025% cream  # Benign bx:  - History of benign lesion biopsied on back per report  - History of nevus excision left breast in [facility] non-dysplastic per patient  - Nevus pigmentosus, compound, left inferior breast, bx 4/11/14  - Lentigo, left breast, bx 8/7/2015  # Pressure induced urticaria bilateral feet  - Current Tx: Fexofenadine 180 mg BID  # Verruca right third finger  - cryotherapy 8/31/22 with imiquimod    ____________________________________________    Assessment & Plan:     # ET rosacea  Overall well-controlled on current regimen. Not sure if metro cream has worked very much so would like to discontinue and simplify regimen today. Soolantra has helped the most with itching/irritation around the nose.   - Okay to stop metro cream given minimal efficacy   - Continue azelaic acid & Soolantra daily  - Continue tretinoin 0.025% nightly   - Patient will consider repeat PDL for nose; potentially in the fall  - Counseled on sun protection    Follow-up: 6 month(s) in-person, or earlier for new or changing lesions. Skin check next visit as well    Staff and Resident:     Attending: Dr. Velez staffed  the patient.    Resident: Hector Qiu MD (PGY-3)  I, Monica Velez MD, saw this patient with the resident and agree with the resident s findings and plan of care as documented in the resident s note.    ____________________________________________    CC: Skin Check (FBSE. F/up. Has no known areas of concern )    HPI:  Ms. Lindsay Payne is a(n) 42 year old female who presents today as a return patient for rosacea.   - Overall doing well on current regimen, including azelaic acid & Soolantra in the mornings & metrocream and tretinoin 0.025% in the evenings  - Not getting any papules; most bothered by the redness. PDL previously on cheeks didn't help a whole lot, but she's interested in potentially doing PDL for nose.  - Not sure if metro cream helps much  - Soolantra helps the most with irritation/itching around the nose  - Patient is otherwise feeling well, without additional skin concerns.    Labs Reviewed:  N/A    Physical Exam:  Vitals: There were no vitals taken for this visit.  SKIN: Acne exam, which includes the face, neck, upper central chest, and upper central back was performed.  - Mild erythema of cheeks and nose with scattered telangiectasias  - No other lesions of concern on areas examined.     Medications:  Current Outpatient Medications   Medication    azelaic acid (FINACIA) 15 % external gel    ivermectin (SOOLANTRA) 1 % cream    levonorgestrel (MIRENA) 20 MCG/24HR IUD    metroNIDAZOLE (METROCREAM) 0.75 % external cream    omeprazole (PRILOSEC) 20 MG DR capsule    tretinoin (RETIN-A) 0.025 % external cream    imiquimod (ALDARA) 5 % external cream    sucralfate (CARAFATE) 1 GM/10ML suspension     No current facility-administered medications for this visit.      Past Medical History:   Patient Active Problem List   Diagnosis    CARDIOVASCULAR SCREENING; LDL GOAL LESS THAN 160    Paraguard intrauterine device    Gastroesophageal reflux disease, esophagitis presence not specified     Past Medical  History:   Diagnosis Date    NO ACTIVE PROBLEMS     Tear of right meniscus as current injury 2020       CC Referred Self, MD  No address on file on close of this encounter.

## 2023-05-10 NOTE — PROGRESS NOTES
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  # Rosacea/perioral dermatitis  - Current Tx: Soolantra, azelaic acid, tretinoin 0.025  - Past tx: PDL, elidel +/- doxy, metrocream  # Spider angioma, nasal bridge  - s/p PDL 5/9/18, 11/21/18, 1/15/2020, 02/12/20  # Acne  - Current Tx: tretinoin 0.025% cream  # Benign bx:  - History of benign lesion biopsied on back per report  - History of nevus excision left breast in [facility], non-dysplastic per patient  - Nevus pigmentosus, compound, left inferior breast, bx 4/11/14  - Lentigo, left breast, bx 8/7/2015  # Pressure induced urticaria bilateral feet  - Current Tx: Fexofenadine 180 mg BID  # Verruca right third finger  - cryotherapy 8/31/22 with imiquimod    ____________________________________________    Assessment & Plan:     # ET rosacea  Overall well-controlled on current regimen. Not sure if metro cream has worked very much so would like to discontinue and simplify regimen today. Soolantra has helped the most with itching/irritation around the nose.   - Okay to stop metro cream given minimal efficacy   - Continue azelaic acid & Soolantra daily  - Continue tretinoin 0.025% nightly   - Patient will consider repeat PDL for nose; potentially in the fall  - Counseled on sun protection    Follow-up: 6 month(s) in-person, or earlier for new or changing lesions. Skin check next visit as well    Staff and Resident:     Attending: Dr. Velez staffed the patient.    Resident: Hector Qiu MD (PGY-3)  I, Monica Velez MD, saw this patient with the resident and agree with the resident s findings and plan of care as documented in the resident s note.    ____________________________________________    CC: Skin Check (FBSE. F/up. Has no known areas of concern  )    HPI:  Ms. Lindsay Payne is a(n) 42 year old female who presents today as a return patient for rosacea.   - Overall doing well on current regimen, including azelaic acid & Soolantra in the mornings & metrocream and tretinoin 0.025% in the evenings  - Not getting any papules; most bothered by the redness. PDL previously on cheeks didn't help a whole lot, but she's interested in potentially doing PDL for nose.  - Not sure if metro cream helps much  - Soolantra helps the most with irritation/itching around the nose  - Patient is otherwise feeling well, without additional skin concerns.    Labs Reviewed:  N/A    Physical Exam:  Vitals: There were no vitals taken for this visit.  SKIN: Acne exam, which includes the face, neck, upper central chest, and upper central back was performed.  - Mild erythema of cheeks and nose with scattered telangiectasias  - No other lesions of concern on areas examined.     Medications:  Current Outpatient Medications   Medication     azelaic acid (FINACIA) 15 % external gel     ivermectin (SOOLANTRA) 1 % cream     levonorgestrel (MIRENA) 20 MCG/24HR IUD     metroNIDAZOLE (METROCREAM) 0.75 % external cream     omeprazole (PRILOSEC) 20 MG DR capsule     tretinoin (RETIN-A) 0.025 % external cream     imiquimod (ALDARA) 5 % external cream     sucralfate (CARAFATE) 1 GM/10ML suspension     No current facility-administered medications for this visit.      Past Medical History:   Patient Active Problem List   Diagnosis     CARDIOVASCULAR SCREENING; LDL GOAL LESS THAN 160     Paraguard intrauterine device     Gastroesophageal reflux disease, esophagitis presence not specified     Past Medical History:   Diagnosis Date     NO ACTIVE PROBLEMS      Tear of right meniscus as current injury 2020       CC Referred Self, MD  No address on file on close of this encounter.

## 2023-05-12 ENCOUNTER — TELEPHONE (OUTPATIENT)
Dept: DERMATOLOGY | Facility: CLINIC | Age: 43
End: 2023-05-12
Payer: COMMERCIAL

## 2023-05-12 NOTE — TELEPHONE ENCOUNTER
Spoke to patient to schedule her 6m f/up with Dr. Velez. Patient stated now was not a good time and she will call back to schedule.

## 2023-06-27 ENCOUNTER — OFFICE VISIT (OUTPATIENT)
Dept: PEDIATRICS | Facility: CLINIC | Age: 43
End: 2023-06-27
Payer: COMMERCIAL

## 2023-06-27 VITALS
TEMPERATURE: 97.3 F | OXYGEN SATURATION: 100 % | HEIGHT: 67 IN | WEIGHT: 165 LBS | DIASTOLIC BLOOD PRESSURE: 60 MMHG | BODY MASS INDEX: 25.9 KG/M2 | HEART RATE: 64 BPM | SYSTOLIC BLOOD PRESSURE: 106 MMHG | RESPIRATION RATE: 16 BRPM

## 2023-06-27 DIAGNOSIS — Z13.220 SCREENING CHOLESTEROL LEVEL: ICD-10-CM

## 2023-06-27 DIAGNOSIS — K21.9 GASTROESOPHAGEAL REFLUX DISEASE WITHOUT ESOPHAGITIS: ICD-10-CM

## 2023-06-27 DIAGNOSIS — L60.8 TOENAIL DEFORMITY: ICD-10-CM

## 2023-06-27 DIAGNOSIS — Z13.1 SCREENING FOR DIABETES MELLITUS: ICD-10-CM

## 2023-06-27 DIAGNOSIS — Z12.31 VISIT FOR SCREENING MAMMOGRAM: ICD-10-CM

## 2023-06-27 DIAGNOSIS — Z00.00 ROUTINE GENERAL MEDICAL EXAMINATION AT A HEALTH CARE FACILITY: Primary | ICD-10-CM

## 2023-06-27 DIAGNOSIS — Z97.5 IUD (INTRAUTERINE DEVICE) IN PLACE: ICD-10-CM

## 2023-06-27 PROCEDURE — 99213 OFFICE O/P EST LOW 20 MIN: CPT | Mod: 25 | Performed by: STUDENT IN AN ORGANIZED HEALTH CARE EDUCATION/TRAINING PROGRAM

## 2023-06-27 PROCEDURE — 99396 PREV VISIT EST AGE 40-64: CPT | Performed by: STUDENT IN AN ORGANIZED HEALTH CARE EDUCATION/TRAINING PROGRAM

## 2023-06-27 ASSESSMENT — ENCOUNTER SYMPTOMS
CHILLS: 0
MYALGIAS: 0
PALPITATIONS: 0
NAUSEA: 0
SORE THROAT: 0
DYSURIA: 0
CONSTIPATION: 0
HEARTBURN: 0
HEADACHES: 0
DIZZINESS: 0
WEAKNESS: 0
PARESTHESIAS: 0
JOINT SWELLING: 0
DIARRHEA: 0
HEMATOCHEZIA: 0
FREQUENCY: 0
ABDOMINAL PAIN: 0
ARTHRALGIAS: 0
HEMATURIA: 0
COUGH: 0
BREAST MASS: 0
FEVER: 0
EYE PAIN: 0
NERVOUS/ANXIOUS: 0
SHORTNESS OF BREATH: 0

## 2023-06-27 ASSESSMENT — PAIN SCALES - GENERAL: PAINLEVEL: NO PAIN (0)

## 2023-06-27 NOTE — PATIENT INSTRUCTIONS
"You can make the \"lab only\" appointment through [facility] or by calling us at [phone].      Radiology scheduling phone number: [phone]  For the ultrasound at [facility]    Preventive Health Recommendations  Female Ages 40 to 49    Yearly exam:   See your health care provider every year in order to  Review health changes.   Discuss preventive care.    Review your medicines if your doctor prescribed any.    Get a Pap test every three years (unless you have an abnormal result and your provider advises testing more often).    If you get Pap tests with HPV test, you only need to test every 5 years, unless you have an abnormal result. You do not need a Pap test if your uterus was removed (hysterectomy) and you have not had cancer.    You should be tested each year for STDs (sexually transmitted diseases), if you're at risk.   Ask your doctor if you should have a mammogram.    Have a colonoscopy (test for colon cancer) if someone in your family has had colon cancer or polyps before age 50.     Have a cholesterol test every 5 years.     Have a diabetes test (fasting glucose) after age 45. If you are at risk for diabetes, you should have this test every 3 years.    Shots: Get a flu shot each year. Get a tetanus shot every 10 years.     Nutrition:   Eat at least 5 servings of fruits and vegetables each day.  Eat whole-grain bread, whole-wheat pasta and brown rice instead of white grains and rice.  Get adequate Calcium and Vitamin D.      Lifestyle  Exercise at least 150 minutes a week (an average of 30 minutes a day, 5 days a week). This will help you control your weight and prevent disease.  Limit alcohol to one drink per day.  No smoking.   Wear sunscreen to prevent skin cancer.  See your dentist every six months for an exam and cleaning.  "

## 2023-06-27 NOTE — PROGRESS NOTES
"   SUBJECTIVE:   CC: Caro is an 42 year old who presents for preventive health visit.       2023    11:24 AM   Additional Questions   Roomed by Traice         2023    11:24 AM   Patient Reported Additional Medications   Patient reports taking the following new medications None     Healthy Habits:    Getting at least 3 servings of Calcium per day:  Yes    Bi-annual eye exam:  Yes    Dental care twice a year:  Yes    Sleep apnea or symptoms of sleep apnea:  None    Diet:  Regular (no restrictions)    Frequency of exercise:  4-5 days/week    Duration of exercise:  30-45 minutes    Taking medications regularly:  Not Applicable    Barriers to taking medications:  None    Medication side effects:  None    PHQ-2 Total Score:    Additional concerns today:  No      Moved from [facility] to [place]  Last primary care provider left clinic    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 (For example, a Health Directive, POLST, or a discussion with a medical provider or your loved ones about your wishes): No, advance care planning information given to patient to review.  Patient declined advance care planning discussion at this time.    Social History     Tobacco Use     Smoking status: Never     Smokeless tobacco: Never   Substance Use Topics     Alcohol use: Not Currently     Comment: 1-2 drinks per month             2023    11:17 AM   Alcohol Use   Prescreen: >3 drinks/day or >7 drinks/week? No     Reviewed orders with patient.  Reviewed health maintenance and updated orders accordingly - Yes    Breast Cancer Screenin/27/2023    11:18 AM   Breast CA Risk " Assessment (FHS-7)   Do you have a family history of breast, colon, or ovarian cancer? No / Unknown     Mammogram Screening - Offered annual screening and updated Health Maintenance for [place] plan based on risk factor consideration    Pertinent mammograms are reviewed under the imaging tab.    History of abnormal Pap smear: NO - age 30-65 PAP every 5 years with negative HPV co-testing recommended      Latest Ref Rng & Units 2/12/2021     9:55 AM 7/6/2017    11:38 AM 7/6/2017    11:18 AM   PAP / HPV   PAP Negative for squamous intraepithelial lesion or malignancy. Negative for squamous intraepithelial lesion or malignancy  Electronically signed by Nathalie May CT (ASCP) on 2/19/2021 at 12:37 PM        PAP (Historical)    NIL    HPV 16 DNA NEG Negative  Negative     HPV 18 DNA NEG Negative  Negative     Other HR HPV NEG Negative  Negative       Reviewed and updated as needed this visit by clinical staff   Tobacco  Allergies  Meds              Reviewed and updated as needed this visit by Provider                   Review of Systems   Constitutional: Negative for chills and fever.   HENT: Negative for congestion, ear pain, hearing loss and sore throat.    Eyes: Negative for pain and visual disturbance.   Respiratory: Negative for cough and shortness of breath.    Cardiovascular: Negative for chest pain, palpitations and peripheral edema.   Gastrointestinal: Negative for abdominal pain, constipation, diarrhea, heartburn, hematochezia and nausea.   Breasts:  Positive for tenderness. Negative for breast mass and discharge.   Genitourinary: Negative for dysuria, frequency, genital sores, hematuria, pelvic pain, urgency, vaginal bleeding and vaginal discharge.   Musculoskeletal: Negative for arthralgias, joint swelling and myalgias.   Skin: Negative for rash.   Neurological: Negative for dizziness, weakness, headaches and paresthesias.   Psychiatric/Behavioral: Negative for mood changes. The patient is not  "nervous/anxious.      OBJECTIVE:   /60   Pulse 64   Temp 97.3  F (36.3  C)   Resp 16   Ht 1.689 m (5' 6.5\")   Wt 74.8 kg (165 lb)   SpO2 100%   BMI 26.23 kg/m    Physical Exam  GENERAL: healthy, alert and no distress  EYES: Eyes grossly normal to inspection, and conjunctivae and sclerae normal  HENT: ear canals and TM's normal, nose and mouth without ulcers or lesions  NECK: no adenopathy, no asymmetry, masses, or scars and thyroid normal to palpation  RESP: lungs clear to auscultation - no rales, rhonchi or wheezes  CV: regular rate and rhythm, normal S1 S2, no S3 or S4, no murmur, click or rub, no peripheral edema   ABDOMEN: soft, nontender   (female): normal female external genitalia, normal urethral meatus, vaginal mucosa pink, moist, well rugated, and normal cervix without masses or discharge; IUD (intrauterine device) strings visualized protruding from cervical os  MS: no gross musculoskeletal defects noted, no edema  SKIN: no suspicious lesions or rashes  NEURO: Normal strength and tone, mentation intact and speech normal  PSYCH: mentation appears normal, affect normal/bright    ASSESSMENT/PLAN:       ICD-10-CM    1. Routine general medical examination at a health care facility  Z00.00       2. IUD (intrauterine device) in place  Z97.5 US Pelvic Complete with Transvaginal      3. Toenail deformity  L60.8 Orthopedic  Referral      4. Screening for diabetes mellitus  Z13.1 Comprehensive metabolic panel (BMP + Alb, Alk Phos, ALT, AST, Total. Bili, TP)     Hemoglobin A1c     CANCELED: Comprehensive metabolic panel (BMP + Alb, Alk Phos, ALT, AST, Total. Bili, TP)     CANCELED: Hemoglobin A1c      5. Screening cholesterol level  Z13.220 Lipid panel reflex to direct LDL Fasting     CANCELED: Lipid panel reflex to direct LDL Fasting      6. Gastroesophageal reflux disease without esophagitis  K21.9 omeprazole (PRILOSEC) 20 MG DR capsule      7. Visit for screening mammogram  Z12.31 MA Screen " "Bilateral w/Julio        2. Having more spotting. Reports OB provider had ordered ultrasound but would have gone to [place]? Reordered and recommended can go to [facility] in [place].  3. Recommend podiatry evaluation for removal.     Will return for fasting labs given paternal family history of hyperlipidemia on PCSK9i.    COUNSELING:  Reviewed preventive health counseling, as reflected in patient instructions      BMI:   Estimated body mass index is 26.23 kg/m  as calculated from the following:    Height as of this encounter: 1.689 m (5' 6.5\").    Weight as of this encounter: 74.8 kg (165 lb).         She reports that she has never smoked. She has never used smokeless tobacco.          Suzette Crews MD  [facility]  "

## 2023-07-21 ENCOUNTER — ANCILLARY PROCEDURE (OUTPATIENT)
Dept: ULTRASOUND IMAGING | Facility: CLINIC | Age: 43
End: 2023-07-21
Attending: STUDENT IN AN ORGANIZED HEALTH CARE EDUCATION/TRAINING PROGRAM
Payer: COMMERCIAL

## 2023-07-21 DIAGNOSIS — Z97.5 IUD (INTRAUTERINE DEVICE) IN PLACE: ICD-10-CM

## 2023-07-21 PROCEDURE — 76830 TRANSVAGINAL US NON-OB: CPT | Performed by: OBSTETRICS & GYNECOLOGY

## 2023-07-21 PROCEDURE — 76856 US EXAM PELVIC COMPLETE: CPT | Performed by: OBSTETRICS & GYNECOLOGY

## 2023-08-04 ENCOUNTER — HOSPITAL ENCOUNTER (OUTPATIENT)
Dept: MAMMOGRAPHY | Facility: CLINIC | Age: 43
Discharge: HOME | End: 2023-08-04
Attending: STUDENT IN AN ORGANIZED HEALTH CARE EDUCATION/TRAINING PROGRAM | Admitting: STUDENT IN AN ORGANIZED HEALTH CARE EDUCATION/TRAINING PROGRAM
Payer: COMMERCIAL

## 2023-08-04 DIAGNOSIS — Z12.31 VISIT FOR SCREENING MAMMOGRAM: ICD-10-CM

## 2023-08-04 PROCEDURE — 77067 SCR MAMMO BI INCL CAD: CPT

## 2023-11-03 ENCOUNTER — IMMUNIZATION (OUTPATIENT)
Dept: FAMILY MEDICINE | Facility: CLINIC | Age: 43
End: 2023-11-03
Payer: COMMERCIAL

## 2023-11-03 DIAGNOSIS — Z23 NEED FOR PROPHYLACTIC VACCINATION AND INOCULATION AGAINST INFLUENZA: Primary | ICD-10-CM

## 2023-11-03 DIAGNOSIS — Z23 HIGH PRIORITY FOR 2019-NCOV VACCINE: ICD-10-CM

## 2023-11-03 PROCEDURE — 90686 IIV4 VACC NO PRSV 0.5 ML IM: CPT

## 2023-11-03 PROCEDURE — 91320 SARSCV2 VAC 30MCG TRS-SUC IM: CPT

## 2023-11-03 PROCEDURE — 99207 PR NO CHARGE NURSE ONLY: CPT

## 2023-11-03 PROCEDURE — 90471 IMMUNIZATION ADMIN: CPT

## 2023-11-03 PROCEDURE — 90480 ADMN SARSCOV2 VAC 1/ONLY CMP: CPT

## 2023-11-03 NOTE — PROGRESS NOTES
Prior to immunization administration, verified patients identity using patient s name and date of birth. Please see Immunization Activity for additional information.     Screening Questionnaire for Adult Immunization    Are you sick today?   No   Do you have allergies to medications, food, a vaccine component or latex?   No   Have you ever had a serious reaction after receiving a vaccination?   No   Do you have a long-term health problem with heart, lung, kidney, or metabolic disease (e.g., diabetes), asthma, a blood disorder, no spleen, complement component deficiency, a cochlear implant, or a spinal fluid leak?  Are you on long-term aspirin therapy?   No   Do you have cancer, leukemia, HIV/AIDS, or any other immune system problem?   No   Do you have a parent, brother, or sister with an immune system problem?   No   In the past 3 months, have you taken medications that affect  your immune system, such as prednisone, other steroids, or anticancer drugs; drugs for the treatment of rheumatoid arthritis, Crohn s disease, or psoriasis; or have you had radiation treatments?   No   Have you had a seizure, or a brain or other nervous system problem?   No   During the past year, have you received a transfusion of blood or blood    products, or been given immune (gamma) globulin or antiviral drug?   No   For women: Are you pregnant or is there a chance you could become       pregnant during the next month?   No   Have you received any vaccinations in the past 4 weeks?   No     Immunization questionnaire answers were all negative.    I have reviewed the following standing orders:   This patient is due and qualifies for the Covid-19 vaccine.     Click here for COVID-19 Standing Order    I have reviewed the vaccines inclusion and exclusion criteria; No concerns regarding eligibility.     This patient is due and qualifies for the Influenza vaccine.    Click here for Influenza Vaccine Standing Order    I have reviewed the  vaccines inclusion and exclusion criteria; No concerns regarding eligibility.     Patient instructed to remain in clinic for 15 minutes afterwards, and to report any adverse reactions.     Screening performed by April Freeman MA on 11/3/2023 at 10:03 AM.

## 2023-12-06 ENCOUNTER — OFFICE VISIT (OUTPATIENT)
Dept: PODIATRY | Facility: CLINIC | Age: 43
End: 2023-12-06
Payer: COMMERCIAL

## 2023-12-06 VITALS — SYSTOLIC BLOOD PRESSURE: 108 MMHG | DIASTOLIC BLOOD PRESSURE: 60 MMHG

## 2023-12-06 DIAGNOSIS — L60.8 CHANGE IN NAIL APPEARANCE: ICD-10-CM

## 2023-12-06 DIAGNOSIS — M79.675 GREAT TOE PAIN, LEFT: Primary | ICD-10-CM

## 2023-12-06 DIAGNOSIS — L60.0 INGROWING NAIL, LEFT GREAT TOE: ICD-10-CM

## 2023-12-06 PROCEDURE — 99203 OFFICE O/P NEW LOW 30 MIN: CPT | Performed by: PODIATRIST

## 2023-12-06 NOTE — PATIENT INSTRUCTIONS
Thank you for choosing [facility]!    DR. MORGAN'S CLINIC LOCATIONS:     [facility] TRIAGE LINE: [phone]   [address] APPOINTMENTS: [phone]   [place] [address] RADIOLOGY: [phone]   (Every other Tues - Wed - Fri PM) SET UP SURGERY: [phone]    PHYSICAL THERAPY: [phone]   [facility] SPECIALTY BILLING QUESTIONS: [phone] [address] FAX: [phone]   [place] [address]    (Thurs & Fri AM)     POST-PERMANENT NAIL REMOVAL  1. Over-the-counter pain medication (tylenol / ibuprofen), elevating your foot, and ice application to the top of the foot is all that you will need for pain control.    2. Some bleeding is normal. If bleeding seems excessive to you, place ice on top of your foot for 15-20 minutes and elevate your foot above heart level.   3. Keep bandage on until this evening or tomorrow morning. If the bandage falls off or if it feels too tight , start the soaking process below.  4. Soaking instructions:   a. Soak your foot twice a day for 15 minutes in a mild solution of warm water and soap for a minimum of 2-4 weeks. If your toe is still draining at 4 weeks, continue with soaking.    b. It s okay to soak your foot for a few minutes to loosen the bandage applied at your appointment.   c. After soaking, use a Q-tip to clean under and around the skin where the nail was removed. This helps get rid of the brownish material and helps the wound drain.    d. Blot your toe dry and apply a band-aid.  5. It is normal to experience some discomfort and redness around the nail for 1-2 weeks following the procedure. Drainage will likely appear brownish and thick at times. This is normal. It might drain for up to 2 months.  7. Initial discomfort might last 2-3 days. You may resume with regular activities at that time, as long as you keep the wound clean and follow the soaking instructions. It is recommended that you do not enter a  public swimming pool or hot tub while your toe is draining.   8. After 2-3 days, if you are experiencing worsening pain and redness, or notice pus, please contact the clinic. Ask to speak with a triage nurse and they will inform our team of your symptoms and we can advise if a follow up is needed.     - What is an ingrown toenail? An ingrown toenail is a medical condition usually caused by a nail edge irritating the neighboring soft tissue and skin. It can cause pain and lead to infection.  - What causes ingrown toenails? There are likely multiple causes of ingrown toenails, including nail shape and width, narrow shoes, a person s activity level, and likely family history of nail problems.  - Risks of not treating condition: If left untreated, some people endure ongoing tenderness and pain. The biggest concern is infection from bacteria entering through the damage soft tissue.  - How is it treated? Some people achieve relief by soaking their feet and trimming the edge of the nail. If an infection has developed, then an oral antibiotic can be prescribed, but the condition often returns after the course of the medication is completed. Often self treatment is not successful and treatment by a qualified medical provider is needed. This often involves numbing the toe with a local anesthetic and then either removing the edge of a nail or the entire nail.  - Risks of surgery? As with any form of surgery, infections is a risk. However, a person is probably at greater risk for infection if the ingrown toenail is left untreated. Nail removal is a common, simple, and fairly quick procedure that in most cases is done in the physician's office. If an infection exists, often the only treatment that is successful is removal.

## 2023-12-06 NOTE — LETTER
12/6/2023         RE: Lindsay Payne  [address] [place] [address]        Dear Colleague,    Thank you for referring your patient, Lindsay Payne, to the [facility]. Please see a copy of my visit note below.    ASSESSMENT:  Encounter Diagnoses   Name Primary?     Intermittent great toe pain, left Yes     Change in nail appearance      Ingrowing nail, left great toe      MEDICAL DECISION MAKING:  Given that her toe pain is likely nail related, this is considered a form of ingrown toenail.  In her case it is intermittent.  We discussed the likelihood that nail changes are from the repetitive trauma on the nail unit from running, even with appropriate footwear.  She inquired about permanent removal of this nail plate.  I discussed with the procedure involved as well as the typical postprocedure course.    Her pain is not just along an edge.  He can be more general pain of the nail unit.    She admitted that she is having doubts or second-guessing about the procedure.  Because it is elective, I suggest she not have it done.  She seems to be able to live with it and manage it.  If it becomes more painful in severity and/or frequency, I am happy to assist with removal in the future.    She was okay with this.    Proper shoe sizing, nail trimming and filing, taping the nail plate when running for protection discussed.    Disclaimer: This note consists of symbols derived from keyboarding, dictation and/or voice recognition software. As a result, there may be errors in the script that have gone undetected. Please consider this when interpreting information found in this chart.    Ajit De Leon, ANIL, FACFAS, [facility] Department of Podiatry/Foot & Ankle Surgery      ____________________________________________________________________    HPI:       Caro presents today reporting a problem with her left great toenail unit.  She has intermittent pain and is considering having the  nail removed.  She is having more pain when she made the appointment.  Throbbing  Pain rating as high as a 7 out of 10  Comes and goes  She is an avid runner  She has a history of this nail falling off.    *  Past Medical History:   Diagnosis Date     NO ACTIVE PROBLEMS      Tear of right meniscus as current injury 2020   *  *  Past Surgical History:   Procedure Laterality Date     APPENDECTOMY  1994     APPENDECTOMY  2009   *  *  Current Outpatient Medications   Medication Sig Dispense Refill     azelaic acid (FINACIA) 15 % external gel Daily 50 g 11     imiquimod (ALDARA) 5 % external cream Apply topically three times a week (Patient not taking: Reported on 5/10/2023) 24 each 11     ivermectin (SOOLANTRA) 1 % cream Apply to the affected areas of the face once daily. Use a pea-size amount for each area of the face (forehead, chin, nose, each cheek) that is affected. Spread as a thin layer, avoiding the eyes and lips. 45 g 11     levonorgestrel (MIRENA) 20 MCG/24HR IUD 1 each by Intrauterine route once       omeprazole (PRILOSEC) 20 MG DR capsule Take 1 capsule (20 mg) by mouth daily 90 capsule 4     sucralfate (CARAFATE) 1 GM/10ML suspension Take 10 mLs (1 g) by mouth 4 times daily (Patient not taking: Reported on 5/10/2023) 414 mL 1     tretinoin (RETIN-A) 0.025 % external cream Please apply a pea sized amount to entire face every other night for 2 weeks. If no irritation, can increase to nightly 20 g 11         EXAM:    Vitals: /60   BMI: There is no height or weight on file to calculate BMI.    Constitutional:  Lindsay Payne is in no apparent distress, appears well-nourished.  Cooperative with history and physical exam.    Vascular:  Pedal pulses are palpable for both the DP and PT arteries.  CFT < 3 sec.  No edema.      Neuro: Light touch sensation is intact to the L4, L5, S1 distributions  No evidence of weakness, spasticity, or contracture in the lower extremities.     Derm: Normal texture and turgor.   No erythema, ecchymosis, or cyanosis.  No open lesions.  The left hallux nail is mildly thickened.  There is some dystrophia at the distal lateral nail plate.  No pain on palpation today.    Musculoskeletal:    Lower extremity muscle strength is normal. No gross deformities.          Again, thank you for allowing me to participate in the care of your patient.        Sincerely,        Ajit De Leon DPM

## 2023-12-06 NOTE — PROGRESS NOTES
ASSESSMENT:  Encounter Diagnoses   Name Primary?    Intermittent great toe pain, left Yes    Change in nail appearance     Ingrowing nail, left great toe      MEDICAL DECISION MAKING:  Given that her toe pain is likely nail related, this is considered a form of ingrown toenail.  In her case it is intermittent.  We discussed the likelihood that nail changes are from the repetitive trauma on the nail unit from running, even with appropriate footwear.  She inquired about permanent removal of this nail plate.  I discussed with the procedure involved as well as the typical postprocedure course.    Her pain is not just along an edge.  He can be more general pain of the nail unit.    She admitted that she is having doubts or second-guessing about the procedure.  Because it is elective, I suggest she not have it done.  She seems to be able to live with it and manage it.  If it becomes more painful in severity and/or frequency, I am happy to assist with removal in the future.    She was okay with this.    Proper shoe sizing, nail trimming and filing, taping the nail plate when running for protection discussed.    Disclaimer: This note consists of symbols derived from keyboarding, dictation and/or voice recognition software. As a result, there may be errors in the script that have gone undetected. Please consider this when interpreting information found in this chart.    Ajit De Leon DPM, FACFAS, MS    [place] Department of Podiatry/Foot & Ankle Surgery      ____________________________________________________________________    HPI:       Caro presents today reporting a problem with her left great toenail unit.  She has intermittent pain and is considering having the nail removed.  She is having more pain when she made the appointment.  Throbbing  Pain rating as high as a 7 out of 10  Comes and goes  She is an avid runner  She has a history of this nail falling off.    *  Past Medical History:   Diagnosis Date    NO ACTIVE  PROBLEMS     Tear of right meniscus as current injury 2020   *  *  Past Surgical History:   Procedure Laterality Date    APPENDECTOMY  1994    APPENDECTOMY  2009   *  *  Current Outpatient Medications   Medication Sig Dispense Refill    azelaic acid (FINACIA) 15 % external gel Daily 50 g 11    imiquimod (ALDARA) 5 % external cream Apply topically three times a week (Patient not taking: Reported on 5/10/2023) 24 each 11    ivermectin (SOOLANTRA) 1 % cream Apply to the affected areas of the face once daily. Use a pea-size amount for each area of the face (forehead, chin, nose, each cheek) that is affected. Spread as a thin layer, avoiding the eyes and lips. 45 g 11    levonorgestrel (MIRENA) 20 MCG/24HR IUD 1 each by Intrauterine route once      omeprazole (PRILOSEC) 20 MG DR capsule Take 1 capsule (20 mg) by mouth daily 90 capsule 4    sucralfate (CARAFATE) 1 GM/10ML suspension Take 10 mLs (1 g) by mouth 4 times daily (Patient not taking: Reported on 5/10/2023) 414 mL 1    tretinoin (RETIN-A) 0.025 % external cream Please apply a pea sized amount to entire face every other night for 2 weeks. If no irritation, can increase to nightly 20 g 11         EXAM:    Vitals: /60   BMI: There is no height or weight on file to calculate BMI.    Constitutional:  Lindsay Payne is in no apparent distress, appears well-nourished.  Cooperative with history and physical exam.    Vascular:  Pedal pulses are palpable for both the DP and PT arteries.  CFT < 3 sec.  No edema.      Neuro: Light touch sensation is intact to the L4, L5, S1 distributions  No evidence of weakness, spasticity, or contracture in the lower extremities.     Derm: Normal texture and turgor.  No erythema, ecchymosis, or cyanosis.  No open lesions.  The left hallux nail is mildly thickened.  There is some dystrophia at the distal lateral nail plate.  No pain on palpation today.    Musculoskeletal:    Lower extremity muscle strength is normal. No gross  deformities.

## 2024-02-15 ENCOUNTER — OFFICE VISIT (OUTPATIENT)
Dept: OBGYN | Facility: CLINIC | Age: 44
End: 2024-02-15
Payer: COMMERCIAL

## 2024-02-15 VITALS
HEIGHT: 67 IN | DIASTOLIC BLOOD PRESSURE: 72 MMHG | SYSTOLIC BLOOD PRESSURE: 112 MMHG | BODY MASS INDEX: 26.23 KG/M2 | TEMPERATURE: 97 F | HEART RATE: 83 BPM

## 2024-02-15 DIAGNOSIS — N89.8 VAGINAL ITCHING: ICD-10-CM

## 2024-02-15 DIAGNOSIS — Z30.433 ENCOUNTER FOR REMOVAL AND REINSERTION OF INTRAUTERINE CONTRACEPTIVE DEVICE: Primary | ICD-10-CM

## 2024-02-15 LAB
CLUE CELLS: ABNORMAL
HCG UR QL: NEGATIVE
TRICHOMONAS, WET PREP: ABNORMAL
WBC'S/HIGH POWER FIELD, WET PREP: ABNORMAL
YEAST, WET PREP: ABNORMAL

## 2024-02-15 PROCEDURE — 58301 REMOVE INTRAUTERINE DEVICE: CPT | Performed by: OBSTETRICS & GYNECOLOGY

## 2024-02-15 PROCEDURE — 87210 SMEAR WET MOUNT SALINE/INK: CPT | Performed by: OBSTETRICS & GYNECOLOGY

## 2024-02-15 PROCEDURE — 58300 INSERT INTRAUTERINE DEVICE: CPT | Performed by: OBSTETRICS & GYNECOLOGY

## 2024-02-15 PROCEDURE — 81025 URINE PREGNANCY TEST: CPT | Performed by: OBSTETRICS & GYNECOLOGY

## 2024-02-15 NOTE — PROGRESS NOTES
SUBJECTIVE:    Is a pregnancy test required: Yes.  Was it positive or negative?  Negative  Was a consent obtained?  Yes    Subjective: Lindsay Payne is a 43 year old  presents for IUD and desires mirena type IUD.  She requests removal of the IUD because the IUD effectiveness has     Patient has been given the opportunity to ask questions about all forms of birth control, including all options appropriate for Lindsay Payne. Discussed that no method of birth control, except abstinence is 100% effective against pregnancy or sexually transmitted infection.     Lindsay Payne understands she may have the IUD removed at any time. IUD should be removed by a health care provider and the current IUD will be removed today.    The entire removal and insertion procedure was reviewed with the patient, including care after placement.  She has some increased vaginal discharge as well.  No itching, odor or irritation.    Today's PHQ-2 Score:       2/15/2024     3:27 PM   PHQ-2 (  [employer])   Q1: Little interest or pleasure in doing things 0   Q2: Feeling down, depressed or hopeless 0   PHQ-2 Score 0       PROCEDURE:    Premedicated with ibuprofen.  A speculum exam was performed and the cervix was visualized. Wet prep done. The IUD string was not visualized. Using alligator forceps, the string  was grasped inside the cervix and the IUD removed intact.    Under sterile technique, cervix was visualized with speculum and prepped with Betadine solution swab x 3. Tenaculum was placed for stability. The uterus was gently straightened and sounded to 7.0 cm. IUD prepared for placement, and IUD inserted according to 's instructions without difficulty or significant ressitance, and deployed at the fundus. The strings were visualized and trimmed to 3.0 cm from the external os. Tenaculum was removed and hemostasis noted. Speculum removed.  Patient tolerated procedure well.    EBL: minimal    Complications:  none      POST PROCEDURE:    Given 's handouts, including when to have IUD removed, list of danger s/sx, side effects and follow up recommended. Encouraged condom use for prevention of STD. Advised to call for any fever, for prolonged or severe pain or bleeding, abnormal vaginal dischage, or unable to palpate strings. She was advised to use pain medications (ibuprofen) as needed for mild to moderate pain. Advised to follow-up in clinic in 4-6 weeks for IUD string check if unable to find strings or as directed by provider.     Bina Pino MD

## 2024-02-15 NOTE — PATIENT INSTRUCTIONS

## 2024-05-28 ENCOUNTER — PATIENT OUTREACH (OUTPATIENT)
Dept: CARE COORDINATION | Facility: CLINIC | Age: 44
End: 2024-05-28
Payer: COMMERCIAL

## 2024-06-11 ENCOUNTER — PATIENT OUTREACH (OUTPATIENT)
Dept: CARE COORDINATION | Facility: CLINIC | Age: 44
End: 2024-06-11
Payer: COMMERCIAL

## 2024-08-19 ENCOUNTER — HOSPITAL ENCOUNTER (OUTPATIENT)
Dept: MAMMOGRAPHY | Facility: CLINIC | Age: 44
Discharge: HOME | End: 2024-08-19
Attending: STUDENT IN AN ORGANIZED HEALTH CARE EDUCATION/TRAINING PROGRAM | Admitting: STUDENT IN AN ORGANIZED HEALTH CARE EDUCATION/TRAINING PROGRAM
Payer: COMMERCIAL

## 2024-08-19 DIAGNOSIS — Z12.31 VISIT FOR SCREENING MAMMOGRAM: ICD-10-CM

## 2024-08-19 PROCEDURE — 77063 BREAST TOMOSYNTHESIS BI: CPT

## 2024-08-23 ENCOUNTER — MYC REFILL (OUTPATIENT)
Dept: PEDIATRICS | Facility: CLINIC | Age: 44
End: 2024-08-23
Payer: COMMERCIAL

## 2024-08-23 DIAGNOSIS — K21.9 GASTROESOPHAGEAL REFLUX DISEASE WITHOUT ESOPHAGITIS: ICD-10-CM

## 2024-09-07 ENCOUNTER — HEALTH MAINTENANCE LETTER (OUTPATIENT)
Age: 44
End: 2024-09-07

## 2024-09-09 ENCOUNTER — OFFICE VISIT (OUTPATIENT)
Dept: DERMATOLOGY | Facility: CLINIC | Age: 44
End: 2024-09-09
Payer: COMMERCIAL

## 2024-09-09 DIAGNOSIS — D22.9 MULTIPLE NEVI: ICD-10-CM

## 2024-09-09 DIAGNOSIS — L71.9 ACNE ROSACEA: Primary | ICD-10-CM

## 2024-09-09 PROCEDURE — 99214 OFFICE O/P EST MOD 30 MIN: CPT | Performed by: DERMATOLOGY

## 2024-09-09 RX ORDER — TRETINOIN 0.5 MG/G
CREAM TOPICAL AT BEDTIME
Qty: 45 G | Refills: 11 | Status: SHIPPED | OUTPATIENT
Start: 2024-09-09

## 2024-09-09 ASSESSMENT — PAIN SCALES - GENERAL: PAINLEVEL: NO PAIN (0)

## 2024-09-09 NOTE — LETTER
9/9/2024       RE: Lindsay Payne  [address] [place] [address]     Dear Colleague,    Thank you for referring your patient, Lindsay Payne, to the [facility] [place] [facility]. Please see a copy of my visit note below.    [facility] Dermatology Note  Encounter Date: Sep 9, 2024  Office Visit     Dermatology Problem List:  # Rosacea.     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 Some mild rosacea and acne still present so increased tretinoin from 0.025 to 0.05 today.     #Benign: cherry angioma, nevi, dermatofibroma, dermal mole  -No treatment needed    #erythematous, itchy skin around nose  -Patient to send my-chart message of photo during flare to recommend treatment.  Consider protopic or ketoconazole cream    Follow-up: 1 year(s) in-person, or earlier for new or changing lesions    Staff and Medical Student:     Vilma Combs,MS3  I was present with the medical student who participated in the service and in the documentation of the note. I have verified the history and personally performed the physical exam and medical decision making. I agree with the assessment and plan of care as documented in the note.  Monica Velez MD   ____________________________________________    CC: Annual skin check + rosacea.   HPI:  Ms. Lindsay Payne is a(n) 43 year old female who presents today as a return patient for annual skin check and rosacea.     Rosacea has stayed the  same over the past several months. Mostly bothered by the redness. Overall doing well on current treatment including soolantra, azelaic acid and tretinoin. Patient uses azelaic acid and soolantra PRN (daily during the winter) and tretinoin daily. Uses otc facial exfoliant weekly. PDL did not lead to much improvement.     Patient also reports in the winter gets red, itchiness around the nose and nasal sinus area, that is not dry or flaky, but is not present today. Patient has tried vanicream daily.     Family history of melanoma (mother)    Patient is otherwise feeling well, without additional skin concerns.    Labs:  None reviewed.    Physical Exam:  Vitals: There were no vitals taken for this visit.  SKIN: Total skin excluding the undergarment areas was performed. The exam included the head/face, neck, both arms, chest, back, abdomen, both legs, digits and/or nails.   -Mild erythema of cheeks/ nose with scattered telangiectasias   - Several brown macules on back, trunk and arms  - Few pink papules on back, arms, and trunk   -   - No other lesions of concern on areas examined.     Medications:  Current Outpatient Medications   Medication Sig Dispense Refill     azelaic acid (FINACIA) 15 % external gel Daily (Patient taking differently: Using as needed.) 50 g 11     ivermectin (SOOLANTRA) 1 % cream Apply to the affected areas of the face once daily. Use a pea-size amount for each area of the face (forehead, chin, nose, each cheek) that is affected. Spread as a thin layer, avoiding the eyes and lips. 45 g 11     levonorgestrel (MIRENA) 20 MCG/24HR IUD 1 each by Intrauterine route once       levonorgestrel (MIRENA) 52 MG (20 mcg/day) IUD 1 each by Intrauterine route once       omeprazole (PRILOSEC) 20 MG DR capsule Take 1 capsule (20 mg) by mouth daily. 90 capsule 0     tretinoin (RETIN-A) 0.025 % external cream Please apply a pea sized amount to entire face every other night for 2 weeks. If no irritation, can increase  to nightly 20 g 11     tretinoin (RETIN-A) 0.05 % external cream Apply topically at bedtime. 45 g 11     imiquimod (ALDARA) 5 % external cream Apply topically three times a week (Patient not taking: Reported on 5/10/2023) 24 each 11     sucralfate (CARAFATE) 1 GM/10ML suspension Take 10 mLs (1 g) by mouth 4 times daily (Patient not taking: Reported on 5/10/2023) 414 mL 1     No current facility-administered medications for this visit.      Past Medical History:   Patient Active Problem List   Diagnosis     CARDIOVASCULAR SCREENING; LDL GOAL LESS THAN 160     Gastroesophageal reflux disease, esophagitis presence not specified     IUD (intrauterine device) in place     Encounter for removal and reinsertion of intrauterine contraceptive device     Past Medical History:   Diagnosis Date     NO ACTIVE PROBLEMS      Tear of right meniscus as current injury 2020        CC Referred Self, MD  No address on file on close of this encounter.      Again, thank you for allowing me to participate in the care of your patient.      Sincerely,    Monica Velez MD

## 2024-09-09 NOTE — NURSING NOTE
Dermatology Rooming Note    Lindsay Payne's goals for this visit include:   Chief Complaint   Patient presents with    Derm Problem     Annual skin check. The rosacea is slowly getting worse.      Messi Crain, EMT  Clinic Support  [facility]     [phone]    Employed by [employer] [facility]

## 2024-09-09 NOTE — PROGRESS NOTES
[facility] Dermatology Note  Encounter Date: Sep 9, 2024  Office Visit     Dermatology Problem List:  # Rosacea.     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 Some mild rosacea and acne still present so increased tretinoin from 0.025 to 0.05 today.     #Benign: cherry angioma, nevi, dermatofibroma, dermal mole  -No treatment needed    #erythematous, itchy skin around nose  -Patient to send my-chart message of photo during flare to recommend treatment.  Consider protopic or ketoconazole cream    Follow-up: 1 year(s) in-person, or earlier for new or changing lesions    Staff and Medical Student:     Vilma Combs,MS3  I was present with the medical student who participated in the service and in the documentation of the note. I have verified the history and personally performed the physical exam and medical decision making. I agree with the assessment and plan of care as documented in the note.  Monica Velez MD   ____________________________________________    CC: Annual skin check + rosacea.   HPI:  Ms. Lindsay Payne is a(n) 43 year old female who presents today as a return patient for annual skin check and rosacea.     Rosacea has stayed the same over the past several months. Mostly bothered by the redness. Overall doing well on current treatment including soolantra, azelaic acid and tretinoin. Patient uses azelaic acid and soolantra PRN (daily during the winter) and tretinoin daily. Uses otc facial exfoliant weekly. PDL did not lead to much  improvement.     Patient also reports in the winter gets red, itchiness around the nose and nasal sinus area, that is not dry or flaky, but is not present today. Patient has tried vanicream daily.     Family history of melanoma (mother)    Patient is otherwise feeling well, without additional skin concerns.    Labs:  None reviewed.    Physical Exam:  Vitals: There were no vitals taken for this visit.  SKIN: Total skin excluding the undergarment areas was performed. The exam included the head/face, neck, both arms, chest, back, abdomen, both legs, digits and/or nails.   -Mild erythema of cheeks/ nose with scattered telangiectasias   - Several brown macules on back, trunk and arms  - Few pink papules on back, arms, and trunk   -   - No other lesions of concern on areas examined.     Medications:  Current Outpatient Medications   Medication Sig Dispense Refill    azelaic acid (FINACIA) 15 % external gel Daily (Patient taking differently: Using as needed.) 50 g 11    ivermectin (SOOLANTRA) 1 % cream Apply to the affected areas of the face once daily. Use a pea-size amount for each area of the face (forehead, chin, nose, each cheek) that is affected. Spread as a thin layer, avoiding the eyes and lips. 45 g 11    levonorgestrel (MIRENA) 20 MCG/24HR IUD 1 each by Intrauterine route once      levonorgestrel (MIRENA) 52 MG (20 mcg/day) IUD 1 each by Intrauterine route once      omeprazole (PRILOSEC) 20 MG DR capsule Take 1 capsule (20 mg) by mouth daily. 90 capsule 0    tretinoin (RETIN-A) 0.025 % external cream Please apply a pea sized amount to entire face every other night for 2 weeks. If no irritation, can increase to nightly 20 g 11    tretinoin (RETIN-A) 0.05 % external cream Apply topically at bedtime. 45 g 11    imiquimod (ALDARA) 5 % external cream Apply topically three times a week (Patient not taking: Reported on 5/10/2023) 24 each 11    sucralfate (CARAFATE) 1 GM/10ML suspension Take 10 mLs (1 g) by mouth 4 times  daily (Patient not taking: Reported on 5/10/2023) 414 mL 1     No current facility-administered medications for this visit.      Past Medical History:   Patient Active Problem List   Diagnosis    CARDIOVASCULAR SCREENING; LDL GOAL LESS THAN 160    Gastroesophageal reflux disease, esophagitis presence not specified    IUD (intrauterine device) in place    Encounter for removal and reinsertion of intrauterine contraceptive device     Past Medical History:   Diagnosis Date    NO ACTIVE PROBLEMS     Tear of right meniscus as current injury 2020        CC Referred Self, MD  No address on file on close of this encounter.

## 2024-09-12 ENCOUNTER — OFFICE VISIT (OUTPATIENT)
Dept: PEDIATRICS | Facility: CLINIC | Age: 44
End: 2024-09-12
Payer: COMMERCIAL

## 2024-09-12 VITALS
TEMPERATURE: 97.6 F | RESPIRATION RATE: 16 BRPM | OXYGEN SATURATION: 99 % | WEIGHT: 170 LBS | HEIGHT: 67 IN | HEART RATE: 68 BPM | DIASTOLIC BLOOD PRESSURE: 60 MMHG | SYSTOLIC BLOOD PRESSURE: 94 MMHG | BODY MASS INDEX: 26.68 KG/M2

## 2024-09-12 DIAGNOSIS — K21.9 GASTROESOPHAGEAL REFLUX DISEASE, UNSPECIFIED WHETHER ESOPHAGITIS PRESENT: ICD-10-CM

## 2024-09-12 DIAGNOSIS — Z00.00 ROUTINE GENERAL MEDICAL EXAMINATION AT A HEALTH CARE FACILITY: Primary | ICD-10-CM

## 2024-09-12 LAB
ALBUMIN SERPL BCG-MCNC: 4.5 G/DL (ref 3.5–5.2)
ALP SERPL-CCNC: 66 U/L (ref 40–150)
ALT SERPL W P-5'-P-CCNC: 15 U/L (ref 0–50)
ANION GAP SERPL CALCULATED.3IONS-SCNC: 8 MMOL/L (ref 7–15)
AST SERPL W P-5'-P-CCNC: 25 U/L (ref 0–45)
BILIRUB SERPL-MCNC: 0.7 MG/DL
BUN SERPL-MCNC: 14 MG/DL (ref 6–20)
CALCIUM SERPL-MCNC: 9.1 MG/DL (ref 8.8–10.4)
CHLORIDE SERPL-SCNC: 104 MMOL/L (ref 98–107)
CHOLEST SERPL-MCNC: 162 MG/DL
CREAT SERPL-MCNC: 0.79 MG/DL (ref 0.51–0.95)
EGFRCR SERPLBLD CKD-EPI 2021: >90 ML/MIN/1.73M2
ERYTHROCYTE [DISTWIDTH] IN BLOOD BY AUTOMATED COUNT: 12 % (ref 10–15)
ERYTHROCYTE [SEDIMENTATION RATE] IN BLOOD BY WESTERGREN METHOD: 8 MM/HR (ref 0–20)
FASTING STATUS PATIENT QL REPORTED: YES
FASTING STATUS PATIENT QL REPORTED: YES
FERRITIN SERPL-MCNC: 76 NG/ML (ref 6–175)
GLUCOSE SERPL-MCNC: 89 MG/DL (ref 70–99)
HBA1C MFR BLD: 5.2 % (ref 0–5.6)
HCO3 SERPL-SCNC: 26 MMOL/L (ref 22–29)
HCT VFR BLD AUTO: 41.9 % (ref 35–47)
HDLC SERPL-MCNC: 39 MG/DL
HGB BLD-MCNC: 14.2 G/DL (ref 11.7–15.7)
LDLC SERPL CALC-MCNC: 110 MG/DL
MCH RBC QN AUTO: 31.4 PG (ref 26.5–33)
MCHC RBC AUTO-ENTMCNC: 33.9 G/DL (ref 31.5–36.5)
MCV RBC AUTO: 93 FL (ref 78–100)
NONHDLC SERPL-MCNC: 123 MG/DL
PLATELET # BLD AUTO: 227 10E3/UL (ref 150–450)
POTASSIUM SERPL-SCNC: 4.1 MMOL/L (ref 3.4–5.3)
PROT SERPL-MCNC: 7.4 G/DL (ref 6.4–8.3)
RBC # BLD AUTO: 4.52 10E6/UL (ref 3.8–5.2)
SODIUM SERPL-SCNC: 138 MMOL/L (ref 135–145)
TRIGL SERPL-MCNC: 64 MG/DL
TSH SERPL DL<=0.005 MIU/L-ACNC: 1.15 UIU/ML (ref 0.3–4.2)
WBC # BLD AUTO: 5.4 10E3/UL (ref 4–11)

## 2024-09-12 PROCEDURE — 99213 OFFICE O/P EST LOW 20 MIN: CPT | Mod: 25 | Performed by: STUDENT IN AN ORGANIZED HEALTH CARE EDUCATION/TRAINING PROGRAM

## 2024-09-12 PROCEDURE — 80061 LIPID PANEL: CPT | Performed by: STUDENT IN AN ORGANIZED HEALTH CARE EDUCATION/TRAINING PROGRAM

## 2024-09-12 PROCEDURE — 90471 IMMUNIZATION ADMIN: CPT | Performed by: STUDENT IN AN ORGANIZED HEALTH CARE EDUCATION/TRAINING PROGRAM

## 2024-09-12 PROCEDURE — 83036 HEMOGLOBIN GLYCOSYLATED A1C: CPT | Performed by: STUDENT IN AN ORGANIZED HEALTH CARE EDUCATION/TRAINING PROGRAM

## 2024-09-12 PROCEDURE — 99396 PREV VISIT EST AGE 40-64: CPT | Mod: 25 | Performed by: STUDENT IN AN ORGANIZED HEALTH CARE EDUCATION/TRAINING PROGRAM

## 2024-09-12 PROCEDURE — 85027 COMPLETE CBC AUTOMATED: CPT | Performed by: STUDENT IN AN ORGANIZED HEALTH CARE EDUCATION/TRAINING PROGRAM

## 2024-09-12 PROCEDURE — 82784 ASSAY IGA/IGD/IGG/IGM EACH: CPT | Performed by: STUDENT IN AN ORGANIZED HEALTH CARE EDUCATION/TRAINING PROGRAM

## 2024-09-12 PROCEDURE — 80053 COMPREHEN METABOLIC PANEL: CPT | Performed by: STUDENT IN AN ORGANIZED HEALTH CARE EDUCATION/TRAINING PROGRAM

## 2024-09-12 PROCEDURE — 86364 TISS TRNSGLTMNASE EA IG CLAS: CPT | Performed by: STUDENT IN AN ORGANIZED HEALTH CARE EDUCATION/TRAINING PROGRAM

## 2024-09-12 PROCEDURE — 84443 ASSAY THYROID STIM HORMONE: CPT | Performed by: STUDENT IN AN ORGANIZED HEALTH CARE EDUCATION/TRAINING PROGRAM

## 2024-09-12 PROCEDURE — 90656 IIV3 VACC NO PRSV 0.5 ML IM: CPT | Performed by: STUDENT IN AN ORGANIZED HEALTH CARE EDUCATION/TRAINING PROGRAM

## 2024-09-12 PROCEDURE — 82728 ASSAY OF FERRITIN: CPT | Performed by: STUDENT IN AN ORGANIZED HEALTH CARE EDUCATION/TRAINING PROGRAM

## 2024-09-12 PROCEDURE — 36415 COLL VENOUS BLD VENIPUNCTURE: CPT | Performed by: STUDENT IN AN ORGANIZED HEALTH CARE EDUCATION/TRAINING PROGRAM

## 2024-09-12 PROCEDURE — 85652 RBC SED RATE AUTOMATED: CPT | Performed by: STUDENT IN AN ORGANIZED HEALTH CARE EDUCATION/TRAINING PROGRAM

## 2024-09-12 ASSESSMENT — PAIN SCALES - GENERAL: PAINLEVEL: NO PAIN (0)

## 2024-09-12 NOTE — PROGRESS NOTES
"Preventive Care Visit  [facility] Deirdre E. Milligan, MD, Internal Medicine - Pediatrics  Sep 12, 2024      Assessment & Plan     Routine general medical examination at a health care facility  - Lipid panel reflex to direct LDL Fasting; Future  - Comprehensive metabolic panel (BMP + Alb, Alk Phos, ALT, AST, Total. Bili, TP); Future  - Hemoglobin A1c; Future  - TSH with free T4 reflex; Future  - Lipid panel reflex to direct LDL Fasting  - Comprehensive metabolic panel (BMP + Alb, Alk Phos, ALT, AST, Total. Bili, TP)  - Hemoglobin A1c  - TSH with free T4 reflex    Gastroesophageal reflux disease, unspecified whether esophagitis present  Patient on PPI with worsening symptoms into chest/esophagus. Differential diagnosis includes esophageal spasm, peptic ulcer disease, GERD, EOE. Recommend upper endoscopy (last had >20 years ago), labs per below. Pending endoscopy results may benefit from gastroenterology evaluation vs BID PPI vs vonoprozan.  - Tissue transglutaminase martha IgA and IgG; Future  - IgA; Future  - ESR: Erythrocyte sedimentation rate; Future  - CBC with platelets; Future  - Ferritin; Future  - Adult GI  Referral - Procedure Only; Future  - Tissue transglutaminase martha IgA and IgG  - IgA  - ESR: Erythrocyte sedimentation rate  - CBC with platelets  - Ferritin            BMI  Estimated body mass index is 27.03 kg/m  as calculated from the following:    Height as of this encounter: 1.689 m (5' 6.5\").    Weight as of this encounter: 77.1 kg (170 lb).       Counseling  Appropriate preventive services were addressed with this patient via screening, questionnaire, or discussion as appropriate for fall prevention, nutrition, physical activity, Tobacco-use cessation, social engagement, weight loss and cognition.  Checklist reviewing preventive services available has been given to the patient.  Reviewed patient's diet, addressing concerns and/or questions.           Joelle   Caro is a " 43 year old, presenting for the following:  Physical        9/12/2024     8:57 AM   Additional Questions   Roomed by Tracie         9/12/2024     8:57 AM   Patient Reported Additional Medications   Patient reports taking the following new medications None        Health Care Directive  Patient does not have a Health Care Directive or Living Will: Discussed advance care planning with patient; information given to patient to review.    HPI    Weight  -counting calories  -lift weights, cut back on running and doing other work outs  -midsection     11 year old kid    Had endoscopy first at age 13 and had several since then  -new: esophagus involvement  -fear of esophageal cancer  -on PPI  -getting spasms: wake up at night  -blueberries cause acid reflux  -most days taking omeprazole (Prilosec) but sometimes taking two  -cut back on coffee  -last endoscopy was 18                9/12/2024   General Health   How would you rate your overall physical health? Excellent   Feel stress (tense, anxious, or unable to sleep) To some extent      (!) STRESS CONCERN      9/12/2024   Nutrition   Three or more servings of calcium each day? Yes   Diet: Carbohydrate counting   How many servings of fruit and vegetables per day? (!) 0-1   How many sweetened beverages each day? 0-1            9/12/2024   Exercise   Days per week of moderate/strenous exercise 4 days   Average minutes spent exercising at this level 30 min            9/12/2024   Social Factors   Frequency of gathering with friends or relatives Once a week   Worry food won't last until get money to buy more No   Food not last or not have enough money for food? No   Do you have housing? (Housing is defined as stable permanent housing and does not include staying ouside in a car, in a tent, in an abandoned building, in an overnight shelter, or couch-surfing.) Yes   Are you worried about losing your housing? No   Lack of transportation? No   Unable to get utilities  (heat,electricity)? No            9/12/2024   Dental   Dentist two times every year? Yes            9/12/2024   TB Screening   Were you born outside of the [place]? Yes              Today's PHQ-2 Score:       2/15/2024     3:27 PM   PHQ-2 ( 1999 [employer])   Q1: Little interest or pleasure in doing things 0   Q2: Feeling down, depressed or hopeless 0   PHQ-2 Score 0         9/12/2024   Substance Use   Alcohol more than 3/day or more than 7/wk No   Do you use any other substances recreationally? No        Social History     Tobacco Use    Smoking status: Never    Smokeless tobacco: Never   Vaping Use    Vaping status: Never Used   Substance Use Topics    Alcohol use: Not Currently     Comment: 1-2 drinks per month    Drug use: Never           8/19/2024   LAST FHS-7 RESULTS   1st degree relative breast or ovarian cancer No   Any relative bilateral breast cancer No   Any male have breast cancer No   Any ONE woman have BOTH breast AND ovarian cancer No   Any woman with breast cancer before 50yrs No   2 or more relatives with breast AND/OR ovarian cancer No   2 or more relatives with breast AND/OR bowel cancer No                   9/12/2024   STI Screening   New sexual partner(s) since last STI/HIV test? No        History of abnormal Pap smear: No - age 30- 64 PAP with HPV every 5 years recommended        Latest Ref Rng & Units 2/12/2021     9:55 AM 7/6/2017    11:38 AM 7/6/2017    11:18 AM   PAP / HPV   PAP Negative for squamous intraepithelial lesion or malignancy. Negative for squamous intraepithelial lesion or malignancy  Electronically signed by Nathalie May CT (ASCP) on 2/19/2021 at 12:37 PM        PAP (Historical)    NIL    HPV 16 DNA NEG Negative  Negative     HPV 18 DNA NEG Negative  Negative     Other HR HPV NEG Negative  Negative       ASCVD Risk   The 10-year ASCVD risk score (Du BARRETO, et al., 2019) is: 0.5%    Values used to calculate the score:      Age: 43 years      Sex: Female      Is  "Non- : No      Diabetic: No      Tobacco smoker: No      Systolic Blood Pressure: 94 mmHg      Is BP treated: No      HDL Cholesterol: 42 mg/dL      Total Cholesterol: 181 mg/dL        9/12/2024   Contraception/Family Planning   Questions about contraception or family planning No           Reviewed and updated as needed this visit by Provider                             Objective    Exam  BP 94/60 (BP Location: Right arm, Patient Position: Sitting, Cuff Size: Adult Large)   Pulse 68   Temp 97.6  F (36.4  C) (Tympanic)   Resp 16   Ht 1.689 m (5' 6.5\")   Wt 77.1 kg (170 lb)   SpO2 99%   BMI 27.03 kg/m     Estimated body mass index is 27.03 kg/m  as calculated from the following:    Height as of this encounter: 1.689 m (5' 6.5\").    Weight as of this encounter: 77.1 kg (170 lb).    Physical Exam  GENERAL: alert and no distress  EYES: Eyes grossly normal to inspection, and conjunctivae and sclerae normal  HENT: ear canals and TM's normal, nose and mouth without ulcers or lesions  NECK: no adenopathy, no asymmetry, masses, or scars  RESP: lungs clear to auscultation - no rales, rhonchi or wheezes  CV: regular rate and rhythm, normal S1 S2, no S3 or S4, no murmur, click or rub, no peripheral edema  ABDOMEN: soft, nontender, no hepatosplenomegaly, no masses  MS: no gross musculoskeletal defects noted, no edema  SKIN: no suspicious lesions or rashes  NEURO: Normal strength and tone, mentation intact and speech normal  PSYCH: mentation appears normal, affect normal/bright        Signed Electronically by: Deirdre E. Milligan, MD    "

## 2024-09-12 NOTE — PATIENT INSTRUCTIONS
Patient Education   Preventive Care Advice   This is general advice given by our system to help you stay healthy. However, your care team may have specific advice just for you. Please talk to your care team about your preventive care needs.  Nutrition  Eat 5 or more servings of fruits and vegetables each day.  Try wheat bread, brown rice and whole grain pasta (instead of white bread, rice, and pasta).  Get enough calcium and vitamin D. Check the label on foods and aim for 100% of the RDA (recommended daily allowance).  Lifestyle  Exercise at least 150 minutes each week  (30 minutes a day, 5 days a week).  Do muscle strengthening activities 2 days a week. These help control your weight and prevent disease.  No smoking.  Wear sunscreen to prevent skin cancer.  Have a dental exam and cleaning every 6 months.  Yearly exams  See your health care team every year to talk about:  Any changes in your health.  Any medicines your care team has prescribed.  Preventive care, family planning, and ways to prevent chronic diseases.  Shots (vaccines)   HPV shots (up to age 26), if you've never had them before.  Hepatitis B shots (up to age 59), if you've never had them before.  COVID-19 shot: Get this shot when it's due.  Flu shot: Get a flu shot every year.  Tetanus shot: Get a tetanus shot every 10 years.  Pneumococcal, hepatitis A, and RSV shots: Ask your care team if you need these based on your risk.  Shingles shot (for age 50 and up)  General health tests  Diabetes screening:  Starting at age 35, Get screened for diabetes at least every 3 years.  If you are younger than age 35, ask your care team if you should be screened for diabetes.  Cholesterol test: At age 39, start having a cholesterol test every 5 years, or more often if advised.  Bone density scan (DEXA): At age 50, ask your care team if you should have this scan for osteoporosis (brittle bones).  Hepatitis C: Get tested at least once in your life.  STIs (sexually  transmitted infections)  Before age 24: Ask your care team if you should be screened for STIs.  After age 24: Get screened for STIs if you're at risk. You are at risk for STIs (including HIV) if:  You are sexually active with more than one person.  You don't use condoms every time.  You or a partner was diagnosed with a sexually transmitted infection.  If you are at risk for HIV, ask about PrEP medicine to prevent HIV.  Get tested for HIV at least once in your life, whether you are at risk for HIV or not.  Cancer screening tests  Cervical cancer screening: If you have a cervix, begin getting regular cervical cancer screening tests starting at age 21.  Breast cancer scan (mammogram): If you've ever had breasts, begin having regular mammograms starting at age 40. This is a scan to check for breast cancer.  Colon cancer screening: It is important to start screening for colon cancer at age 45.  Have a colonoscopy test every 10 years (or more often if you're at risk) Or, ask your provider about stool tests like a FIT test every year or Cologuard test every 3 years.  To learn more about your testing options, visit:   .  For help making a decision, visit:   https://bit.ly/eb44825.  Prostate cancer screening test: If you have a prostate, ask your care team if a prostate cancer screening test (PSA) at age 55 is right for you.  Lung cancer screening: If you are a current or former smoker ages 50 to 80, ask your care team if ongoing lung cancer screenings are right for you.  For informational purposes only. Not to replace the advice of your health care provider. Copyright   2023 [employer]. All rights reserved. Clinically reviewed by the [facility]itions [facility]. [employer] 959281 - REV 01/24.

## 2024-09-13 LAB — IGA SERPL-MCNC: 137 MG/DL (ref 84–499)

## 2024-09-14 LAB
TTG IGA SER-ACNC: 0.4 U/ML
TTG IGG SER-ACNC: <0.6 U/ML

## 2024-09-17 ENCOUNTER — TELEPHONE (OUTPATIENT)
Dept: GASTROENTEROLOGY | Facility: CLINIC | Age: 44
End: 2024-09-17
Payer: COMMERCIAL

## 2024-09-17 NOTE — TELEPHONE ENCOUNTER
"Endoscopy Scheduling Screen    Have you had any respiratory illness or flu-like symptoms in the last 10 days?  No    What is your communication preference for Instructions and/or Bowel Prep?   MyChart    What insurance is in the chart?  Other:  [employer]    Ordering/Referring Provider: Milligan, Deirdre E, MD   (If ordering provider performs procedure, schedule with ordering provider unless otherwise instructed. )    BMI: Estimated body mass index is 27.03 kg/m  as calculated from the following:    Height as of 9/12/24: 1.689 m (5' 6.5\").    Weight as of 9/12/24: 77.1 kg (170 lb).     Sedation Ordered  moderate sedation.   If patient BMI > 50 do not schedule in [facility].    If patient BMI > 45 do not schedule at [facility].    Are you taking methadone or Suboxone?  NO, No RN review required.    Have you been diagnosed and are being treated for severe PTSD or severe anxiety?  NO, No RN review required.    Are you taking any prescription medications for pain 3 or more times per week?   NO, No RN review required.    Do you have a history of malignant hyperthermia?  No    (Females) Are you currently pregnant?   No     Have you been diagnosed or told you have pulmonary hypertension?   No    Do you have an LVAD?  No    Have you been told you have moderate to severe sleep apnea?  No.    Have you been told you have COPD, asthma, or any other lung disease?  No    Do you have any heart conditions?  No     Have you ever had or are you waiting for an organ transplant?  No. Continue scheduling, no site restrictions.    Have you had a stroke or transient ischemic attack (TIA aka \"mini stroke\" in the last 6 months?   No    Have you been diagnosed with or been told you have cirrhosis of the liver?   No.    Are you currently on dialysis?   No    Do you need assistance transferring?   No    BMI: Estimated body mass index is 27.03 kg/m  as calculated from the following:    Height as of 9/12/24: 1.689 m (5' 6.5\").    Weight as of 9/12/24: 77.1 kg " (170 lb).     Is patients BMI > 40 and scheduling location [facility]?  No    Do you take an injectable or oral medication for weight loss or diabetes (excluding insulin)?  No    Do you take the medication Naltrexone?  No    Do you take blood thinners?  No       Prep   Are you currently on dialysis or do you have chronic kidney disease?  No    Do you have a diagnosis of diabetes?  No    Do you have a diagnosis of cystic fibrosis (CF)?  No    On a regular basis do you go 3 -5 days between bowel movements?  No    BMI > 40?  No    Preferred Pharmacy:        [id number] - [place], [place] - [address]  [address] [place] [address]  Phone: [phone] Fax: [phone]      Final Scheduling Details     Procedure scheduled  Upper endoscopy (EGD)    Surgeon:  Maggie     Date of procedure:  09/24/2024      Pre-OP / PAC:   No - Not required for this site.    Location  [facility] - Patient preference.    Sedation   Moderate Sedation - Per order.      Patient Reminders:   You will receive a call from a Nurse to review instructions and health history.  This assessment must be completed prior to your procedure.  Failure to complete the Nurse assessment may result in the procedure being cancelled.      On the day of your procedure, please designate an adult(s) who can drive you home stay with you for the next 24 hours. The medicines used in the exam will make you sleepy. You will not be able to drive.      You cannot take public transportation, ride share services, or non-medical taxi service without a responsible caregiver.  Medical transport services are allowed with the requirement that a responsible caregiver will receive you at your destination.  We require that drivers and caregivers are confirmed prior to your procedure.

## 2024-09-17 NOTE — TELEPHONE ENCOUNTER
Pre visit planning completed.      Procedure details:    Patient scheduled for Upper endoscopy (EGD) on 09.24.2024.     Arrival time: 0645. Procedure time 0715    Facility location: [facility]; [address] [place] [address]. Check in location: Main entrance, door #1 on the North side of the building under roundabout awning. DO NOT GO TO SURGERY/ED ENTRANCE.     Sedation type: Conscious sedation     Pre op exam needed? No.    Indication for procedure: Gastroesophageal reflux disease, unspecified whether esophagitis present       Chart review:     Electronic implanted devices? No    Recent diagnosis of diverticulitis within the last 6 weeks? No      Medication review:    Diabetic? No    Anticoagulants? No    Weight loss medication/injectable? No GLP-1 medication per patient's medication list.  RN will verify with pre-assessment call.    Other medication HOLDING recommendations:  EGD: Sucralfate (Carafate): HOLD 1 day before procedure.      Prep for procedure:       Prep instructions sent via kimberli Cho RN  Endoscopy Procedure Pre Assessment RN  [phone] option 2

## 2024-09-17 NOTE — TELEPHONE ENCOUNTER
Attempted to contact patient in order to complete pre assessment questions.     No answer. Left message to return call to [phone] option 2    Callback required communication sent via [employer].      Bina Cho RN  Endoscopy Procedure Pre Assessment

## 2024-09-17 NOTE — TELEPHONE ENCOUNTER
Pre assessment completed for upcoming procedure.   (Please see previous telephone encounter notes for complete details)    Procedure details:    Arrival time and facility location reviewed.    Pre op exam needed? No.    Designated  policy reviewed. Instructed to have someone stay 6 hours post procedure.     COVID policy reviewed.      Medication review:    Medications reviewed. Please see supporting documentation below. Holding recommendations discussed (if applicable).       Prep for procedure:     Procedure prep instructions reviewed.        Additional information needed?  N/A      Patient  verbalized understanding and had no questions or concerns at this time.      Corinne Kliber, RN  Endoscopy Procedure Pre Assessment   [phone] option 4

## 2024-09-24 ENCOUNTER — HOSPITAL ENCOUNTER (OUTPATIENT)
Facility: CLINIC | Age: 44
Discharge: HOME | End: 2024-09-24
Attending: INTERNAL MEDICINE | Admitting: INTERNAL MEDICINE
Payer: COMMERCIAL

## 2024-09-24 VITALS
WEIGHT: 165 LBS | OXYGEN SATURATION: 98 % | HEART RATE: 63 BPM | SYSTOLIC BLOOD PRESSURE: 105 MMHG | DIASTOLIC BLOOD PRESSURE: 81 MMHG | RESPIRATION RATE: 16 BRPM | HEIGHT: 67 IN | BODY MASS INDEX: 25.9 KG/M2

## 2024-09-24 LAB — UPPER GI ENDOSCOPY: NORMAL

## 2024-09-24 PROCEDURE — 250N000009 HC RX 250: Performed by: INTERNAL MEDICINE

## 2024-09-24 PROCEDURE — 88305 TISSUE EXAM BY PATHOLOGIST: CPT | Mod: TC | Performed by: INTERNAL MEDICINE

## 2024-09-24 PROCEDURE — 250N000011 HC RX IP 250 OP 636: Performed by: INTERNAL MEDICINE

## 2024-09-24 PROCEDURE — 999N000099 HC STATISTIC MODERATE SEDATION < 10 MIN: Performed by: INTERNAL MEDICINE

## 2024-09-24 PROCEDURE — 43239 EGD BIOPSY SINGLE/MULTIPLE: CPT | Performed by: INTERNAL MEDICINE

## 2024-09-24 PROCEDURE — 88305 TISSUE EXAM BY PATHOLOGIST: CPT | Mod: 26 | Performed by: PATHOLOGY

## 2024-09-24 RX ORDER — NALOXONE HYDROCHLORIDE 0.4 MG/ML
0.4 INJECTION, SOLUTION INTRAMUSCULAR; INTRAVENOUS; SUBCUTANEOUS
Status: DISCONTINUED | OUTPATIENT
Start: 2024-09-24 | End: 2024-09-24 | Stop reason: HOSPADM

## 2024-09-24 RX ORDER — EPINEPHRINE 1 MG/ML
0.1 INJECTION, SOLUTION INTRAMUSCULAR; SUBCUTANEOUS
Status: DISCONTINUED | OUTPATIENT
Start: 2024-09-24 | End: 2024-09-24 | Stop reason: HOSPADM

## 2024-09-24 RX ORDER — NALOXONE HYDROCHLORIDE 0.4 MG/ML
0.2 INJECTION, SOLUTION INTRAMUSCULAR; INTRAVENOUS; SUBCUTANEOUS
Status: DISCONTINUED | OUTPATIENT
Start: 2024-09-24 | End: 2024-09-24 | Stop reason: HOSPADM

## 2024-09-24 RX ORDER — FLUMAZENIL 0.1 MG/ML
0.2 INJECTION, SOLUTION INTRAVENOUS
Status: CANCELLED | OUTPATIENT
Start: 2024-09-24 | End: 2024-09-24

## 2024-09-24 RX ORDER — PROCHLORPERAZINE MALEATE 10 MG
10 TABLET ORAL EVERY 6 HOURS PRN
Status: CANCELLED | OUTPATIENT
Start: 2024-09-24

## 2024-09-24 RX ORDER — ATROPINE SULFATE 0.1 MG/ML
1 INJECTION INTRAVENOUS
Status: DISCONTINUED | OUTPATIENT
Start: 2024-09-24 | End: 2024-09-24 | Stop reason: HOSPADM

## 2024-09-24 RX ORDER — FLUMAZENIL 0.1 MG/ML
0.2 INJECTION, SOLUTION INTRAVENOUS
Status: DISCONTINUED | OUTPATIENT
Start: 2024-09-24 | End: 2024-09-24 | Stop reason: HOSPADM

## 2024-09-24 RX ORDER — FENTANYL CITRATE 50 UG/ML
50-100 INJECTION, SOLUTION INTRAMUSCULAR; INTRAVENOUS EVERY 5 MIN PRN
Status: DISCONTINUED | OUTPATIENT
Start: 2024-09-24 | End: 2024-09-24 | Stop reason: HOSPADM

## 2024-09-24 RX ORDER — ONDANSETRON 2 MG/ML
4 INJECTION INTRAMUSCULAR; INTRAVENOUS
Status: DISCONTINUED | OUTPATIENT
Start: 2024-09-24 | End: 2024-09-24 | Stop reason: HOSPADM

## 2024-09-24 RX ORDER — DIPHENHYDRAMINE HYDROCHLORIDE 50 MG/ML
25-50 INJECTION INTRAMUSCULAR; INTRAVENOUS
Status: DISCONTINUED | OUTPATIENT
Start: 2024-09-24 | End: 2024-09-24 | Stop reason: HOSPADM

## 2024-09-24 RX ORDER — LIDOCAINE 40 MG/G
CREAM TOPICAL
Status: DISCONTINUED | OUTPATIENT
Start: 2024-09-24 | End: 2024-09-24 | Stop reason: HOSPADM

## 2024-09-24 RX ORDER — SIMETHICONE 40MG/0.6ML
133 SUSPENSION, DROPS(FINAL DOSAGE FORM)(ML) ORAL
Status: DISCONTINUED | OUTPATIENT
Start: 2024-09-24 | End: 2024-09-24 | Stop reason: HOSPADM

## 2024-09-24 RX ORDER — ONDANSETRON 4 MG/1
4 TABLET, ORALLY DISINTEGRATING ORAL EVERY 6 HOURS PRN
Status: CANCELLED | OUTPATIENT
Start: 2024-09-24

## 2024-09-24 RX ORDER — ONDANSETRON 2 MG/ML
4 INJECTION INTRAMUSCULAR; INTRAVENOUS EVERY 6 HOURS PRN
Status: CANCELLED | OUTPATIENT
Start: 2024-09-24

## 2024-09-24 RX ADMIN — TOPICAL ANESTHETIC 0.5 ML: 200 SPRAY DENTAL; PERIODONTAL at 07:32

## 2024-09-24 RX ADMIN — FENTANYL CITRATE 100 MCG: 50 INJECTION, SOLUTION INTRAMUSCULAR; INTRAVENOUS at 07:32

## 2024-09-24 RX ADMIN — MIDAZOLAM 2 MG: 1 INJECTION INTRAMUSCULAR; INTRAVENOUS at 07:32

## 2024-09-24 ASSESSMENT — ACTIVITIES OF DAILY LIVING (ADL)
ADLS_ACUITY_SCORE: 35
ADLS_ACUITY_SCORE: 35

## 2024-09-24 NOTE — H&P
Pre-Endoscopy History and Physical     Lindsay Payne MRN# [id number]   YOB: 1980 Age: 43 year old     Date of Procedure: 9/24/2024  Primary care provider: Milligan, Deirdre E  Type of Endoscopy: Gastroscopy with possible biopsy, possible dilation  Reason for Procedure: reflux  Type of Anesthesia Anticipated: Conscious Sedation    HPI:    Lindsay is a 43 year old female who will be undergoing the above procedure.      A history and physical has been performed. The patient's medications and allergies have been reviewed. The risks and benefits of the procedure and the sedation options and risks were discussed with the patient.  All questions were answered and informed consent was obtained.      She denies a personal or family history of anesthesia complications or bleeding disorders.     Patient Active Problem List   Diagnosis    CARDIOVASCULAR SCREENING; LDL GOAL LESS THAN 160    Gastroesophageal reflux disease, esophagitis presence not specified    IUD (intrauterine device) in place    Encounter for removal and reinsertion of intrauterine contraceptive device        Past Medical History:   Diagnosis Date    NO ACTIVE PROBLEMS     Tear of right meniscus as current injury 2020        Past Surgical History:   Procedure Laterality Date    APPENDECTOMY  1994    APPENDECTOMY  2009       Social History     Tobacco Use    Smoking status: Never    Smokeless tobacco: Never   Substance Use Topics    Alcohol use: Not Currently     Comment: 1-2 drinks per month       Family History   Problem Relation Age of Onset    Cancer Mother         malignant melanoma    Melanoma Mother     Arthritis Father         Bekhterev's disease-spinal suffication    Skin Cancer Father     Glaucoma Maternal Grandmother        Prior to Admission medications    Medication Sig Start Date End Date Taking? Authorizing Provider   azelaic acid (FINACIA) 15 % external gel Daily  Patient taking differently: Using as needed. 5/10/23    "Monica Velez MD   imiquimod (ALDARA) 5 % external cream Apply topically three times a week  Patient not taking: Reported on 5/10/2023 8/31/22   Monica Velez MD   ivermectin (SOOLANTRA) 1 % cream Apply to the affected areas of the face once daily. Use a pea-size amount for each area of the face (forehead, chin, nose, each cheek) that is affected. Spread as a thin layer, avoiding the eyes and lips. 5/10/23   Monica Velez MD   levonorgestrel (MIRENA) 20 MCG/24HR IUD 1 each by Intrauterine route once    Reported, Patient   levonorgestrel (MIRENA) 52 MG (20 mcg/day) IUD 1 each by Intrauterine route once    Bina Pino MD   omeprazole (PRILOSEC) 20 MG DR capsule Take 1 capsule (20 mg) by mouth daily. 8/23/24   Milligan, Deirdre E, MD   sucralfate (CARAFATE) 1 GM/10ML suspension Take 10 mLs (1 g) by mouth 4 times daily  Patient not taking: Reported on 5/10/2023 12/9/22   Sheri Kennedy MD   tretinoin (RETIN-A) 0.025 % external cream Please apply a pea sized amount to entire face every other night for 2 weeks. If no irritation, can increase to nightly 5/10/23   Monica Velez MD   tretinoin (RETIN-A) 0.05 % external cream Apply topically at bedtime. 9/9/24   Monica Velez MD       No Known Allergies     REVIEW OF SYSTEMS:   5 point ROS negative except as noted above in HPI, including Gen., Resp., CV, GI &  system review.    PHYSICAL EXAM:   There were no vitals taken for this visit. Estimated body mass index is 27.03 kg/m  as calculated from the following:    Height as of 9/12/24: 1.689 m (5' 6.5\").    Weight as of 9/12/24: 77.1 kg (170 lb).   GENERAL APPEARANCE: alert, and oriented  MENTAL STATUS: alert  AIRWAY EXAM: Mallampatti Class I (visualization of the soft palate, fauces, uvula, anterior and posterior pillars)  RESP: lungs clear to auscultation - no rales, rhonchi or wheezes  CV: regular rates and rhythm  DIAGNOSTICS:    Not " indicated    IMPRESSION   ASA Class 2 - Mild systemic disease    PLAN:   Plan for Gastroscopy with possible biopsy, possible dilation. We discussed the risks, benefits and alternatives and the patient wished to proceed.    The above has been forwarded to the consulting provider.      Signed Electronically by: Jeffery Serrano MD  September 24, 2024

## 2024-09-25 LAB
PATH REPORT.COMMENTS IMP SPEC: NORMAL
PATH REPORT.COMMENTS IMP SPEC: NORMAL
PATH REPORT.FINAL DX SPEC: NORMAL
PATH REPORT.GROSS SPEC: NORMAL
PATH REPORT.MICROSCOPIC SPEC OTHER STN: NORMAL
PATH REPORT.RELEVANT HX SPEC: NORMAL
PHOTO IMAGE: NORMAL

## 2024-11-11 NOTE — NURSING NOTE
Chief Complaints and History of Present Illnesses   Patient presents with     Consult For     Red, itchy right eye     HPI    Affected eye(s):  Both   Symptoms:     No blurred vision   Redness   No tearing   Itching   Burning      Duration:  1 month   Frequency:  Constant       Do you have eye pain now?:  Yes   Location:  OD   Pain Level:  Mild Pain (2)   Pain Frequency:  Constant   Pain Characteristics:  Burning      Comments:  Redness right eye worse then left eye, starting about a month ago. Eyes are itching, red and painful and has slowly increased in symptoms since onset. Denies tearing or vision changes. Using OTC [place]away 3-4 times daily OU, which helps a little.    Paula Oneill COT 12:52 PM July 5, 2017                 normal...

## 2025-09-01 ENCOUNTER — PATIENT OUTREACH (OUTPATIENT)
Dept: CARE COORDINATION | Facility: CLINIC | Age: 45
End: 2025-09-01
Payer: COMMERCIAL

## (undated) DEVICE — ENDO FORCEP ENDOJAW BIOPSY 2.8MMX160CM FB-220K

## (undated) DEVICE — ENDO BITE BLOCK ADULT OLYMPUS LATEX FREE MAJ-1632

## (undated) DEVICE — KIT ENDO TURNOVER/PROCEDURE W/CLEAN A SCOPE LINERS 103888

## (undated) RX ORDER — FENTANYL CITRATE 50 UG/ML
INJECTION, SOLUTION INTRAMUSCULAR; INTRAVENOUS
Status: DISPENSED
Start: 2024-09-24